# Patient Record
Sex: MALE | Race: WHITE | NOT HISPANIC OR LATINO | Employment: OTHER | URBAN - NONMETROPOLITAN AREA
[De-identification: names, ages, dates, MRNs, and addresses within clinical notes are randomized per-mention and may not be internally consistent; named-entity substitution may affect disease eponyms.]

---

## 2023-03-22 LAB
ALANINE AMINOTRANSFERASE (SGPT) (U/L) IN SER/PLAS: NORMAL
ALBUMIN (G/DL) IN SER/PLAS: NORMAL
ALKALINE PHOSPHATASE (U/L) IN SER/PLAS: NORMAL
ANION GAP IN SER/PLAS: NORMAL
ASPARTATE AMINOTRANSFERASE (SGOT) (U/L) IN SER/PLAS: NORMAL
BASOPHILS (10*3/UL) IN BLOOD BY AUTOMATED COUNT: NORMAL
BASOPHILS/100 LEUKOCYTES IN BLOOD BY AUTOMATED COUNT: NORMAL
BILIRUBIN TOTAL (MG/DL) IN SER/PLAS: NORMAL
CALCIUM (MG/DL) IN SER/PLAS: NORMAL
CARBON DIOXIDE, TOTAL (MMOL/L) IN SER/PLAS: NORMAL
CHLORIDE (MMOL/L) IN SER/PLAS: NORMAL
CHOLESTEROL (MG/DL) IN SER/PLAS: NORMAL
CHOLESTEROL IN HDL (MG/DL) IN SER/PLAS: NORMAL
CHOLESTEROL/HDL RATIO: NORMAL
CREATININE (MG/DL) IN SER/PLAS: NORMAL
EOSINOPHILS (10*3/UL) IN BLOOD BY AUTOMATED COUNT: NORMAL
EOSINOPHILS/100 LEUKOCYTES IN BLOOD BY AUTOMATED COUNT: NORMAL
ERYTHROCYTE DISTRIBUTION WIDTH (RATIO) BY AUTOMATED COUNT: NORMAL
ERYTHROCYTE MEAN CORPUSCULAR HEMOGLOBIN CONCENTRATION (G/DL) BY AUTOMATED: NORMAL
ERYTHROCYTE MEAN CORPUSCULAR VOLUME (FL) BY AUTOMATED COUNT: NORMAL
ERYTHROCYTES (10*6/UL) IN BLOOD BY AUTOMATED COUNT: NORMAL
GFR FEMALE: NORMAL
GFR MALE: NORMAL
GLUCOSE (MG/DL) IN SER/PLAS: NORMAL
HEMATOCRIT (%) IN BLOOD BY AUTOMATED COUNT: NORMAL
HEMOGLOBIN (G/DL) IN BLOOD: NORMAL
IMMATURE GRANULOCYTES/100 LEUKOCYTES IN BLOOD BY AUTOMATED COUNT: NORMAL
LDL: NORMAL
LEUKOCYTES (10*3/UL) IN BLOOD BY AUTOMATED COUNT: NORMAL
LYMPHOCYTES (10*3/UL) IN BLOOD BY AUTOMATED COUNT: NORMAL
LYMPHOCYTES/100 LEUKOCYTES IN BLOOD BY AUTOMATED COUNT: NORMAL
MANUAL DIFFERENTIAL Y/N: NORMAL
MONOCYTES (10*3/UL) IN BLOOD BY AUTOMATED COUNT: NORMAL
MONOCYTES/100 LEUKOCYTES IN BLOOD BY AUTOMATED COUNT: NORMAL
NEUTROPHILS (10*3/UL) IN BLOOD BY AUTOMATED COUNT: NORMAL
NEUTROPHILS/100 LEUKOCYTES IN BLOOD BY AUTOMATED COUNT: NORMAL
NON HDL CHOLESTEROL: NORMAL
NRBC (PER 100 WBCS) BY AUTOMATED COUNT: NORMAL
PLATELETS (10*3/UL) IN BLOOD AUTOMATED COUNT: NORMAL
POTASSIUM (MMOL/L) IN SER/PLAS: NORMAL
PROTEIN TOTAL: NORMAL
SODIUM (MMOL/L) IN SER/PLAS: NORMAL
TRIGLYCERIDE (MG/DL) IN SER/PLAS: NORMAL
UREA NITROGEN (MG/DL) IN SER/PLAS: NORMAL
VLDL: NORMAL

## 2023-03-23 LAB — PROSTATE SPECIFIC AG (NG/ML) IN SER/PLAS: <0.1 NG/ML (ref 0–4)

## 2023-06-27 PROBLEM — F17.210 CIGARETTE SMOKER: Status: ACTIVE | Noted: 2023-06-27

## 2023-06-27 PROBLEM — T14.8XXA BRUISING: Status: RESOLVED | Noted: 2023-06-27 | Resolved: 2023-06-27

## 2023-06-27 PROBLEM — R32 URINARY INCONTINENCE: Status: ACTIVE | Noted: 2023-06-27

## 2023-06-27 PROBLEM — J06.9 URI (UPPER RESPIRATORY INFECTION): Status: RESOLVED | Noted: 2023-06-27 | Resolved: 2023-06-27

## 2023-06-27 PROBLEM — L20.9 ATOPIC DERMATITIS: Status: ACTIVE | Noted: 2023-06-27

## 2023-06-27 PROBLEM — N39.0 URINARY TRACT INFECTION: Status: ACTIVE | Noted: 2023-06-27

## 2023-06-27 PROBLEM — C61 PROSTATE CANCER (MULTI): Status: ACTIVE | Noted: 2023-06-27

## 2023-06-27 RX ORDER — TADALAFIL 5 MG/1
1 TABLET ORAL DAILY
COMMUNITY
Start: 2022-01-31

## 2023-06-27 RX ORDER — BUPROPION HYDROCHLORIDE 300 MG/1
1 TABLET ORAL DAILY
COMMUNITY
Start: 2022-01-31 | End: 2023-12-14 | Stop reason: ALTCHOICE

## 2023-06-27 RX ORDER — OMEPRAZOLE 20 MG/1
TABLET, ORALLY DISINTEGRATING, DELAYED RELEASE ORAL
COMMUNITY
Start: 2022-01-31 | End: 2023-12-14 | Stop reason: SDUPTHER

## 2023-06-27 RX ORDER — BUPROPION HYDROCHLORIDE 150 MG/1
1 TABLET ORAL DAILY
COMMUNITY
Start: 2022-11-27 | End: 2023-06-30 | Stop reason: SDUPTHER

## 2023-06-27 NOTE — PROGRESS NOTES
Subjective   Reason for Visit: Alfa Cast is an 63 y.o. male here for a Medicare Wellness visit.     HPI  Prostate cancer: He is going to [facility] for pelvic floor PT. s/p robotic prostatectomy with Dr. Nel Hawthorne in March 2021. He has a followup with the surgeon's team at [facility] next month. Recently started on the cialis. He went to pelvic floor PT x2 for 4 or 5 months apiece and he only had slight temporary improvement. He states that his issue is that there is no sensation beforehand to warn him that he needs to urinate.     Getting more active. He was on wellbutrin and it was doubled up. He gave up smoking for lent!!!      GERD: Controlled on omeprazole.      Tinnitus: He has a history of tinnitus, not using hearing aids.      Dermatitis: He has been having skin dryness and splitting. He uses a working hands otx ointment. He is also having an unusual problem with bruising since he got his flut shot this year. He is experiencing some dehydration because of his bladder symptoms.     Cigarette smoker: Quit for Lent!!      He has a special needs son, age 22, who came out here from [place] to get access to a school in the area.     All other systems have been reviewed and are negative for complaint     Patient Care Team:  Veda Vital DO as PCP - General  [facility]     Review of Systems   Constitutional:  Negative for chills and fever.   HENT:  Negative for congestion, ear pain and rhinorrhea.    Eyes:  Negative for discharge and redness.   Respiratory:  Negative for cough, shortness of breath and wheezing.    Cardiovascular:  Negative for chest pain and leg swelling.   Gastrointestinal:  Negative for abdominal pain, constipation, diarrhea, nausea and vomiting.   Genitourinary:  Negative for difficulty urinating, frequency and urgency.   Musculoskeletal:  Negative for gait problem.   Skin:  Negative for rash and wound.   Neurological:  Negative for dizziness,  "weakness and headaches.   Psychiatric/Behavioral:  Negative for confusion. The patient is not nervous/anxious.        Objective   Vitals:  /76 (BP Location: Right arm, Patient Position: Sitting, BP Cuff Size: Large adult)   Pulse 60   Ht 1.753 m (5' 9\")   Wt 97 kg (213 lb 12.8 oz)   SpO2 97%   BMI 31.57 kg/m²       Physical Exam  Vitals reviewed.   Constitutional:       Appearance: Normal appearance.   HENT:      Head: Normocephalic.      Right Ear: Tympanic membrane, ear canal and external ear normal.      Left Ear: Tympanic membrane, ear canal and external ear normal.      Nose: No rhinorrhea.      Mouth/Throat:      Mouth: Mucous membranes are moist.      Pharynx: Oropharynx is clear.   Eyes:      Pupils: Pupils are equal, round, and reactive to light.   Cardiovascular:      Rate and Rhythm: Normal rate and regular rhythm.      Pulses: Normal pulses.   Pulmonary:      Effort: Pulmonary effort is normal.      Breath sounds: Normal breath sounds.   Abdominal:      General: Abdomen is flat. Bowel sounds are normal.      Palpations: Abdomen is soft.   Musculoskeletal:         General: No tenderness. Normal range of motion.      Right lower leg: No edema.      Left lower leg: No edema.   Lymphadenopathy:      Cervical: No cervical adenopathy.   Skin:     General: Skin is warm and dry.      Findings: No rash.   Neurological:      Mental Status: He is alert and oriented to person, place, and time.   Psychiatric:         Mood and Affect: Mood normal.         Behavior: Behavior normal.       Assessment/Plan   Problem List Items Addressed This Visit    None       #Prostate cancer  #Urinary incontinence  #ED  s/p radical prostatectomy  he has been to pelvic floor PT without much improvement  Monitoring PSA with [facility]      #Tinnitus  discussed supportive care     #Easy bruising  check labs  He wants to hold off on derm eval for now     #Cigarette smoker  wellbutrin is helpful  Cigar once per day   Quit for a long " time  Ppd >20 years   Would like CT lung cancer screening      #GERD  Controlled on otc omeprazole     HCM:  UTD for COVID, PPSV23, and flu shots  last C-scope was 2021, next due 2026

## 2023-06-30 ENCOUNTER — OFFICE VISIT (OUTPATIENT)
Dept: PRIMARY CARE | Facility: CLINIC | Age: 63
End: 2023-06-30
Payer: MEDICARE

## 2023-06-30 VITALS
OXYGEN SATURATION: 97 % | SYSTOLIC BLOOD PRESSURE: 144 MMHG | HEART RATE: 60 BPM | DIASTOLIC BLOOD PRESSURE: 76 MMHG | BODY MASS INDEX: 31.67 KG/M2 | HEIGHT: 69 IN | WEIGHT: 213.8 LBS

## 2023-06-30 DIAGNOSIS — C61 PROSTATE CANCER (MULTI): ICD-10-CM

## 2023-06-30 DIAGNOSIS — Z00.00 ROUTINE GENERAL MEDICAL EXAMINATION AT HEALTH CARE FACILITY: Primary | ICD-10-CM

## 2023-06-30 DIAGNOSIS — N39.3 STRESS INCONTINENCE OF URINE: ICD-10-CM

## 2023-06-30 DIAGNOSIS — Z13.6 SCREENING FOR AAA (ABDOMINAL AORTIC ANEURYSM): ICD-10-CM

## 2023-06-30 DIAGNOSIS — Z13.1 DIABETES MELLITUS SCREENING: ICD-10-CM

## 2023-06-30 DIAGNOSIS — Z13.6 SCREENING FOR CARDIOVASCULAR CONDITION: ICD-10-CM

## 2023-06-30 DIAGNOSIS — Z87.891 PERSONAL HISTORY OF TOBACCO USE, PRESENTING HAZARDS TO HEALTH: ICD-10-CM

## 2023-06-30 PROCEDURE — G0402 INITIAL PREVENTIVE EXAM: HCPCS | Performed by: REGISTERED NURSE

## 2023-06-30 PROCEDURE — 1159F MED LIST DOCD IN RCRD: CPT | Performed by: REGISTERED NURSE

## 2023-06-30 PROCEDURE — 1036F TOBACCO NON-USER: CPT | Performed by: REGISTERED NURSE

## 2023-06-30 PROCEDURE — 1160F RVW MEDS BY RX/DR IN RCRD: CPT | Performed by: REGISTERED NURSE

## 2023-06-30 PROCEDURE — 99214 OFFICE O/P EST MOD 30 MIN: CPT | Performed by: REGISTERED NURSE

## 2023-06-30 PROCEDURE — 1170F FXNL STATUS ASSESSED: CPT | Performed by: REGISTERED NURSE

## 2023-06-30 ASSESSMENT — ENCOUNTER SYMPTOMS
SHORTNESS OF BREATH: 0
EYE REDNESS: 0
NERVOUS/ANXIOUS: 0
CONSTIPATION: 0
HEADACHES: 0
WHEEZING: 0
CHILLS: 0
DIARRHEA: 0
COUGH: 0
RHINORRHEA: 0
LOSS OF SENSATION IN FEET: 0
VOMITING: 0
EYE DISCHARGE: 0
DIFFICULTY URINATING: 0
DEPRESSION: 0
FREQUENCY: 0
ABDOMINAL PAIN: 0
OCCASIONAL FEELINGS OF UNSTEADINESS: 0
CONFUSION: 0
WEAKNESS: 0
WOUND: 0
DIZZINESS: 0
NAUSEA: 0
FEVER: 0

## 2023-06-30 ASSESSMENT — ACTIVITIES OF DAILY LIVING (ADL)
MANAGING_FINANCES: INDEPENDENT
DOING_HOUSEWORK: INDEPENDENT
GROCERY_SHOPPING: INDEPENDENT
TAKING_MEDICATION: INDEPENDENT
BATHING: INDEPENDENT
DRESSING: INDEPENDENT

## 2023-06-30 ASSESSMENT — PATIENT HEALTH QUESTIONNAIRE - PHQ9
SUM OF ALL RESPONSES TO PHQ9 QUESTIONS 1 AND 2: 1
10. IF YOU CHECKED OFF ANY PROBLEMS, HOW DIFFICULT HAVE THESE PROBLEMS MADE IT FOR YOU TO DO YOUR WORK, TAKE CARE OF THINGS AT HOME, OR GET ALONG WITH OTHER PEOPLE: NOT DIFFICULT AT ALL
2. FEELING DOWN, DEPRESSED OR HOPELESS: NOT AT ALL
1. LITTLE INTEREST OR PLEASURE IN DOING THINGS: SEVERAL DAYS

## 2023-08-01 ENCOUNTER — LAB (OUTPATIENT)
Dept: LAB | Facility: LAB | Age: 63
End: 2023-08-01
Payer: MEDICARE

## 2023-08-01 DIAGNOSIS — Z13.1 DIABETES MELLITUS SCREENING: ICD-10-CM

## 2023-08-01 DIAGNOSIS — Z13.6 SCREENING FOR CARDIOVASCULAR CONDITION: ICD-10-CM

## 2023-08-01 LAB
ANION GAP IN SER/PLAS: 9 MMOL/L (ref 10–20)
CALCIUM (MG/DL) IN SER/PLAS: 9 MG/DL (ref 8.6–10.3)
CARBON DIOXIDE, TOTAL (MMOL/L) IN SER/PLAS: 27 MMOL/L (ref 21–32)
CHLORIDE (MMOL/L) IN SER/PLAS: 104 MMOL/L (ref 98–107)
CHOLESTEROL (MG/DL) IN SER/PLAS: 215 MG/DL (ref 0–199)
CHOLESTEROL IN HDL (MG/DL) IN SER/PLAS: 61.6 MG/DL
CHOLESTEROL/HDL RATIO: 3.5
CREATININE (MG/DL) IN SER/PLAS: 0.68 MG/DL (ref 0.5–1.3)
GFR MALE: >90 ML/MIN/1.73M2
GLUCOSE (MG/DL) IN SER/PLAS: 100 MG/DL (ref 74–99)
LDL: 130 MG/DL (ref 0–99)
POTASSIUM (MMOL/L) IN SER/PLAS: 4.2 MMOL/L (ref 3.5–5.3)
SODIUM (MMOL/L) IN SER/PLAS: 136 MMOL/L (ref 136–145)
TRIGLYCERIDE (MG/DL) IN SER/PLAS: 118 MG/DL (ref 0–149)
UREA NITROGEN (MG/DL) IN SER/PLAS: 17 MG/DL (ref 6–23)
VLDL: 24 MG/DL (ref 0–40)

## 2023-08-01 PROCEDURE — 80061 LIPID PANEL: CPT

## 2023-08-01 PROCEDURE — 36415 COLL VENOUS BLD VENIPUNCTURE: CPT

## 2023-08-01 PROCEDURE — 80048 BASIC METABOLIC PNL TOTAL CA: CPT

## 2023-09-23 LAB — PROSTATE SPECIFIC AG (NG/ML) IN SER/PLAS: <0.1 NG/ML (ref 0–4)

## 2023-11-24 ENCOUNTER — APPOINTMENT (OUTPATIENT)
Dept: RADIOLOGY | Facility: HOSPITAL | Age: 63
End: 2023-11-24
Payer: MEDICARE

## 2023-11-24 ENCOUNTER — HOSPITAL ENCOUNTER (EMERGENCY)
Facility: HOSPITAL | Age: 63
Discharge: HOME | End: 2023-11-24
Attending: EMERGENCY MEDICINE
Payer: MEDICARE

## 2023-11-24 VITALS
TEMPERATURE: 98.6 F | DIASTOLIC BLOOD PRESSURE: 108 MMHG | OXYGEN SATURATION: 97 % | BODY MASS INDEX: 30.06 KG/M2 | WEIGHT: 210 LBS | SYSTOLIC BLOOD PRESSURE: 154 MMHG | HEART RATE: 78 BPM | RESPIRATION RATE: 20 BRPM | HEIGHT: 70 IN

## 2023-11-24 DIAGNOSIS — S32.030A CLOSED COMPRESSION FRACTURE OF L3 LUMBAR VERTEBRA, INITIAL ENCOUNTER (MULTI): Primary | ICD-10-CM

## 2023-11-24 DIAGNOSIS — M16.12 PRIMARY OSTEOARTHRITIS OF LEFT HIP: ICD-10-CM

## 2023-11-24 LAB
ANION GAP SERPL CALC-SCNC: 10 MMOL/L (ref 10–20)
APPEARANCE UR: CLEAR
BASOPHILS # BLD AUTO: 0.12 X10*3/UL (ref 0–0.1)
BASOPHILS NFR BLD AUTO: 2.7 %
BILIRUB UR STRIP.AUTO-MCNC: NEGATIVE MG/DL
BUN SERPL-MCNC: 14 MG/DL (ref 6–23)
CALCIUM SERPL-MCNC: 8.7 MG/DL (ref 8.6–10.3)
CHLORIDE SERPL-SCNC: 108 MMOL/L (ref 98–107)
CO2 SERPL-SCNC: 28 MMOL/L (ref 21–32)
COLOR UR: YELLOW
CREAT SERPL-MCNC: 0.63 MG/DL (ref 0.5–1.3)
EOSINOPHIL # BLD AUTO: 0.29 X10*3/UL (ref 0–0.7)
EOSINOPHIL NFR BLD AUTO: 6.6 %
ERYTHROCYTE [DISTWIDTH] IN BLOOD BY AUTOMATED COUNT: 13.7 % (ref 11.5–14.5)
GFR SERPL CREATININE-BSD FRML MDRD: >90 ML/MIN/1.73M*2
GLUCOSE SERPL-MCNC: 88 MG/DL (ref 74–99)
GLUCOSE UR STRIP.AUTO-MCNC: NEGATIVE MG/DL
HCT VFR BLD AUTO: 39.4 % (ref 41–52)
HGB BLD-MCNC: 13.3 G/DL (ref 13.5–17.5)
IMM GRANULOCYTES # BLD AUTO: 0.01 X10*3/UL (ref 0–0.7)
IMM GRANULOCYTES NFR BLD AUTO: 0.2 % (ref 0–0.9)
KETONES UR STRIP.AUTO-MCNC: NEGATIVE MG/DL
LEUKOCYTE ESTERASE UR QL STRIP.AUTO: NEGATIVE
LYMPHOCYTES # BLD AUTO: 1.19 X10*3/UL (ref 1.2–4.8)
LYMPHOCYTES NFR BLD AUTO: 27 %
MCH RBC QN AUTO: 30.9 PG (ref 26–34)
MCHC RBC AUTO-ENTMCNC: 33.8 G/DL (ref 32–36)
MCV RBC AUTO: 91 FL (ref 80–100)
MONOCYTES # BLD AUTO: 0.67 X10*3/UL (ref 0.1–1)
MONOCYTES NFR BLD AUTO: 15.2 %
NEUTROPHILS # BLD AUTO: 2.12 X10*3/UL (ref 1.2–7.7)
NEUTROPHILS NFR BLD AUTO: 48.3 %
NITRITE UR QL STRIP.AUTO: NEGATIVE
NRBC BLD-RTO: 0 /100 WBCS (ref 0–0)
PH UR STRIP.AUTO: 7 [PH]
PLATELET # BLD AUTO: 263 X10*3/UL (ref 150–450)
POTASSIUM SERPL-SCNC: 4.2 MMOL/L (ref 3.5–5.3)
PROT UR STRIP.AUTO-MCNC: NEGATIVE MG/DL
RBC # BLD AUTO: 4.31 X10*6/UL (ref 4.5–5.9)
RBC # UR STRIP.AUTO: NEGATIVE /UL
SODIUM SERPL-SCNC: 142 MMOL/L (ref 136–145)
SP GR UR STRIP.AUTO: 1.02
UROBILINOGEN UR STRIP.AUTO-MCNC: <2 MG/DL
WBC # BLD AUTO: 4.4 X10*3/UL (ref 4.4–11.3)

## 2023-11-24 PROCEDURE — 81003 URINALYSIS AUTO W/O SCOPE: CPT | Performed by: EMERGENCY MEDICINE

## 2023-11-24 PROCEDURE — 96372 THER/PROPH/DIAG INJ SC/IM: CPT

## 2023-11-24 PROCEDURE — 99284 EMERGENCY DEPT VISIT MOD MDM: CPT | Mod: 25

## 2023-11-24 PROCEDURE — 99285 EMERGENCY DEPT VISIT HI MDM: CPT | Mod: 25 | Performed by: EMERGENCY MEDICINE

## 2023-11-24 PROCEDURE — 94760 N-INVAS EAR/PLS OXIMETRY 1: CPT

## 2023-11-24 PROCEDURE — 85025 COMPLETE CBC W/AUTO DIFF WBC: CPT | Performed by: EMERGENCY MEDICINE

## 2023-11-24 PROCEDURE — 72131 CT LUMBAR SPINE W/O DYE: CPT

## 2023-11-24 PROCEDURE — 36415 COLL VENOUS BLD VENIPUNCTURE: CPT | Performed by: EMERGENCY MEDICINE

## 2023-11-24 PROCEDURE — 2500000004 HC RX 250 GENERAL PHARMACY W/ HCPCS (ALT 636 FOR OP/ED): Performed by: EMERGENCY MEDICINE

## 2023-11-24 PROCEDURE — 73502 X-RAY EXAM HIP UNI 2-3 VIEWS: CPT | Mod: RT,FY

## 2023-11-24 PROCEDURE — 72131 CT LUMBAR SPINE W/O DYE: CPT | Performed by: RADIOLOGY

## 2023-11-24 PROCEDURE — 2500000001 HC RX 250 WO HCPCS SELF ADMINISTERED DRUGS (ALT 637 FOR MEDICARE OP): Performed by: EMERGENCY MEDICINE

## 2023-11-24 PROCEDURE — 80048 BASIC METABOLIC PNL TOTAL CA: CPT | Performed by: EMERGENCY MEDICINE

## 2023-11-24 PROCEDURE — 73502 X-RAY EXAM HIP UNI 2-3 VIEWS: CPT | Mod: RIGHT SIDE | Performed by: RADIOLOGY

## 2023-11-24 RX ORDER — ORPHENADRINE CITRATE 30 MG/ML
60 INJECTION INTRAMUSCULAR; INTRAVENOUS ONCE
Status: COMPLETED | OUTPATIENT
Start: 2023-11-24 | End: 2023-11-24

## 2023-11-24 RX ORDER — HYDROCODONE BITARTRATE AND ACETAMINOPHEN 5; 325 MG/1; MG/1
1 TABLET ORAL EVERY 6 HOURS PRN
Qty: 12 TABLET | Refills: 0 | Status: SHIPPED | OUTPATIENT
Start: 2023-11-24 | End: 2023-11-27

## 2023-11-24 RX ORDER — IBUPROFEN 600 MG/1
600 TABLET ORAL EVERY 8 HOURS PRN
Qty: 30 TABLET | Refills: 0 | Status: SHIPPED | OUTPATIENT
Start: 2023-11-24 | End: 2024-02-28 | Stop reason: WASHOUT

## 2023-11-24 RX ORDER — HYDROCODONE BITARTRATE AND ACETAMINOPHEN 5; 325 MG/1; MG/1
1 TABLET ORAL ONCE
Status: COMPLETED | OUTPATIENT
Start: 2023-11-24 | End: 2023-11-24

## 2023-11-24 RX ORDER — KETOROLAC TROMETHAMINE 15 MG/ML
15 INJECTION, SOLUTION INTRAMUSCULAR; INTRAVENOUS ONCE
Status: COMPLETED | OUTPATIENT
Start: 2023-11-24 | End: 2023-11-24

## 2023-11-24 RX ADMIN — HYDROCODONE BITARTRATE AND ACETAMINOPHEN 1 TABLET: 5; 325 TABLET ORAL at 16:13

## 2023-11-24 RX ADMIN — ORPHENADRINE CITRATE 60 MG: 60 INJECTION INTRAMUSCULAR; INTRAVENOUS at 15:08

## 2023-11-24 RX ADMIN — KETOROLAC TROMETHAMINE 15 MG: 15 INJECTION INTRAMUSCULAR; INTRAVENOUS at 14:50

## 2023-11-24 ASSESSMENT — PAIN DESCRIPTION - ORIENTATION: ORIENTATION: LOWER;LEFT

## 2023-11-24 ASSESSMENT — PAIN SCALES - GENERAL: PAINLEVEL_OUTOF10: 8

## 2023-11-24 ASSESSMENT — PAIN DESCRIPTION - LOCATION: LOCATION: BACK

## 2023-11-24 ASSESSMENT — COLUMBIA-SUICIDE SEVERITY RATING SCALE - C-SSRS
2. HAVE YOU ACTUALLY HAD ANY THOUGHTS OF KILLING YOURSELF?: NO
1. IN THE PAST MONTH, HAVE YOU WISHED YOU WERE DEAD OR WISHED YOU COULD GO TO SLEEP AND NOT WAKE UP?: NO
6. HAVE YOU EVER DONE ANYTHING, STARTED TO DO ANYTHING, OR PREPARED TO DO ANYTHING TO END YOUR LIFE?: NO

## 2023-11-24 ASSESSMENT — PAIN - FUNCTIONAL ASSESSMENT: PAIN_FUNCTIONAL_ASSESSMENT: 0-10

## 2023-11-24 ASSESSMENT — PAIN DESCRIPTION - PAIN TYPE: TYPE: ACUTE PAIN

## 2023-11-24 NOTE — DISCHARGE INSTRUCTIONS
Hydrocodone and ibuprofen for pain.    Follow-up with your primary care doctor in 1 week for recheck.    Return for worsening symptoms or concerns.

## 2023-11-24 NOTE — ED PROVIDER NOTES
[facility]  ED  Provider Note  11/24/2023  1:19 PM  AC04/AC04        History of Present Illness:   Alfa Cast is a 63 y.o. male presenting to the ED for low back pain and left hip pain, beginning a couple days ago.  The complaint has been persistent, severe in severity, and worsened by movement.  Patient is a 63-year-old male who had increasing back pain for the past 2 days.  He denies any trauma or overuse.  He has no urinary symptoms.  He has no history of kidney stones.  He denies any loss of bowel or bladder control.  He denies any loss of strength sensation to his legs.  He has no frontal abdominal pain.  The pain rates in his low back down to his left hip.  He has had known hip arthritis in the past.  He denies rash or history of shingles in the past.      Review of Systems:   Pertinent positives and review of systems as noted above.  Remaining 10 review of systems is negative or noncontributory to today's episode of care.  Review of Systems       --------------------------------------------- PAST HISTORY ---------------------------------------------  Past Medical History:  has a past medical history of URI (upper respiratory infection) (06/27/2023).    Past Surgical History:  has no past surgical history on file.    Social History:  reports that he has quit smoking. His smoking use included cigarettes. He has been exposed to tobacco smoke. He has never used smokeless tobacco. He reports that he does not currently use alcohol. He reports that he does not use drugs.    Family History: family history is not on file. Unless otherwise noted, family history is non contributory    Patient's Medications   New Prescriptions    No medications on file   Previous Medications    BUPROPION XL (WELLBUTRIN XL) 300 MG 24 HR TABLET    Take 1 tablet (300 mg) by mouth once daily.    OMEPRAZOLE 20 MG TABLET,DISINTEGRAT, DELAY REL    Take by mouth.    TADALAFIL (CIALIS) 5 MG TABLET    Take 1 tablet (5 mg) by  mouth once daily.   Modified Medications    No medications on file   Discontinued Medications    No medications on file      The patient’s home medications have been reviewed.    Allergies: Penicillins    -------------------------------------------------- RESULTS -------------------------------------------------  All laboratory and radiology results have been personally reviewed by myself   LABS:  Labs Reviewed   CBC WITH AUTO DIFFERENTIAL - Abnormal       Result Value    WBC 4.4      nRBC 0.0      RBC 4.31 (*)     Hemoglobin 13.3 (*)     Hematocrit 39.4 (*)     MCV 91      MCH 30.9      MCHC 33.8      RDW 13.7      Platelets 263      Neutrophils % 48.3      Immature Granulocytes %, Automated 0.2      Lymphocytes % 27.0      Monocytes % 15.2      Eosinophils % 6.6      Basophils % 2.7      Neutrophils Absolute 2.12      Immature Granulocytes Absolute, Automated 0.01      Lymphocytes Absolute 1.19 (*)     Monocytes Absolute 0.67      Eosinophils Absolute 0.29      Basophils Absolute 0.12 (*)    BASIC METABOLIC PANEL - Abnormal    Glucose 88      Sodium 142      Potassium 4.2      Chloride 108 (*)     Bicarbonate 28      Anion Gap 10      Urea Nitrogen 14      Creatinine 0.63      eGFR >90      Calcium 8.7     URINALYSIS WITH REFLEX MICROSCOPIC - Normal    Color, Urine Yellow      Appearance, Urine Clear      Specific Gravity, Urine 1.018      pH, Urine 7.0      Protein, Urine NEGATIVE      Glucose, Urine NEGATIVE      Blood, Urine NEGATIVE      Ketones, Urine NEGATIVE      Bilirubin, Urine NEGATIVE      Urobilinogen, Urine <2.0      Nitrite, Urine NEGATIVE      Leukocyte Esterase, Urine NEGATIVE           RADIOLOGY:  Interpreted by Radiologist.  XR hip right 2 or 3 views   Final Result   1.  No fracture or dislocation.   2. Degenerative changes, as described above.        MACRO:   None.        Signed by: Gustabo Roque 11/24/2023 3:45 PM   Dictation workstation:   [id number]      CT lumbar spine wo IV contrast  "  Final Result   1. L3 compression fracture, of uncertain acuity.   2. Degenerative changes, as described above.        MACRO:   None.        Signed by: Gustabo Roque 11/24/2023 2:37 PM   Dictation workstation:   [id number]          No results found for this or any previous visit (from the past 4464 hour(s)).  ------------------------- NURSING NOTES AND VITALS REVIEWED ---------------------------   The nursing notes within the ED encounter and vital signs as below have been reviewed.   BP (!) 154/108   Pulse 78   Temp 37 °C (98.6 °F) (Skin)   Resp 20   Ht 1.778 m (5' 10\")   Wt 95.3 kg (210 lb)   SpO2 97%   BMI 30.13 kg/m²   Oxygen Saturation Interpretation: Normal      ---------------------------------------------------PHYSICAL EXAM--------------------------------------  Physical Exam   Constitutional/General: Alert and oriented x3, well appearing, non toxic in NAD  Head: Normocephalic and atraumatic  Eyes: PERRL, EOMI, conjunctiva normal, sclera non icteric  Mouth: Oropharynx clear, handling secretions, no trismus, no asymmetry of the posterior oropharynx or uvular edema  Neck: Supple, full ROM, non tender to palpation in the midline, no stridor, no crepitus, no meningeal signs  Respiratory: Lungs clear to auscultation bilaterally, no wheezes, rales, or rhonchi. Not in respiratory distress  Cardiovascular:  Regular rate. Regular rhythm. No murmurs, gallops, or rubs. 2+ distal pulses  Chest: No chest wall tenderness  GI:  Abdomen Soft, Non tender, Non distended.  +BS. No organomegaly, no palpable masses,  No rebound, guarding, or rigidity.   Musculoskeletal: Moves all extremities x 4. Warm and well perfused, no clubbing, cyanosis, or edema. Capillary refill <3 seconds.  There is lower lumbar tense palpation centrally.  Is no static notch tenderness.  There is  pain with movement of the left hip joint.  There is no rash of the skin.  Integument: skin warm and dry. No rashes.   Lymphatic: no lymphadenopathy " noted  Neurologic: GCS 15, no focal deficits, symmetric strength 5/5 in the upper and lower extremities bilaterally  Psychiatric: Normal Affect    Procedures    ------------------------------ ED COURSE/MEDICAL DECISION MAKING----------------------    Medical Decision Making:   Patient has low lumbar back pain and left hip pain for the past several days.  Diagnoses as of 11/24/23 1602   Closed compression fracture of L3 lumbar vertebra, initial encounter (CMS/[facility])   Primary osteoarthritis of left hip    There has been no fever chills or signs of systemic infection.  He denies any trauma or overuse.  He does have a history of hip problems in the past running marathons as a younger man.  He has no history of back injury recently.  His CAT scan shows an L3 compression fracture of uncertain age and degenerative changes of the left hip are noted on x-ray.  He was discharged home with ibuprofen and hydrocodone for pain.  He is to follow-up with his primary care doctor on Monday for additional care and possible referral to orthopedics for pain management.  Counseling:   The emergency provider has spoken with the patient and discussed today’s results, in addition to providing specific details for the plan of care and counseling regarding the diagnosis and prognosis.  Questions are answered at this time and they are agreeable with the plan.      --------------------------------- IMPRESSION AND DISPOSITION ---------------------------------        IMPRESSION  1. Closed compression fracture of L3 lumbar vertebra, initial encounter (CMS/[facility])    2. Primary osteoarthritis of left hip        DISPOSITION  Disposition: Discharge to home  Patient condition is good      Billing Provider Critical Care Time: 0 minutes     Eulogio Moore MD  11/24/23 0811

## 2023-11-27 ENCOUNTER — TELEPHONE (OUTPATIENT)
Dept: PRIMARY CARE | Facility: CLINIC | Age: 63
End: 2023-11-27
Payer: MEDICARE

## 2023-11-27 DIAGNOSIS — S32.030A COMPRESSION FRACTURE OF L3 VERTEBRA, INITIAL ENCOUNTER (MULTI): ICD-10-CM

## 2023-11-27 NOTE — TELEPHONE ENCOUNTER
I spoke with Alfa, he would like to see Dr. Marshall. Referral placed and information given. Alfa was given 12 [place] from ER. He has 3 left and they barely touch the pain. He is wondering if you would send him something else for pain?

## 2023-11-27 NOTE — TELEPHONE ENCOUNTER
Alfa called to follow up from an ER visit on Friday 11/24. Discharge dx was Closed compression fracture of L3 lumbar vertebra. The ER did not set him up with an appointment with an ortho. Who should he see?

## 2023-11-28 ENCOUNTER — TELEMEDICINE (OUTPATIENT)
Dept: PRIMARY CARE | Facility: CLINIC | Age: 63
End: 2023-11-28
Payer: MEDICARE

## 2023-11-28 DIAGNOSIS — S32.030A CLOSED COMPRESSION FRACTURE OF L3 LUMBAR VERTEBRA, INITIAL ENCOUNTER (MULTI): Primary | ICD-10-CM

## 2023-11-28 PROCEDURE — 99442 PR PHYS/QHP TELEPHONE EVALUATION 11-20 MIN: CPT | Performed by: FAMILY MEDICINE

## 2023-11-28 RX ORDER — CYCLOBENZAPRINE HCL 10 MG
10 TABLET ORAL 3 TIMES DAILY PRN
Qty: 21 TABLET | Refills: 0 | Status: SHIPPED | OUTPATIENT
Start: 2023-11-28 | End: 2024-02-28 | Stop reason: WASHOUT

## 2023-11-28 RX ORDER — OXYCODONE HYDROCHLORIDE 5 MG/1
5 TABLET ORAL EVERY 6 HOURS PRN
Qty: 21 TABLET | Refills: 0 | Status: SHIPPED | OUTPATIENT
Start: 2023-11-28 | End: 2023-12-05 | Stop reason: SDUPTHER

## 2023-11-28 ASSESSMENT — ENCOUNTER SYMPTOMS
LEG PAIN: 1
BOWEL INCONTINENCE: 0
TINGLING: 0
HEADACHES: 0
PERIANAL NUMBNESS: 0
WEIGHT LOSS: 0
PARESIS: 0
NUMBNESS: 1
PARESTHESIAS: 0
FEVER: 0
DYSURIA: 0
ABDOMINAL PAIN: 0
BACK PAIN: 1
WEAKNESS: 1

## 2023-11-28 NOTE — PROGRESS NOTES
Afla Cast is a 63 y.o. male who presents for back pain:    Back pain: Answers submitted by the patient for this visit:  Back Pain Questionnaire (Submitted on 11/28/2023)  Chief Complaint: Back pain  Chronicity: new  Onset: in the past 7 days  Frequency: constantly  Progression since onset: gradually worsening  Pain location: gluteal, sacro-iliac  Pain quality: aching, stabbing  Radiates to: left thigh  Pain - numeric: 8/10  Pain is: the same all the time  Aggravated by: lying down, standing, twisting  abdominal pain: No  bowel incontinence: No  chest pain: No  dysuria: No  fever: No  headaches: No  leg pain: Yes  numbness: Yes  paresis: No  paresthesias: No  pelvic pain: Yes  perianal numbness: No  tingling: No  weakness: Yes  weight loss: No  Risk factors: history of cancer, lack of exercise, recent trauma    A couple of weeks ago he was doing yard work and had a couple days of pain that resolved. He woke up one day before thanksgiving and the pain was significantly worse and he couldn't even stand due to pain. Couldn't sleep or stand. Excruciating pain through Thanksgiving. Went to the ER and had CT lumbar spine that showed lumbar compression fracture. He was given rx for ibuprofen and hydrocodone-acetaminophen. Tried the pain meds with almost no relief. He took 2 at once and slept a bit. Took all his meds over the weekend. He does have an appt with ortho tomorrow with Honey Gonzalez tomorrow in [place].      All other systems have been reviewed and are negative for complaint     Objective   There were no vitals taken for this visit.      Assessment/Plan     #Lumbar spine compression fracture  Has appt with ortho spine  Rx sent cyclobenzaprine  Rx sent oxycodone

## 2023-11-29 ENCOUNTER — OFFICE VISIT (OUTPATIENT)
Dept: ORTHOPEDIC SURGERY | Facility: CLINIC | Age: 63
End: 2023-11-29
Payer: MEDICARE

## 2023-11-29 ENCOUNTER — ANCILLARY PROCEDURE (OUTPATIENT)
Dept: RADIOLOGY | Facility: CLINIC | Age: 63
End: 2023-11-29
Payer: MEDICARE

## 2023-11-29 DIAGNOSIS — M54.50 LUMBAR PAIN: ICD-10-CM

## 2023-11-29 DIAGNOSIS — S32.030A COMPRESSION FRACTURE OF L3 VERTEBRA, INITIAL ENCOUNTER (MULTI): ICD-10-CM

## 2023-11-29 DIAGNOSIS — M54.16 LUMBAR RADICULOPATHY, ACUTE: Primary | ICD-10-CM

## 2023-11-29 PROCEDURE — 1159F MED LIST DOCD IN RCRD: CPT | Performed by: ORTHOPAEDIC SURGERY

## 2023-11-29 PROCEDURE — 1036F TOBACCO NON-USER: CPT | Performed by: ORTHOPAEDIC SURGERY

## 2023-11-29 PROCEDURE — 72110 X-RAY EXAM L-2 SPINE 4/>VWS: CPT | Mod: FY

## 2023-11-29 PROCEDURE — 1160F RVW MEDS BY RX/DR IN RCRD: CPT | Performed by: ORTHOPAEDIC SURGERY

## 2023-11-29 PROCEDURE — 1125F AMNT PAIN NOTED PAIN PRSNT: CPT | Performed by: ORTHOPAEDIC SURGERY

## 2023-11-29 PROCEDURE — 99214 OFFICE O/P EST MOD 30 MIN: CPT | Performed by: ORTHOPAEDIC SURGERY

## 2023-11-29 RX ORDER — METHYLPREDNISOLONE 4 MG/1
TABLET ORAL
Qty: 21 TABLET | Refills: 0 | Status: SHIPPED | OUTPATIENT
Start: 2023-11-29 | End: 2023-12-06

## 2023-11-29 RX ORDER — METHOCARBAMOL 500 MG/1
500 TABLET, FILM COATED ORAL 3 TIMES DAILY PRN
Qty: 90 TABLET | Refills: 0 | Status: SHIPPED | OUTPATIENT
Start: 2023-11-29 | End: 2024-01-03

## 2023-11-29 ASSESSMENT — PAIN - FUNCTIONAL ASSESSMENT: PAIN_FUNCTIONAL_ASSESSMENT: 0-10

## 2023-11-29 ASSESSMENT — PAIN SCALES - GENERAL: PAINLEVEL_OUTOF10: 9

## 2023-11-29 NOTE — PROGRESS NOTES
Chief Complaint: Low back pain and left leg pain    HPI: Alfa Cast is a 63 y.o. year old male patient with past medical history significant for prostate cancer with no known metastasis that he was aware of who underwent a prostatectomy 2-1/2 years ago at [facility] with residual urinary incontinence.  He is seen today with acute low back pain.  States that a week before Thanksgiving he was having a lot of stiffness in his lower back and then in the evening before Thanksgiving on 11/24/2023 he developed severe left flank pain which then gradually radiated into his left hip around the buttock region down to his shin and ankle all on the left side.  He also complains of dullness and diminished sensation involving his left leg.  Otherwise asymptomatic on the right side.  Denies any bowel incontinence.  He has baseline urinary incontinence since his prostate surgery.  Denies any saddle anesthesia.  Denies history of lumbar spine surgery.    He was seen in the emergency room on November 24, 2023 where a CT scan was done which was suggestive of possible L3 compression fracture of unknown chronicity.  He was originally given Norco which did not help.  His primary care physician recently prescribed him oxycodone which she has not picked up from the pharmacy yet.    No anticoagulations  Quit tobacco use about a year ago    Review of Systems    All other systems have been reviewed and are negative for complaint. All pertinent positive and negative as listed in history of present illness.    Past Medical History:   Diagnosis Date    URI (upper respiratory infection) 06/27/2023        No past surgical history on file.     Allergies   Allergen Reactions    Penicillins Unknown        Current Outpatient Medications on File Prior to Visit   Medication Sig Dispense Refill    buPROPion XL (Wellbutrin XL) 300 mg 24 hr tablet Take 1 tablet (300 mg) by mouth once daily.      cyclobenzaprine (Flexeril) 10 mg tablet Take 1 tablet  (10 mg) by mouth 3 times a day as needed for muscle spasms for up to 7 days. 21 tablet 0    ibuprofen 600 mg tablet Take 1 tablet (600 mg) by mouth every 8 hours if needed for mild pain (1 - 3) or fever (temp greater than 38.0 C). 30 tablet 0    omeprazole 20 mg tablet,disintegrat, delay rel Take by mouth.      oxyCODONE (Roxicodone) 5 mg immediate release tablet Take 1 tablet (5 mg) by mouth every 6 hours if needed for moderate pain (4 - 6) for up to 7 days. 21 tablet 0    tadalafil (Cialis) 5 mg tablet Take 1 tablet (5 mg) by mouth once daily.      [] HYDROcodone-acetaminophen (Norco) 5-325 mg tablet Take 1 tablet by mouth every 6 hours if needed for severe pain (7 - 10) for up to 3 days. 12 tablet 0     No current facility-administered medications on file prior to visit.          PE:   General: Patient appears well-nourished and well-developed in no acute distress, Alert and Oriented x3  Psych: Pleasant mood and affect  HEENT: Extraocular muscles intact, pupils equal and round. Sclerae anicteric   Cardio: extremities warm and well perfused  Resp: unlabored symmetric breathing  Skin: no open wounds or rash  Musculoskeletal/Neuro Exam: Walks leaning forward with a cane.  Mild tenderness to palpation along the left flank.  Positive straight leg raise on the left negative on the right tight hamstrings bilaterally.     Lower extremity    Motor: Right leg with 5 out of 5 motor strength with hip flexion, knee extension, ankle dorsiflexion plantarflexion and EHL against resistance.  Left leg with 5 4 out of 5 motor strength with hip flexion, knee extension limited by pain, 5 out of 5 ankle dorsiflexion plantarflexion EHL against resistance  Sensation to light touch intact along L2 to S1 distribution bilaterally but diminished on the left compared to right in all distribution          Imaging:    I reviewed x-rays obtained in clinic today AP lateral flexion-extension views.  He has mild curvature at the  thoracolumbar junction.  On lateral view he has degenerative changes with endplate sclerosis between L1-L2 L2-L3 with some compression involving the superior endplate of L3 no dynamic instability with flexion extension.    I reviewed the CT scan of the lumbar spine from November 24, 2023 which shows mild compression of the L3 superior endplate with no retropulsed bone of unknown chronicity but degenerative changes in the lumbar spine similar to the x-ray.          A/P: Alfa Cast is a 63 y.o. year old male patient with history of prostate cancer status post prostatectomy with acute lower back pain for the past week that was atraumatic possible L3 superior endplate fracture from CT scan in the emergency room.  He also is having left lumbar radiculopathy with some weakness in his hip flexion and knee extension as well as diminished sensation compared to his contralateral side.  Given his fracture on CT scan as well as his radicular leg pain and weakness and his history of prostate cancer I would like for him to get an MRI of his lumbar spine to evaluate the fracture and its chronicity as well as to evaluate potential nerve impingement and possible metastatic lesion.  In the meantime he should take the oxycodone that was prescribed to him by his PCP.  I also gave him prescription for Robaxin as well as Medrol Dosepak to help with the back pain and radicular leg pain.  I did advise him that sometimes Robaxin can cause drowsiness.  He should not take it together with oxycodone at the same time and perhaps should space the out.  Both of these medications are as needed.  He was advised to schedule appointment to see me back after the MRI is complete. After our discussion, the patient articulated understanding of the plan and felt that all questions had been answered satisfactorily. The patient was pleased with the visit and very appreciative for the care rendered.    **Please excuse any errors in grammar or translation  related to this dictation. Voice recognition software was utilized to prepare this document. **              Honey Gonzalez MD    Department of Orthopaedic Surgery  [facility]  Hosea@\[facility] [place] [facility]\"".[facility]

## 2023-12-05 ENCOUNTER — TELEPHONE (OUTPATIENT)
Dept: PRIMARY CARE | Facility: CLINIC | Age: 63
End: 2023-12-05
Payer: MEDICARE

## 2023-12-05 DIAGNOSIS — S32.030A CLOSED COMPRESSION FRACTURE OF L3 LUMBAR VERTEBRA, INITIAL ENCOUNTER (MULTI): ICD-10-CM

## 2023-12-05 RX ORDER — OXYCODONE HYDROCHLORIDE 5 MG/1
5 TABLET ORAL EVERY 6 HOURS PRN
Qty: 21 TABLET | Refills: 0 | Status: SHIPPED | OUTPATIENT
Start: 2023-12-05 | End: 2023-12-20 | Stop reason: SDUPTHER

## 2023-12-14 ENCOUNTER — PRE-ADMISSION TESTING (OUTPATIENT)
Dept: PREADMISSION TESTING | Facility: HOSPITAL | Age: 63
End: 2023-12-14
Payer: MEDICARE

## 2023-12-14 VITALS
RESPIRATION RATE: 20 BRPM | BODY MASS INDEX: 29.71 KG/M2 | HEIGHT: 69 IN | WEIGHT: 200.62 LBS | TEMPERATURE: 98.2 F | SYSTOLIC BLOOD PRESSURE: 126 MMHG | OXYGEN SATURATION: 97 % | DIASTOLIC BLOOD PRESSURE: 85 MMHG | HEART RATE: 81 BPM

## 2023-12-14 PROCEDURE — 99204 OFFICE O/P NEW MOD 45 MIN: CPT | Performed by: PHYSICIAN ASSISTANT

## 2023-12-14 RX ORDER — OMEPRAZOLE 20 MG/1
20 CAPSULE, DELAYED RELEASE ORAL
COMMUNITY

## 2023-12-14 ASSESSMENT — ENCOUNTER SYMPTOMS
PSYCHIATRIC NEGATIVE: 1
BACK PAIN: 1
EYES NEGATIVE: 1
NEUROLOGICAL NEGATIVE: 1
CARDIOVASCULAR NEGATIVE: 1
RESPIRATORY NEGATIVE: 1
CONSTITUTIONAL NEGATIVE: 1
ARTHRALGIAS: 1

## 2023-12-14 ASSESSMENT — DUKE ACTIVITY SCORE INDEX (DASI)
CAN YOU PARTICIPATE IN MODERATE RECREATIONAL ACTIVITIES LIKE GOLF, BOWLING, DANCING, DOUBLES TENNIS OR THROWING A BASEBALL OR FOOTBALL: NO
CAN YOU DO HEAVY WORK AROUND THE HOUSE LIKE SCRUBBING FLOORS OR LIFTING AND MOVING HEAVY FURNITURE: NO
CAN YOU CLIMB A FLIGHT OF STAIRS OR WALK UP A HILL: YES
CAN YOU TAKE CARE OF YOURSELF (EAT, DRESS, BATHE, OR USE TOILET): YES
CAN YOU PARTICIPATE IN STRENOUS SPORTS LIKE SWIMMING, SINGLES TENNIS, FOOTBALL, BASKETBALL, OR SKIING: NO
DASI METS SCORE: 4.3
CAN YOU HAVE SEXUAL RELATIONS: NO
CAN YOU RUN A SHORT DISTANCE: NO
CAN YOU DO LIGHT WORK AROUND THE HOUSE LIKE DUSTING OR WASHING DISHES: YES
CAN YOU WALK A BLOCK OR TWO ON LEVEL GROUND: NO
CAN YOU DO YARD WORK LIKE RAKING LEAVES, WEEDING OR PUSHING A MOWER: NO
CAN YOU DO MODERATE WORK AROUND THE HOUSE LIKE VACUUMING, SWEEPING FLOORS OR CARRYING GROCERIES: NO
TOTAL_SCORE: 12.7
CAN YOU WALK INDOORS, SUCH AS AROUND YOUR HOUSE: YES

## 2023-12-14 ASSESSMENT — CHADS2 SCORE
PRIOR STROKE OR TIA OR THROMBOEMBOLISM: NO
AGE GREATER THAN OR EQUAL TO 75: NO
DIABETES: NO
AGE GREATER THAN OR EQUAL TO 75: NO
CHF: NO
DIABETES: NO
CHADS2 SCORE: 0
PRIOR STROKE OR TIA OR THROMBOEMBOLISM: NO
CHF: NO
HYPERTENSION: NO

## 2023-12-14 ASSESSMENT — PAIN - FUNCTIONAL ASSESSMENT: PAIN_FUNCTIONAL_ASSESSMENT: 0-10

## 2023-12-14 ASSESSMENT — PAIN SCALES - GENERAL: PAINLEVEL_OUTOF10: 4

## 2023-12-14 ASSESSMENT — PAIN DESCRIPTION - DESCRIPTORS: DESCRIPTORS: DULL

## 2023-12-14 NOTE — PREPROCEDURE INSTRUCTIONS
Medication List            Accurate as of December 14, 2023 12:33 PM. Always use your most recent med list.                cyclobenzaprine 10 mg tablet  Commonly known as: Flexeril  Take 1 tablet (10 mg) by mouth 3 times a day as needed for muscle spasms for up to 7 days.  Notes to patient: Take as needed.     ibuprofen 600 mg tablet  Take 1 tablet (600 mg) by mouth every 8 hours if needed for mild pain (1 - 3) or fever (temp greater than 38.0 C).  Notes to patient: STOP TODAY     methocarbamol 500 mg tablet  Commonly known as: Robaxin  Take 1 tablet (500 mg) by mouth 3 times a day as needed for muscle spasms.  Notes to patient: Take as needed.     omeprazole 20 mg DR capsule  Commonly known as: PriLOSEC  Medication Adjustments for Surgery: Take morning of surgery with sip of water, no other fluids     tadalafil 5 mg tablet  Commonly known as: Cialis  Notes to patient: NOT TAKING                          PAT DISCHARGE INSTRUCTIONS    Please call the Same Day Surgery (SDS) Department of the hospital where your procedure will be performed after 2:00 PM the day before your surgery. If you are scheduled on a Monday, or a Tuesday following a Monday holiday, you will need to call on the last business day prior to your surgery.    [facility]  [id number] [place] [address], [address] [phone]    [place] [id number] [place] [address] [phone]    [address]  [place] [id number] [phone]    Please let your surgeon know if:      You develop any open sores, shingles, burning or painful urination as these may increase your risk of an infection.   You no longer wish to have the surgery.   Any other personal circumstances change that may lead to the need to cancel or defer this surgery-such as being sick or getting admitted to any hospital within one week of your planned  procedure.    Your contact details change, such as a change of address or phone number.    Starting now:     Please DO NOT drink alcohol or smoke for 24 hours before surgery. It is well known that quitting smoking can make a huge difference to your health and recovery from surgery. The longer you abstain from smoking, the better your chances of a healthy recovery. If you need help with quitting, call 9-353-QUIT-NOW to be connected to a trained counselor who will discuss the best methods to help you quit.     Before your surgery:    Please stop all supplements 7 days prior to surgery. Or as directed by your surgeon.   Please stop taking NSAID pain medicine such as Advil and Motrin 7 days before surgery.    If you develop any fever, cough, cold, rashes, cuts, scratches, scrapes, urinary symptoms or infection anywhere on your body (including teeth and gums) prior to surgery, please call your surgeon’s office as soon as possible. This may require treatment to reduce the chance of cancellation on the day of surgery.    The day before your surgery:   DIET- Do not eat any food after MIDNIGHT. May have 10 ounces of CLEAR LIQUIDS until TWO HOURS before your arrival time. This includes water, black tea or coffee (no milk ir cream), apple juice and electrolyte drinks (Gatorade). May chew gum until TWO hours before your surgery time.   Get a good night’s rest.  Use the special soap for bathing if you have been instructed to use one.    Scheduled surgery times may change and you will be notified if this occurs - please check your personal voicemail for any updates.     On the morning of surgery:   Wear comfortable, loose fitting clothes which open in the front. Please do not wear moisturizers, creams, lotions, makeup or perfume.    Please bring with you to surgery:   Photo ID and insurance card   Current list of medicines and allergies   Pacemaker/ Defibrillator/Heart stent cards   CPAP machine and mask    Slings/ splints/  crutches   A copy of your complete advanced directive/DHPOA.    Please do NOT bring with you to surgery:   All jewelry and valuables should be left at home.   Prosthetic devices such as contact lenses, hearing aids, dentures, eyelash extensions, hairpins and body piercings must be removed prior to going in to the surgical suite.    After outpatient surgery:   A responsible adult MUST accompany you at the time of discharge and stay with you for 24 hours after your surgery. You may NOT drive yourself home after surgery.    Do not drive, operate machinery, make critical decisions or do activities that require co-ordination or balance until after a night’s sleep.   Do not drink alcoholic beverages for 24 hours.   Instructions for resuming your medications will be provided by your surgeon.    CALL YOUR DOCTOR AFTER SURGERY IF YOU HAVE:     Chills and/or a fever of 101° F or higher.    Redness, swelling, pus or drainage from your surgical wound or a bad smell from the wound.    Lightheadedness, fainting or confusion.    Persistent vomiting (throwing up) and are not able to eat or drink for 12 hours.    Three or more loose, watery bowel movements in 24 hours (diarrhea).   Difficulty or pain while urinating( after non-urological surgery)    Pain and swelling in your legs, especially if it is only on one side.    Difficulty breathing or are breathing faster than normal.    Any new concerning symptoms.

## 2023-12-14 NOTE — CPM/PAT H&P
"CPM/PAT Evaluation       Name: Alfa Cast (Alfa Cast)  /Age: 1960/63 y.o.     In-Person       Chief Complaint: \"back pain\"    HPI  The patient is a 63 year old male.  On 2023 he woke with lumbar spine pain.  He cannot recall a specific injury.  Since that the the pain has been severe and constant in nature and radiates down the lateral aspect of the left hip and into the anterior tibia.  The pain is most severe with weight bearing activities and he has associated leg weakness, instability and falls.  His last fall was yesterday and he is currently ambulating with a cane.  A lumbar spine MRI is recommended at this time.    Past Medical History:   Diagnosis Date    Arthritis     GERD (gastroesophageal reflux disease)     Prostate cancer (CMS/[facility])     URI (upper respiratory infection) 2023       Past Surgical History:   Procedure Laterality Date    PROSTATE SURGERY      Prostatectomy    SHOULDER SURGERY Right     Shoulder reconstruction     Social History     Tobacco Use    Smoking status: Former     Years: 45     Types: Cigarettes, Cigars     Quit date:      Years since quittin.9     Passive exposure: Past    Smokeless tobacco: Never   Substance Use Topics    Alcohol use: Not Currently     Alcohol/week: 6.0 standard drinks of alcohol     Types: 6 Standard drinks or equivalent per week     Social History     Substance and Sexual Activity   Drug Use Never       Allergies   Allergen Reactions    Penicillins Unknown     Current Outpatient Medications   Medication Sig Dispense Refill    ibuprofen 600 mg tablet Take 1 tablet (600 mg) by mouth every 8 hours if needed for mild pain (1 - 3) or fever (temp greater than 38.0 C). 30 tablet 0    tadalafil (Cialis) 5 mg tablet Take 1 tablet (5 mg) by mouth once daily.      cyclobenzaprine (Flexeril) 10 mg tablet Take 1 tablet (10 mg) by mouth 3 times a day as needed for muscle spasms for up to 7 days. 21 tablet 0    methocarbamol (Robaxin) " "500 mg tablet Take 1 tablet (500 mg) by mouth 3 times a day as needed for muscle spasms. 90 tablet 0    omeprazole (PriLOSEC) 20 mg DR capsule Take 1 capsule (20 mg) by mouth. Do not crush or chew.       No current facility-administered medications for this visit.     Review of Systems   Constitutional: Negative.    HENT: Negative.     Eyes: Negative.    Respiratory: Negative.     Cardiovascular: Negative.    Genitourinary: Negative.    Musculoskeletal:  Positive for arthralgias and back pain.   Neurological: Negative.    Psychiatric/Behavioral: Negative.       /85   Pulse 81   Temp 36.8 °C (98.2 °F) (Tympanic)   Resp 20   Ht 1.753 m (5' 9\")   Wt 91 kg (200 lb 9.9 oz)   SpO2 97%   BMI 29.63 kg/m²      Physical Exam  Vitals reviewed.   Constitutional:       Comments: Moderate distress due to back pain.     HENT:      Head: Normocephalic and atraumatic.      Mouth/Throat:      Mouth: Mucous membranes are moist.      Pharynx: Oropharynx is clear.   Eyes:      Extraocular Movements: Extraocular movements intact.      Pupils: Pupils are equal, round, and reactive to light.   Cardiovascular:      Rate and Rhythm: Normal rate and regular rhythm.      Heart sounds: Normal heart sounds.   Pulmonary:      Breath sounds: Normal breath sounds.   Abdominal:      General: Bowel sounds are normal.      Palpations: Abdomen is soft.   Skin:     General: Skin is warm and dry.   Neurological:      General: No focal deficit present.      Mental Status: He is alert and oriented to person, place, and time.   Psychiatric:         Mood and Affect: Mood normal.         Behavior: Behavior normal.        PAT AIRWAY:   Airway:     Mallampati::  II    TM distance::  >3 FB    Neck ROM::  Limited   Few missing teeth noted, remaining teeth in poor condition.    ASA:   II  DASI RISK SCORE:  12.7  METS SCORE:  4.3  CHADS2 Score: 0  REVISED CARDIAC RISK INDEX:  0.4%  STOP BANG SCORE:  2    Assessment and Plan:     Lumbar radiculopathy, " closed compression fracture lumbar 3, acute lower back pain:  Lumbar spine MRI  Smoker    Tamra Mazariegos PA-C

## 2023-12-19 ENCOUNTER — ANESTHESIA EVENT (OUTPATIENT)
Dept: RADIOLOGY | Facility: HOSPITAL | Age: 63
End: 2023-12-19
Payer: MEDICARE

## 2023-12-19 ENCOUNTER — ANESTHESIA (OUTPATIENT)
Dept: RADIOLOGY | Facility: HOSPITAL | Age: 63
End: 2023-12-19
Payer: MEDICARE

## 2023-12-19 ENCOUNTER — HOSPITAL ENCOUNTER (OUTPATIENT)
Dept: RADIOLOGY | Facility: HOSPITAL | Age: 63
Discharge: HOME | End: 2023-12-19
Payer: MEDICARE

## 2023-12-19 VITALS
TEMPERATURE: 97.2 F | OXYGEN SATURATION: 97 % | SYSTOLIC BLOOD PRESSURE: 149 MMHG | RESPIRATION RATE: 14 BRPM | HEART RATE: 66 BPM | DIASTOLIC BLOOD PRESSURE: 88 MMHG

## 2023-12-19 DIAGNOSIS — S32.030A COMPRESSION FRACTURE OF L3 VERTEBRA, INITIAL ENCOUNTER (MULTI): ICD-10-CM

## 2023-12-19 DIAGNOSIS — M54.16 LUMBAR RADICULOPATHY, ACUTE: ICD-10-CM

## 2023-12-19 PROCEDURE — 72148 MRI LUMBAR SPINE W/O DYE: CPT

## 2023-12-19 PROCEDURE — 7100000010 HC PHASE TWO TIME - EACH INCREMENTAL 1 MINUTE

## 2023-12-19 PROCEDURE — 2500000005 HC RX 250 GENERAL PHARMACY W/O HCPCS: Performed by: NURSE ANESTHETIST, CERTIFIED REGISTERED

## 2023-12-19 PROCEDURE — 3700000002 HC GENERAL ANESTHESIA TIME - EACH INCREMENTAL 1 MINUTE

## 2023-12-19 PROCEDURE — 7100000009 HC PHASE TWO TIME - INITIAL BASE CHARGE

## 2023-12-19 PROCEDURE — 2500000004 HC RX 250 GENERAL PHARMACY W/ HCPCS (ALT 636 FOR OP/ED): Performed by: ANESTHESIOLOGY

## 2023-12-19 PROCEDURE — 2500000004 HC RX 250 GENERAL PHARMACY W/ HCPCS (ALT 636 FOR OP/ED): Performed by: NURSE ANESTHETIST, CERTIFIED REGISTERED

## 2023-12-19 PROCEDURE — 3700000001 HC GENERAL ANESTHESIA TIME - INITIAL BASE CHARGE

## 2023-12-19 PROCEDURE — 2500000004 HC RX 250 GENERAL PHARMACY W/ HCPCS (ALT 636 FOR OP/ED)

## 2023-12-19 RX ORDER — LIDOCAINE HYDROCHLORIDE 10 MG/ML
INJECTION INFILTRATION; PERINEURAL AS NEEDED
Status: DISCONTINUED | OUTPATIENT
Start: 2023-12-19 | End: 2023-12-19

## 2023-12-19 RX ORDER — ALBUTEROL SULFATE 0.83 MG/ML
2.5 SOLUTION RESPIRATORY (INHALATION) ONCE
Status: DISCONTINUED | OUTPATIENT
Start: 2023-12-19 | End: 2023-12-19 | Stop reason: HOSPADM

## 2023-12-19 RX ORDER — MIDAZOLAM HYDROCHLORIDE 1 MG/ML
1 INJECTION, SOLUTION INTRAMUSCULAR; INTRAVENOUS ONCE AS NEEDED
Status: DISCONTINUED | OUTPATIENT
Start: 2023-12-19 | End: 2023-12-19 | Stop reason: HOSPADM

## 2023-12-19 RX ORDER — LIDOCAINE HYDROCHLORIDE 10 MG/ML
0.1 INJECTION INFILTRATION; PERINEURAL ONCE
Status: DISCONTINUED | OUTPATIENT
Start: 2023-12-19 | End: 2023-12-19 | Stop reason: HOSPADM

## 2023-12-19 RX ORDER — SODIUM CHLORIDE, SODIUM LACTATE, POTASSIUM CHLORIDE, CALCIUM CHLORIDE 600; 310; 30; 20 MG/100ML; MG/100ML; MG/100ML; MG/100ML
100 INJECTION, SOLUTION INTRAVENOUS CONTINUOUS
Status: DISCONTINUED | OUTPATIENT
Start: 2023-12-19 | End: 2023-12-19 | Stop reason: HOSPADM

## 2023-12-19 RX ORDER — FENTANYL CITRATE 50 UG/ML
INJECTION, SOLUTION INTRAMUSCULAR; INTRAVENOUS AS NEEDED
Status: DISCONTINUED | OUTPATIENT
Start: 2023-12-19 | End: 2023-12-19

## 2023-12-19 RX ORDER — MEPERIDINE HYDROCHLORIDE 25 MG/ML
12.5 INJECTION INTRAMUSCULAR; INTRAVENOUS; SUBCUTANEOUS EVERY 10 MIN PRN
Status: DISCONTINUED | OUTPATIENT
Start: 2023-12-19 | End: 2023-12-19 | Stop reason: HOSPADM

## 2023-12-19 RX ORDER — FENTANYL CITRATE 50 UG/ML
50 INJECTION, SOLUTION INTRAMUSCULAR; INTRAVENOUS EVERY 5 MIN PRN
Status: DISCONTINUED | OUTPATIENT
Start: 2023-12-19 | End: 2023-12-19 | Stop reason: HOSPADM

## 2023-12-19 RX ORDER — PROPOFOL 10 MG/ML
INJECTION, EMULSION INTRAVENOUS CONTINUOUS PRN
Status: DISCONTINUED | OUTPATIENT
Start: 2023-12-19 | End: 2023-12-19

## 2023-12-19 RX ORDER — PROPOFOL 10 MG/ML
INJECTION, EMULSION INTRAVENOUS AS NEEDED
Status: DISCONTINUED | OUTPATIENT
Start: 2023-12-19 | End: 2023-12-19

## 2023-12-19 RX ORDER — KETAMINE HYDROCHLORIDE 10 MG/ML
INJECTION, SOLUTION INTRAMUSCULAR; INTRAVENOUS AS NEEDED
Status: DISCONTINUED | OUTPATIENT
Start: 2023-12-19 | End: 2023-12-19

## 2023-12-19 RX ORDER — ONDANSETRON HYDROCHLORIDE 2 MG/ML
4 INJECTION, SOLUTION INTRAVENOUS ONCE AS NEEDED
Status: DISCONTINUED | OUTPATIENT
Start: 2023-12-19 | End: 2023-12-19 | Stop reason: HOSPADM

## 2023-12-19 RX ORDER — MIDAZOLAM HYDROCHLORIDE 1 MG/ML
INJECTION, SOLUTION INTRAMUSCULAR; INTRAVENOUS AS NEEDED
Status: DISCONTINUED | OUTPATIENT
Start: 2023-12-19 | End: 2023-12-19

## 2023-12-19 RX ADMIN — FENTANYL CITRATE 25 MCG: 0.05 INJECTION, SOLUTION INTRAMUSCULAR; INTRAVENOUS at 13:38

## 2023-12-19 RX ADMIN — LIDOCAINE HYDROCHLORIDE 5 ML: 10 INJECTION, SOLUTION INFILTRATION; PERINEURAL at 13:38

## 2023-12-19 RX ADMIN — FENTANYL CITRATE 25 MCG: 0.05 INJECTION, SOLUTION INTRAMUSCULAR; INTRAVENOUS at 13:41

## 2023-12-19 RX ADMIN — KETAMINE HYDROCHLORIDE 5 MG: 10 INJECTION, SOLUTION INTRAMUSCULAR; INTRAVENOUS at 13:48

## 2023-12-19 RX ADMIN — SODIUM CHLORIDE, SODIUM LACTATE, POTASSIUM CHLORIDE, AND CALCIUM CHLORIDE: 600; 310; 30; 20 INJECTION, SOLUTION INTRAVENOUS at 13:28

## 2023-12-19 RX ADMIN — HYDROMORPHONE HYDROCHLORIDE 0.2 MG: 0.2 INJECTION, SOLUTION INTRAMUSCULAR; INTRAVENOUS; SUBCUTANEOUS at 14:31

## 2023-12-19 RX ADMIN — PROPOFOL 100 MCG/KG/MIN: 10 INJECTION, EMULSION INTRAVENOUS at 13:38

## 2023-12-19 RX ADMIN — KETAMINE HYDROCHLORIDE 15 MG: 10 INJECTION, SOLUTION INTRAMUSCULAR; INTRAVENOUS at 13:38

## 2023-12-19 RX ADMIN — MIDAZOLAM 2 MG: 1 INJECTION INTRAMUSCULAR; INTRAVENOUS at 13:38

## 2023-12-19 RX ADMIN — FENTANYL CITRATE 50 MCG: 0.05 INJECTION, SOLUTION INTRAMUSCULAR; INTRAVENOUS at 13:44

## 2023-12-19 RX ADMIN — PROPOFOL 50 MG: 10 INJECTION, EMULSION INTRAVENOUS at 13:38

## 2023-12-19 RX ADMIN — KETAMINE HYDROCHLORIDE 5 MG: 10 INJECTION, SOLUTION INTRAMUSCULAR; INTRAVENOUS at 13:45

## 2023-12-19 ASSESSMENT — PAIN - FUNCTIONAL ASSESSMENT
PAIN_FUNCTIONAL_ASSESSMENT: 0-10

## 2023-12-19 ASSESSMENT — PAIN DESCRIPTION - DESCRIPTORS
DESCRIPTORS: ACHING

## 2023-12-19 ASSESSMENT — PAIN DESCRIPTION - ORIENTATION: ORIENTATION: LEFT

## 2023-12-19 ASSESSMENT — PAIN SCALES - GENERAL
PAINLEVEL_OUTOF10: 2
PAINLEVEL_OUTOF10: 4
PAINLEVEL_OUTOF10: 3
PAINLEVEL_OUTOF10: 6
PAINLEVEL_OUTOF10: 3
PAINLEVEL_OUTOF10: 3

## 2023-12-19 ASSESSMENT — PAIN DESCRIPTION - LOCATION: LOCATION: BACK

## 2023-12-19 NOTE — ANESTHESIA POSTPROCEDURE EVALUATION
Patient: Alfa Cast    Procedure Summary       Date: 12/19/23 Room / Location: [facility]    Anesthesia Start: 1328 Anesthesia Stop: 1418    Procedure: MR LUMBAR SPINE WO CONTRAST Diagnosis:       Compression fracture of L3 vertebra, initial encounter (CMS/[facility])      Lumbar radiculopathy, acute      (lumbar radiculopathy and left leg weakness. MRI to also evaluate chronicity of L3 fracture from CT Scan. Patient with history of prostate cancer)    Scheduled Providers:  Responsible Provider: Erik Cervantes DO    Anesthesia Type: MAC ASA Status: 3            Anesthesia Type: MAC    Vitals Value Taken Time   /71 12/19/23 1456   Temp 36.2 °C (97.2 °F) 12/19/23 1420   Pulse 58 12/19/23 1456   Resp 17 12/19/23 1456   SpO2 95 % 12/19/23 1456   Vitals shown include unvalidated device data.    Anesthesia Post Evaluation    Patient location during evaluation: bedside  Patient participation: complete - patient participated  Level of consciousness: awake  Pain management: adequate  Airway patency: patent  Cardiovascular status: acceptable  Respiratory status: acceptable  Hydration status: acceptable  Postoperative Nausea and Vomiting: none        There were no known notable events for this encounter.

## 2023-12-19 NOTE — ANESTHESIA PREPROCEDURE EVALUATION
Patient: Alfa Cast    Procedure Information       Date/Time: 12/19/23 1300    Procedure: MR LUMBAR SPINE WO CONTRAST    Location: [facility]            Relevant Problems   Anesthesia (within normal limits)      /Renal   (+) Urinary tract infection      Musculoskeletal   (+) Primary osteoarthritis of left hip      Infectious Disease   (+) Urinary tract infection       Clinical information reviewed:   Tobacco  Allergies  Meds  Problems  Med Hx  Surg Hx   Fam Hx  Soc   Hx        NPO Detail:  NPO/Void Status  Carbonhydrate Drink Given Prior to Surgery? : N  Date of Last Liquid: 12/18/23  Time of Last Liquid: 0800  Date of Last Solid: 12/18/23  Time of Last Solid: 2200  Last Intake Type: Clear fluids; Solid meal  Time of Last Void: 1000         Physical Exam    Airway  Mallampati: II  TM distance: >3 FB     Cardiovascular    Dental - normal exam     Pulmonary    Abdominal            Anesthesia Plan    ASA 3     MAC     intravenous induction   Anesthetic plan and risks discussed with patient.

## 2023-12-19 NOTE — POST-PROCEDURE NOTE
1504- pt brought back from pacu,verbal report received. Pt is alert and awake. Ride called for pickup.     1525- pt getting dressed at this time with steady gait.     1535-  vss. Discharge instructions (written form) given to pt. Pt verbally understood.     1550- pt brought down to [facility] via wheelchair by this nurse where he was picked up by his friend (diana).   
Patient/Caregiver provided printed discharge information.

## 2023-12-20 DIAGNOSIS — S32.030A CLOSED COMPRESSION FRACTURE OF L3 LUMBAR VERTEBRA, INITIAL ENCOUNTER (MULTI): ICD-10-CM

## 2023-12-20 RX ORDER — OXYCODONE HYDROCHLORIDE 5 MG/1
5 TABLET ORAL EVERY 6 HOURS PRN
Qty: 21 TABLET | Refills: 0 | Status: SHIPPED | OUTPATIENT
Start: 2023-12-20 | End: 2023-12-27

## 2023-12-27 ENCOUNTER — OFFICE VISIT (OUTPATIENT)
Dept: ORTHOPEDIC SURGERY | Facility: CLINIC | Age: 63
End: 2023-12-27
Payer: MEDICARE

## 2023-12-27 DIAGNOSIS — M99.76: ICD-10-CM

## 2023-12-27 DIAGNOSIS — M54.16 LUMBAR RADICULOPATHY, ACUTE: Primary | ICD-10-CM

## 2023-12-27 PROCEDURE — 1125F AMNT PAIN NOTED PAIN PRSNT: CPT | Performed by: ORTHOPAEDIC SURGERY

## 2023-12-27 PROCEDURE — 1036F TOBACCO NON-USER: CPT | Performed by: ORTHOPAEDIC SURGERY

## 2023-12-27 PROCEDURE — 99214 OFFICE O/P EST MOD 30 MIN: CPT | Performed by: ORTHOPAEDIC SURGERY

## 2023-12-27 PROCEDURE — 1159F MED LIST DOCD IN RCRD: CPT | Performed by: ORTHOPAEDIC SURGERY

## 2023-12-27 PROCEDURE — 1160F RVW MEDS BY RX/DR IN RCRD: CPT | Performed by: ORTHOPAEDIC SURGERY

## 2023-12-27 ASSESSMENT — PAIN SCALES - GENERAL: PAINLEVEL_OUTOF10: 3

## 2023-12-27 ASSESSMENT — PAIN DESCRIPTION - DESCRIPTORS: DESCRIPTORS: ACHING

## 2023-12-27 ASSESSMENT — PAIN - FUNCTIONAL ASSESSMENT: PAIN_FUNCTIONAL_ASSESSMENT: 0-10

## 2023-12-27 NOTE — PROGRESS NOTES
S: Alfa Cast is a 63 y.o. year old male patient who is here to follow up on his MRI of his lumbar spine.  He was last seen in my clinic on November 29, 2023.  He has a history of prostate cancer status post prostatectomy 2-1/2 years ago at [facility] with history of residual urinary incontinence.  When he last saw me he was complaining of severe left flank pain with pain radiating down the left leg into his left buttock thigh shin to his ankle only on the left side nothing on the right.  He is otherwise neurologically intact.  He had a CT scan in November 24, 2023 in the emergency room which suggested possible L3 compression fracture with unknown chronicity.  He has been on oxycodone.  Since I last saw him he states that his pain is slightly improved he is able to move a little bit better but he still has the radiating pain down his left leg.  No new weakness.  At the last visit I had sent him for an MRI of the lumbar spine given his history of prostate cancer and fracture seen on CT scan.  He is here to follow-up on the MRI.  Currently he is on oxycodone only at night.  He is also taking the Robaxin which I gave him at the last visit which provides some relief.      O:     General: Patient appears well-nourished and well-developed in no acute distress, Alert and Oriented x3  Psych: Pleasant mood and affect  HEENT: Extraocular muscles intact, pupils equal and round. Sclerae anicteric   Cardio: extremities warm and well perfused  Resp: unlabored symmetric breathing  Skin: no open wounds or rash  Musculoskeletal/Neuro Exam: Walks leaning forward with a cane.  Mild tenderness to palpation along the left flank.  Positive straight leg raise on the left negative on the right tight hamstrings bilaterally.      Lower extremity     Motor: Right leg with 5 out of 5 motor strength with hip flexion, knee extension, ankle dorsiflexion plantarflexion and EHL against resistance.  Left leg with 5 4 out of 5 motor strength  with hip flexion, knee extension limited by pain, 5 out of 5 ankle dorsiflexion plantarflexion EHL against resistance  Sensation to light touch intact along L2 to S1 distribution bilaterally but diminished on the left compared to right in all distribution          Imaging:    I reviewed x-rays obtained in clinic on November 29, 2023.  AP lateral flexion-extension views.  He has mild curvature at the thoracolumbar junction.  On lateral view he has degenerative changes with endplate sclerosis between L1-L2 L2-L3 with some compression involving the superior endplate of L3 no dynamic instability with flexion extension.     I reviewed the CT scan of the lumbar spine from November 24, 2023 which shows mild compression of the L3 superior endplate with no retropulsed bone of unknown chronicity but degenerative changes in the lumbar spine similar to the x-ray.      I reviewed the MRI of the lumbar spine from December 19, 2023 which shows multilevel degenerative changes but severe bilateral foraminal stenosis at L4-L5 left worse than right with mild to moderate central canal stenosis.  Otherwise the fracture at L3 appears to be chronic.  It does not light up on STIR.  No evidence of metastatic lesions.  There is incidental finding of left kidney cyst which patient is aware of.      A/P: Afla Cast is a 63 y.o. year old male patient with ongoing left back pain with left radicular leg pain for about a month since Thanksgiving of 2023.  MRI shows foraminal stenosis at L4-L5.  Left worse than right.  I reviewed the images with him in clinic today.  I discussed conservative versus operative management.  Conservative options include physical therapy, transforaminal epidural steroid injection at L4-L5, medication management.  Surgery would likely involve a decompression and instrumented fusion to do a complete facetectomy to decompress the foramen.  At this point since he has not had any conservative treatments I would recommend  conservative treatments first with physical therapy and injections.  He was amendable to this.  He was given prescription for physical therapy and pain management for injections.  I also started him on medication for gabapentin to help with his pain.  I advised him not to take this with his narcotic.  I advised him to take his narcotic sparingly.  I also advised him that sometimes gabapentin does cause drowsiness and to avoid taking it if he is driving, making any important decisions, or operating any heavy machinery's.  If he does not see any improvement with therapy and injections then I would like to see him back in clinic. After our discussion, the patient articulated understanding of the plan and felt that all questions had been answered satisfactorily. The patient was pleased with the visit and very appreciative for the care rendered.    **Please excuse any errors in grammar or translation related to this dictation. Voice recognition software was utilized to prepare this document. **                  Honey Gonzalez MD    Department of Orthopaedic Surgery  [facility]  [email or web address]

## 2023-12-28 RX ORDER — GABAPENTIN 300 MG/1
300 CAPSULE ORAL 3 TIMES DAILY
Qty: 90 CAPSULE | Refills: 0 | Status: SHIPPED | OUTPATIENT
Start: 2023-12-28 | End: 2024-01-25 | Stop reason: SINTOL

## 2024-01-02 ENCOUNTER — APPOINTMENT (OUTPATIENT)
Dept: PRIMARY CARE | Facility: CLINIC | Age: 64
End: 2024-01-02
Payer: MEDICARE

## 2024-01-03 ENCOUNTER — OFFICE VISIT (OUTPATIENT)
Dept: PAIN MEDICINE | Facility: CLINIC | Age: 64
End: 2024-01-03
Payer: MEDICARE

## 2024-01-03 VITALS
HEART RATE: 70 BPM | SYSTOLIC BLOOD PRESSURE: 132 MMHG | WEIGHT: 205 LBS | BODY MASS INDEX: 29.35 KG/M2 | DIASTOLIC BLOOD PRESSURE: 76 MMHG | HEIGHT: 70 IN

## 2024-01-03 DIAGNOSIS — M54.16 LUMBAR RADICULOPATHY: Primary | ICD-10-CM

## 2024-01-03 DIAGNOSIS — M51.36 DDD (DEGENERATIVE DISC DISEASE), LUMBAR: ICD-10-CM

## 2024-01-03 PROCEDURE — 1160F RVW MEDS BY RX/DR IN RCRD: CPT | Performed by: STUDENT IN AN ORGANIZED HEALTH CARE EDUCATION/TRAINING PROGRAM

## 2024-01-03 PROCEDURE — 1125F AMNT PAIN NOTED PAIN PRSNT: CPT | Performed by: STUDENT IN AN ORGANIZED HEALTH CARE EDUCATION/TRAINING PROGRAM

## 2024-01-03 PROCEDURE — 99214 OFFICE O/P EST MOD 30 MIN: CPT | Performed by: STUDENT IN AN ORGANIZED HEALTH CARE EDUCATION/TRAINING PROGRAM

## 2024-01-03 PROCEDURE — 1159F MED LIST DOCD IN RCRD: CPT | Performed by: STUDENT IN AN ORGANIZED HEALTH CARE EDUCATION/TRAINING PROGRAM

## 2024-01-03 PROCEDURE — 99204 OFFICE O/P NEW MOD 45 MIN: CPT | Performed by: STUDENT IN AN ORGANIZED HEALTH CARE EDUCATION/TRAINING PROGRAM

## 2024-01-03 PROCEDURE — 1036F TOBACCO NON-USER: CPT | Performed by: STUDENT IN AN ORGANIZED HEALTH CARE EDUCATION/TRAINING PROGRAM

## 2024-01-03 ASSESSMENT — ENCOUNTER SYMPTOMS
DEPRESSION: 0
LOSS OF SENSATION IN FEET: 0
OCCASIONAL FEELINGS OF UNSTEADINESS: 1

## 2024-01-03 ASSESSMENT — PAIN - FUNCTIONAL ASSESSMENT: PAIN_FUNCTIONAL_ASSESSMENT: 0-10

## 2024-01-03 ASSESSMENT — PAIN SCALES - GENERAL
PAINLEVEL_OUTOF10: 4
PAINLEVEL: 4

## 2024-01-03 ASSESSMENT — PATIENT HEALTH QUESTIONNAIRE - PHQ9
SUM OF ALL RESPONSES TO PHQ9 QUESTIONS 1 AND 2: 0
2. FEELING DOWN, DEPRESSED OR HOPELESS: NOT AT ALL
1. LITTLE INTEREST OR PLEASURE IN DOING THINGS: NOT AT ALL

## 2024-01-03 ASSESSMENT — LIFESTYLE VARIABLES: TOTAL SCORE: 0

## 2024-01-03 ASSESSMENT — PAIN DESCRIPTION - DESCRIPTORS: DESCRIPTORS: ACHING;SHARP;SHOOTING;RADIATING

## 2024-01-03 NOTE — PROGRESS NOTES
[facility]  New Patient Office Visit Note 1/3/2024    Patient Information: Alfa Cast, MRN: [id number], : 1960   Primary Care/Referring Physician: Veda Vital DO, [address] [place] / [place] [address]     Chief Complaint: Low  back and left leg pain  History of Pain: Mr. Alfa Cast is a 63 y.o. male with a PMHx of prostate cancer who presents for low back and left leg pain.    He reports onset of pain a few days before 2023. There was no obvious injury prior to this happening. He initially had left sided low back and buttock pain which then progressed to pain radiating further down his posterolateral leg to the front of his left shin. His pain worsens with twisting movements and lying down flat and improves with sitting and lying on his right side. He reports some numbness along his left anterior shin    Was initially evaluated with a CT, with concern for L3 compression fracture. Given his history of prostate cancer a lumbar spine MRI was ordered, which shows likely chronic fracture at L3 and evidence of severe foraminal stenosis at L4/5. He was evaluated by Dr. Gonzalez who discussed possible foraminotomy but the patient would rather try more conservative measures first    Current Pain Medications: Gabapentin 300 mg TID, a few small prescriptions for Oxycodone  Previously Tried Pain Medications: Norco, Cyclobenzaprine, Methocarbamol, PO steroids, OTC Ibuprofen    Relevant Surgeries: Denies  Injections: Denies  Physical/Occupational Therapy: He was referred to PT but has not started this yet    Medications:   Current Outpatient Medications   Medication Instructions    cyclobenzaprine (FLEXERIL) 10 mg, oral, 3 times daily PRN    gabapentin (NEURONTIN) 300 mg, oral, 3 times daily    ibuprofen 600 mg, oral, Every 8 hours PRN    methocarbamol (ROBAXIN) 500 mg, oral, 3 times daily PRN    omeprazole (PRILOSEC) 20 mg, oral, Do not crush or chew.    tadalafil (Cialis) 5 mg  tablet 1 tablet, oral, Daily      Allergies:   Allergies   Allergen Reactions    Penicillins Unknown       Past Medical & Surgical History:  Past Medical History:   Diagnosis Date    Arthritis     Chronic pain disorder     GERD (gastroesophageal reflux disease)     Prostate cancer (CMS/[facility])     URI (upper respiratory infection) 2023      Past Surgical History:   Procedure Laterality Date    HERNIA REPAIR      PROSTATE SURGERY      Prostatectomy    SHOULDER SURGERY Right     Shoulder reconstruction       Family History   Problem Relation Name Age of Onset    Brain cancer Mother       Social History     Socioeconomic History    Marital status: Single     Spouse name: Not on file    Number of children: Not on file    Years of education: Not on file    Highest education level: Not on file   Occupational History    Not on file   Tobacco Use    Smoking status: Former     Years: 45     Types: Cigarettes, Cigars     Quit date:      Years since quittin.0     Passive exposure: Past    Smokeless tobacco: Never   Vaping Use    Vaping Use: Never used   Substance and Sexual Activity    Alcohol use: Not Currently     Alcohol/week: 6.0 standard drinks of alcohol     Types: 6 Standard drinks or equivalent per week     Comment: 4 drinks weekly    Drug use: Never    Sexual activity: Not on file   Other Topics Concern    Not on file   Social History Narrative    Not on file     Social Determinants of Health     Financial Resource Strain: Not on file   Food Insecurity: Not on file   Transportation Needs: Not on file   Physical Activity: Not on file   Stress: Not on file   Social Connections: Not on file   Intimate Partner Violence: Not on file   Housing Stability: Not on file       Problems, Past medical history, past surgical history, Medications, allergies, social and family history reviewed and as per the electronic medical record from today's encounter    Review of Systems:  CONST: No fever, chills, fatigue, weight  "changes  EYES: No loss of vision  ENT: No hearing loss, tinnitus  CV: No chest pain, palpitations  RESP: No dyspnea, shortness of breath, cough  GI: No stool incontinence, nausea, vomiting  : Reports urinary incontinence (has this at baseline since prostate cancer)  MSK: No joint swelling  SKIN: No rash, no hives  NEURO: No headache, dizziness  PSYCH: No anxiety, depression or suicidal ideation  HEM/LYMPH: No easy bruising or bleeding  All other systems reviewed are negative     Physical Exam:  Vitals: /76   Pulse 70   Ht 1.778 m (5' 10\")   Wt 93 kg (205 lb)   BMI 29.41 kg/m²   General: No apparent distress. Alert, appropriate, oriented x 3. Mood and affect normal. Speaking in full sentences.  HENT: Normocephalic, atraumatic. Hearing intact.  Eyes: Extraocular movements grossly intact. Pupils equal and round.   Neck: Supple, trachea midline.  Lungs: Symmetric respiratory excursion on visual exam, nonlabored breathing.  Extremities: No clubbing, cyanosis, or edema noted in arms or legs.  Skin: No rashes, lesions, alopecia noted on back or extremities.   Back: No tenderness over the spinous processes. Reports pain to palpation of lower left lumbar paraspinal muscles and gluteal muscles. No tenderness to palpation of GTB bilaterally. Straight leg raise test positive on the left. No spasm noted over musculature. No misalignment or asymmetry noted.  Neuro: Alert and appropriate. Motor strength 5/5 throughout right lower extremity and 4+/5 with left hip flexion, 4+/5 with left knee flexion/extension, and 5/5 with left foot plantarflexion/dorsiflexion. Sensory intact to light touch bilateral lower extremities. Gait antalgic. Bulk and tone within normal limits.    Laboratory Data:  The following laboratory data were reviewed during this visit:   Lab Results   Component Value Date    WBC 4.4 11/24/2023    RBC 4.31 (L) 11/24/2023    HGB 13.3 (L) 11/24/2023    HCT 39.4 (L) 11/24/2023     11/24/2023      No " "results found for: \"INR\"  Lab Results   Component Value Date    CREATININE 0.63 11/24/2023       Imaging:  The following imaging impressions were reviewed by me during this visit:    -12/19/23 lumbar spine MRI shows mild loss of height of the superior endplate of L3 with no associated increased STIR. L2-3 mild-to-moderate right neural foraminal narrowing. L3-4 moderate left foraminal narrowing. L4-5 mild circumferential disc bulge with ligamentum flavum hypertrophy and facet arthropathy results in mild-to-moderate spinal canal narrowing, severe left neural foraminal narrowing with contact upon the exiting left-sided nerve root and moderate-to-severe right neural foraminal narrowing.    I also personally reviewed the images from the above studies myself. These images and my interpretation of them contributed to the management and decision making of the patient's medical plan.    ASSESSMENT:  Mr. Alfa Cast is a 63 y.o. male with low back and left leg pain that is consistent with:    1. Lumbar radiculopathy    2. DDD (degenerative disc disease), lumbar        PLAN:    Diagnostics:   -Reviewed his recent lumbar spine MRI which is most significant for severe foraminal narrowing on the left at L4/5.  No additional diagnostics at this time    Physical Therapy and Rehabilitation:     -He was referred to physical therapy but has not started this yet.  Given the severity of his current pain I do not believe he will be able to meaningfully participate in PT.  I recommend he delay this for now and if his pain improves after intervention he can return to this.    Psychologically:  -No needs at this time    Medications  - Continue Gabapentin 300 mg TID  - Continue OTC Ibuprofen    Duration  - 1.5 months    Interventions:  -I suspect his pain is primarily due to lumbar radiculopathy in the setting of severe foraminal narrowing on the left at L4/5.  His pain remains severe despite activity modification and NSAID's, and is " limiting his daily function as well as sleep.  At this juncture I believe he would benefit from a left transforaminal ELOINA at L4/5 utilizing live fluoroscopy and local anesthesia.  Risk, benefits, alternatives discussed.  He would like to proceed.        Sincerely,  Andrzej Stahl MD  [facility] [place]

## 2024-01-05 ENCOUNTER — ANCILLARY PROCEDURE (OUTPATIENT)
Dept: RADIOLOGY | Facility: CLINIC | Age: 64
End: 2024-01-05
Payer: MEDICARE

## 2024-01-05 ENCOUNTER — OUTSIDE PROCEDURE (OUTPATIENT)
Dept: PAIN MEDICINE | Facility: CLINIC | Age: 64
End: 2024-01-05
Payer: MEDICARE

## 2024-01-05 DIAGNOSIS — M54.16 RADICULOPATHY, LUMBAR REGION: ICD-10-CM

## 2024-01-05 DIAGNOSIS — M54.16 LUMBAR RADICULOPATHY: Primary | ICD-10-CM

## 2024-01-05 PROCEDURE — 77003 FLUOROGUIDE FOR SPINE INJECT: CPT | Mod: RSC

## 2024-01-18 PROBLEM — F41.9 ANXIETY: Status: ACTIVE | Noted: 2021-02-01

## 2024-01-18 PROBLEM — F17.210 CIGARETTE SMOKER: Status: RESOLVED | Noted: 2023-06-27 | Resolved: 2024-01-18

## 2024-01-18 PROBLEM — M51.369 DEGENERATION OF INTERVERTEBRAL DISC OF LUMBAR REGION: Status: ACTIVE | Noted: 2024-01-18

## 2024-01-18 PROBLEM — M51.36 DEGENERATION OF INTERVERTEBRAL DISC OF LUMBAR REGION: Status: ACTIVE | Noted: 2024-01-18

## 2024-01-18 PROBLEM — M54.16 LUMBAR RADICULOPATHY: Status: ACTIVE | Noted: 2024-01-18

## 2024-01-18 PROBLEM — K21.00 GASTROESOPHAGEAL REFLUX DISEASE WITH ESOPHAGITIS: Status: ACTIVE | Noted: 2017-12-13

## 2024-01-18 PROBLEM — H93.19 TINNITUS: Status: ACTIVE | Noted: 2021-02-01

## 2024-01-18 RX ORDER — TRIAMCINOLONE ACETONIDE 1 MG/G
CREAM TOPICAL
COMMUNITY
End: 2024-02-28 | Stop reason: WASHOUT

## 2024-01-18 RX ORDER — HYDROXYZINE PAMOATE 25 MG/1
CAPSULE ORAL
COMMUNITY
End: 2024-02-28 | Stop reason: WASHOUT

## 2024-01-18 RX ORDER — MINERAL OIL
ENEMA (ML) RECTAL
COMMUNITY
Start: 2016-08-29 | End: 2024-02-28 | Stop reason: WASHOUT

## 2024-01-18 RX ORDER — ESOMEPRAZOLE MAGNESIUM 40 MG/1
40 CAPSULE, DELAYED RELEASE ORAL
COMMUNITY
Start: 2017-11-29 | End: 2024-02-28 | Stop reason: WASHOUT

## 2024-01-18 NOTE — PROGRESS NOTES
Subjective   Patient ID: Alfa Cast is a 63 y.o. male who presents for Follow-up (Very bad back pain, seeing pain management, may be having surgery on spine; discuss lung xray results; ).    HPI   Prostate cancer: He is going to [facility] for pelvic floor PT. s/p robotic prostatectomy with Dr. Nel Hawthorne in March 2021. He has a followup with the surgeon's team at [facility] next month. Recently started on the cialis. He went to pelvic floor PT x2 for 4 or 5 months apiece and he only had slight temporary improvement. He states that his issue is that there is no sensation beforehand to warn him that he needs to urinate.      Getting more active. He was on wellbutrin and it was doubled up. He gave up smoking for lent!!!     Back pain: Followed with Dr. Stahl. Doing Pain Management and PT. Getting a nerve block and gabapentin. The oxycodone and ibuprofen helped. Discussed possible back surgery in the future. Woke up the day before thanksgiving and his back was very sore. He saw Honey Gonzalez. She did MRI. He was sedated for the MRI. Might need to have pins in place.      GERD: Controlled on omeprazole.      Tinnitus: He has a history of tinnitus, not using hearing aids. [facility]      Dermatitis: He has been having skin dryness and splitting. He uses a working hands otx ointment. He is also having an unusual problem with bruising since he got his flut shot this year. He is experiencing some dehydration because of his bladder symptoms.     Cigarette smoker: Quit for Lent!!     Had a low dose lung cancer screening CT done July 2023. There are multiple nodules 4mm that are considered to be benign, will check yearly.   It was also noted that he has some calcifications around coronary arteries. No recent stress test on file.     Hyperlipidemia: Robson risk score 15.3% base on lipid panel   BP is elevated today     [facility] during [facility]        He has a special needs son, age 22, who came out here from [place] to get access to a school in the area.     All other systems have been reviewed and are negative for complaint       Review of Systems   Constitutional:  Negative for chills and fever.   HENT:  Negative for congestion, ear pain and rhinorrhea.    Eyes:  Negative for discharge and redness.   Respiratory:  Negative for cough, shortness of breath and wheezing.    Cardiovascular:  Negative for chest pain and leg swelling.   Gastrointestinal:  Negative for abdominal pain, constipation, diarrhea, nausea and vomiting.   Genitourinary:  Negative for difficulty urinating, frequency and urgency.   Musculoskeletal:  Negative for gait problem.   Skin:  Negative for rash and wound.   Neurological:  Negative for dizziness, weakness and headaches.   Psychiatric/Behavioral:  Negative for confusion. The patient is not nervous/anxious.        Objective   /88 (BP Location: Right arm, Patient Position: Sitting, BP Cuff Size: Large adult)   Pulse 59   Wt 93.5 kg (206 lb 3.2 oz)   BMI 29.59 kg/m²     Physical Exam  Constitutional:       Appearance: Normal appearance.   Cardiovascular:      Rate and Rhythm: Normal rate and regular rhythm.   Pulmonary:      Effort: Pulmonary effort is normal.      Breath sounds: Normal breath sounds.   Skin:     General: Skin is warm and dry.   Neurological:      Mental Status: He is alert and oriented to person, place, and time. Mental status is at baseline.   Psychiatric:         Mood and Affect: Mood normal.         Behavior: Behavior normal.       Assessment/Plan         #Prostate cancer  #Urinary incontinence  #ED  s/p radical prostatectomy  he has been to pelvic floor PT without much improvement  Monitoring PSA with [facility]      #Tinnitus  discussed supportive care  He is going to see ENT next month     #Back pain   Following with pain management   Getting injections   Taking gabapentin (This may be worsening  urinary incontinence)   Thinking about going back to Dr. Gonzalez for possible surgery     #Cigarette smoker  wellbutrin is helpful  Cigar once per day   Quit for a long time  Ppd >20 years   CT lung cancer screening   Lung nodules noted likely benign   Would recheck at his follow up in July 2024    #CAD  Calcifications noted on CT scan for lungs   Ordering stress test today   Discussed diet   Monitor lipids     #GERD  Controlled on otc omeprazole     HCM:  UTD for COVID, PPSV23, and flu shots  last C-scope was 2021, next due 2026

## 2024-01-22 ENCOUNTER — OFFICE VISIT (OUTPATIENT)
Dept: PRIMARY CARE | Facility: CLINIC | Age: 64
End: 2024-01-22
Payer: MEDICARE

## 2024-01-22 VITALS
SYSTOLIC BLOOD PRESSURE: 152 MMHG | WEIGHT: 206.2 LBS | DIASTOLIC BLOOD PRESSURE: 88 MMHG | HEART RATE: 59 BPM | BODY MASS INDEX: 29.59 KG/M2

## 2024-01-22 DIAGNOSIS — E78.2 MIXED HYPERLIPIDEMIA: ICD-10-CM

## 2024-01-22 DIAGNOSIS — I25.10 CORONARY ARTERY DISEASE INVOLVING NATIVE CORONARY ARTERY OF NATIVE HEART WITHOUT ANGINA PECTORIS: ICD-10-CM

## 2024-01-22 DIAGNOSIS — C61 PROSTATE CANCER (MULTI): ICD-10-CM

## 2024-01-22 DIAGNOSIS — M51.36 DEGENERATION OF INTERVERTEBRAL DISC OF LUMBAR REGION: ICD-10-CM

## 2024-01-22 DIAGNOSIS — R03.0 ELEVATED BLOOD PRESSURE READING: Primary | ICD-10-CM

## 2024-01-22 DIAGNOSIS — I25.10 CORONARY ARTERY CALCIFICATION SEEN ON CT SCAN: ICD-10-CM

## 2024-01-22 PROCEDURE — 1125F AMNT PAIN NOTED PAIN PRSNT: CPT | Performed by: REGISTERED NURSE

## 2024-01-22 PROCEDURE — 1160F RVW MEDS BY RX/DR IN RCRD: CPT | Performed by: REGISTERED NURSE

## 2024-01-22 PROCEDURE — 1159F MED LIST DOCD IN RCRD: CPT | Performed by: REGISTERED NURSE

## 2024-01-22 PROCEDURE — 1036F TOBACCO NON-USER: CPT | Performed by: REGISTERED NURSE

## 2024-01-22 PROCEDURE — 99214 OFFICE O/P EST MOD 30 MIN: CPT | Performed by: REGISTERED NURSE

## 2024-01-22 ASSESSMENT — ENCOUNTER SYMPTOMS
DIFFICULTY URINATING: 0
NERVOUS/ANXIOUS: 0
CHILLS: 0
NAUSEA: 0
FREQUENCY: 0
DIZZINESS: 0
COUGH: 0
ABDOMINAL PAIN: 0
FEVER: 0
CONFUSION: 0
RHINORRHEA: 0
EYE REDNESS: 0
WOUND: 0
EYE DISCHARGE: 0
DIARRHEA: 0
VOMITING: 0
WEAKNESS: 0
SHORTNESS OF BREATH: 0
HEADACHES: 0
WHEEZING: 0
CONSTIPATION: 0

## 2024-01-22 NOTE — PATIENT INSTRUCTIONS
Radiology:  [place]:  [phone]    [place]:  [phone] opt 2     Central Scheduling:  Radiology: [phone]  [facility] [place]: [phone]    [facility] [phone]

## 2024-01-25 ENCOUNTER — OFFICE VISIT (OUTPATIENT)
Dept: PAIN MEDICINE | Facility: CLINIC | Age: 64
End: 2024-01-25
Payer: MEDICARE

## 2024-01-25 VITALS
DIASTOLIC BLOOD PRESSURE: 84 MMHG | HEART RATE: 70 BPM | SYSTOLIC BLOOD PRESSURE: 144 MMHG | BODY MASS INDEX: 30.51 KG/M2 | RESPIRATION RATE: 18 BRPM | WEIGHT: 206 LBS | HEIGHT: 69 IN

## 2024-01-25 DIAGNOSIS — M51.36 DDD (DEGENERATIVE DISC DISEASE), LUMBAR: ICD-10-CM

## 2024-01-25 DIAGNOSIS — M54.16 LUMBAR RADICULOPATHY, ACUTE: Primary | ICD-10-CM

## 2024-01-25 PROCEDURE — 1159F MED LIST DOCD IN RCRD: CPT | Performed by: STUDENT IN AN ORGANIZED HEALTH CARE EDUCATION/TRAINING PROGRAM

## 2024-01-25 PROCEDURE — 99213 OFFICE O/P EST LOW 20 MIN: CPT | Performed by: STUDENT IN AN ORGANIZED HEALTH CARE EDUCATION/TRAINING PROGRAM

## 2024-01-25 PROCEDURE — 1036F TOBACCO NON-USER: CPT | Performed by: STUDENT IN AN ORGANIZED HEALTH CARE EDUCATION/TRAINING PROGRAM

## 2024-01-25 PROCEDURE — 1160F RVW MEDS BY RX/DR IN RCRD: CPT | Performed by: STUDENT IN AN ORGANIZED HEALTH CARE EDUCATION/TRAINING PROGRAM

## 2024-01-25 PROCEDURE — 1125F AMNT PAIN NOTED PAIN PRSNT: CPT | Performed by: STUDENT IN AN ORGANIZED HEALTH CARE EDUCATION/TRAINING PROGRAM

## 2024-01-25 ASSESSMENT — PAIN DESCRIPTION - DESCRIPTORS: DESCRIPTORS: SHARP

## 2024-01-25 ASSESSMENT — PAIN SCALES - GENERAL
PAINLEVEL: 3
PAINLEVEL_OUTOF10: 3

## 2024-01-25 ASSESSMENT — PAIN - FUNCTIONAL ASSESSMENT: PAIN_FUNCTIONAL_ASSESSMENT: 0-10

## 2024-01-25 NOTE — PROGRESS NOTES
[facility]  Follow Up Office Visit Note 2024    Patient Information: Alfa Cast, MRN: [id number], : 1960   Primary Care/Referring Physician: Veda Vital DO, [address] [place] / [place] [address]     Chief Complaint: Low  back and left leg pain    Interval History: Mr. Cast presents for follow up after left L4 TFESI    Today he reports around 80% improvement in his pain since this injection. He is still getting some pain inw the low back and left leg but it is much less intense after the procedure. Recently he has been having similar but overall mild pain in the right leg as well.     He has been having more vivid dreams and more confusion recently, which he thinks might be due to Gabapentin. He has also been having more incontinence recently (has had issues with this since his prostate surgery)    Brief History of Pain: Mr. Alfa Cast is a 63 y.o. male with a PMHx of prostate cancer who presents for low back and left leg pain.    He reports onset of pain a few days before 2023. There was no obvious injury prior to this happening. He initially had left sided low back and buttock pain which then progressed to pain radiating further down his posterolateral leg to the front of his left shin. His pain worsens with twisting movements and lying down flat and improves with sitting and lying on his right side. He reports some numbness along his left anterior shin    Was initially evaluated with a CT, with concern for L3 compression fracture. Given his history of prostate cancer a lumbar spine MRI was ordered, which shows likely chronic fracture at L3 and evidence of severe foraminal stenosis at L4/5. He was evaluated by Dr. Gonzalez who discussed possible foraminotomy but the patient would rather try more conservative measures first    Current Pain Medications: Gabapentin 300 mg TID, a few small prescriptions for Oxycodone  Previously Tried Pain Medications: Norco,  Cyclobenzaprine, Methocarbamol, PO steroids, OTC Ibuprofen    Relevant Surgeries: Denies  Injections: Left L4 TFESI - 80% pain relief  Physical/Occupational Therapy: He was referred to PT but has not started this yet    Medications:   Current Outpatient Medications   Medication Instructions    cyclobenzaprine (FLEXERIL) 10 mg, oral, 3 times daily PRN    esomeprazole (NEXIUM) 40 mg, oral    fexofenadine (Allegra) 180 mg tablet     hydrOXYzine pamoate (Vistaril) 25 mg capsule     ibuprofen 600 mg, oral, Every 8 hours PRN    methocarbamol (ROBAXIN) 500 mg, oral, 3 times daily PRN    omeprazole (PRILOSEC) 20 mg, oral, Do not crush or chew.    tadalafil (Cialis) 5 mg tablet 1 tablet, oral, Daily    triamcinolone (Kenalog) 0.1 % cream       Allergies:   Allergies   Allergen Reactions    Penicillins Unknown       Past Medical & Surgical History:  Past Medical History:   Diagnosis Date    Allergic contact dermatitis 2016    Arthritis     Chronic pain disorder     GERD (gastroesophageal reflux disease)     Impotence 2015    Prostate cancer (CMS/[facility])     URI (upper respiratory infection) 2023      Past Surgical History:   Procedure Laterality Date    HERNIA REPAIR      PROSTATE SURGERY      Prostatectomy    SHOULDER SURGERY Right     Shoulder reconstruction       Family History   Problem Relation Name Age of Onset    Brain cancer Mother       Social History     Socioeconomic History    Marital status: Single     Spouse name: Not on file    Number of children: Not on file    Years of education: Not on file    Highest education level: Not on file   Occupational History    Not on file   Tobacco Use    Smoking status: Former     Years: 45     Types: Cigarettes, Cigars     Quit date:      Years since quittin.0     Passive exposure: Past    Smokeless tobacco: Never   Vaping Use    Vaping Use: Never used   Substance and Sexual Activity    Alcohol use: Not Currently     Alcohol/week: 6.0 standard drinks  "of alcohol     Types: 6 Standard drinks or equivalent per week     Comment: 4 drinks weekly    Drug use: Never    Sexual activity: Not on file   Other Topics Concern    Not on file   Social History Narrative    Not on file     Social Determinants of Health     Financial Resource Strain: Not on file   Food Insecurity: Not on file   Transportation Needs: Not on file   Physical Activity: Not on file   Stress: Not on file   Social Connections: Not on file   Intimate Partner Violence: Not on file   Housing Stability: Not on file       Problems, Past medical history, past surgical history, Medications, allergies, social and family history reviewed and as per the electronic medical record from today's encounter    Review of Systems:  CONST: No fever, chills, fatigue, weight changes  EYES: No loss of vision  ENT: No hearing loss, tinnitus  CV: No chest pain, palpitations  RESP: No dyspnea, shortness of breath, cough  GI: No stool incontinence, nausea, vomiting  : Reports urinary incontinence (has this at baseline since prostate cancer)  MSK: No joint swelling  SKIN: No rash, no hives  NEURO: No headache, dizziness  PSYCH: No anxiety, depression  HEM/LYMPH: No easy bruising or bleeding  All other systems reviewed are negative     Physical Exam:  Vitals: /84   Pulse 70   Resp 18   Ht 1.753 m (5' 9\")   Wt 93.4 kg (206 lb)   BMI 30.42 kg/m²   General: No apparent distress. Alert, appropriate, oriented x 3. Mood generally positive, affect congruent. Speaking in full sentences.   HENT: Normocephalic, atraumatic. Hearing intact.  Eyes: Pupils equal and round  Neck: Supple, trachea midline  Lungs: Symmetric respiratory excursion on visual exam, nonlabored breathing.   Extremities: No cyanosis or edema noted in extremities.  Skin: No rashes, lesions noted.  Neuro: Alert and appropriate. Gait within normal limits. Bulk and tone within normal limits.    Laboratory Data:  The following laboratory data were reviewed during " "this visit:   Lab Results   Component Value Date    WBC 4.4 11/24/2023    RBC 4.31 (L) 11/24/2023    HGB 13.3 (L) 11/24/2023    HCT 39.4 (L) 11/24/2023     11/24/2023      No results found for: \"INR\"  Lab Results   Component Value Date    CREATININE 0.63 11/24/2023       Imaging:  The following imaging impressions were reviewed by me during this visit:    -12/19/23 lumbar spine MRI shows mild loss of height of the superior endplate of L3 with no associated increased STIR. L2-3 mild-to-moderate right neural foraminal narrowing. L3-4 moderate left foraminal narrowing. L4-5 mild circumferential disc bulge with ligamentum flavum hypertrophy and facet arthropathy results in mild-to-moderate spinal canal narrowing, severe left neural foraminal narrowing with contact upon the exiting left-sided nerve root and moderate-to-severe right neural foraminal narrowing.    I also personally reviewed the images from the above studies myself. These images and my interpretation of them contributed to the management and decision making of the patient's medical plan.    ASSESSMENT:  Mr. Alfa Cast is a 63 y.o. male with low back and left leg pain that is consistent with:    1. Lumbar radiculopathy, acute    2. DDD (degenerative disc disease), lumbar          PLAN:    Diagnostics:   -Reviewed his recent lumbar spine MRI which is most significant for severe foraminal narrowing on the left at L4/5.  No additional diagnostics at this time    Physical Therapy and Rehabilitation:     -He was referred to physical therapy but has not started this yet.  Now that his pain has improved would recommend he start this    Psychologically:  -No needs at this time    Medications  - Discontinue Gabapentin due to side effects of confusion and possible worsening of incontinence  - Continue OTC Ibuprofen    Duration  - 1.5 months    Interventions:  -I suspect his pain is primarily due to lumbar radiculopathy in the setting of severe foraminal " narrowing on the left at L4/5.  He underwent a left transforaminal ELOINA at L4/5 with 80% improvement in his pain. Will monitor for duration of pain relief and consider repeating in the future.  - Continue following with Dr. Gonzalez regarding need for foraminotomy        Sincerely,  Andrzej Stahl MD  [facility] - [place]

## 2024-01-31 ENCOUNTER — EVALUATION (OUTPATIENT)
Dept: PHYSICAL THERAPY | Facility: HOSPITAL | Age: 64
End: 2024-01-31
Payer: MEDICARE

## 2024-01-31 DIAGNOSIS — M54.16 LUMBAR RADICULOPATHY: Primary | ICD-10-CM

## 2024-01-31 PROCEDURE — 97162 PT EVAL MOD COMPLEX 30 MIN: CPT | Mod: GP

## 2024-01-31 ASSESSMENT — PAIN SCALES - GENERAL: PAINLEVEL_OUTOF10: 2

## 2024-01-31 ASSESSMENT — PAIN - FUNCTIONAL ASSESSMENT: PAIN_FUNCTIONAL_ASSESSMENT: 0-10

## 2024-01-31 ASSESSMENT — PATIENT HEALTH QUESTIONNAIRE - PHQ9
10. IF YOU CHECKED OFF ANY PROBLEMS, HOW DIFFICULT HAVE THESE PROBLEMS MADE IT FOR YOU TO DO YOUR WORK, TAKE CARE OF THINGS AT HOME, OR GET ALONG WITH OTHER PEOPLE: SOMEWHAT DIFFICULT
2. FEELING DOWN, DEPRESSED OR HOPELESS: SEVERAL DAYS
1. LITTLE INTEREST OR PLEASURE IN DOING THINGS: SEVERAL DAYS
SUM OF ALL RESPONSES TO PHQ9 QUESTIONS 1 AND 2: 2

## 2024-01-31 ASSESSMENT — COLUMBIA-SUICIDE SEVERITY RATING SCALE - C-SSRS
1. IN THE PAST MONTH, HAVE YOU WISHED YOU WERE DEAD OR WISHED YOU COULD GO TO SLEEP AND NOT WAKE UP?: NO
2. HAVE YOU ACTUALLY HAD ANY THOUGHTS OF KILLING YOURSELF?: NO
6. HAVE YOU EVER DONE ANYTHING, STARTED TO DO ANYTHING, OR PREPARED TO DO ANYTHING TO END YOUR LIFE?: NO

## 2024-01-31 ASSESSMENT — ENCOUNTER SYMPTOMS
OCCASIONAL FEELINGS OF UNSTEADINESS: 0
LOSS OF SENSATION IN FEET: 0
DEPRESSION: 1

## 2024-01-31 NOTE — Clinical Note
January 31, 2024    Anjum Yo, PT  [address]  Rehab Services  [facility] [address]    Patient: Alfa Cast   YOB: 1960   Date of Visit: 1/31/2024       Dear Honey Gonzalez MD  [address]  [place] [address]    The attached plan of care is being sent to you because your patient’s medical reimbursement requires that you certify the plan of care. Your signature is required to allow uninterrupted insurance coverage.      You may indicate your approval by signing below and faxing this form back to us at Dept Fax: [phone].    Please call Dept: [phone] with any questions or concerns.    Thank you for this referral,        Anjum Yo PT  [facility]  [address] [facility] [id number] [phone]    Payer: Payor: MEDICARE / Plan: MEDICARE PART A AND B / Product Type: *No Product type* /                                                                         Date:     Dear Anjum Yo PT,     Re: Mr. Alfa Cast, MRN:[id number]    I certify that I have reviewed the attached plan of care and it is medically necessary for Mr. Alfa Cast (1960) who is under my care.          ______________________________________                    _________________  Provider name and credentials                                           Date and time                                                                                           Plan of Care 1/31/24   Effective from: 1/31/2024  Effective to: 3/1/2024    Plan ID: [id number]            Participants as of Finalize on 1/31/2024    Name Type Comments Contact Info    Honey Gonzalez MD Referring Provider  [phone]    Anjum Yo PT Physical Therapist  [phone]       Last Plan Note     Author: Anjum Yo PT Status: Signed Last edited: 1/31/2024  8:45 AM       Physical Therapy    Physical Therapy Evaluation    Patient Name: Alfa Cast  MRN:  [id number]  Today's Date: 1/31/2024  Time Calculation  Start Time: 0845  Stop Time: 0928  Time Calculation (min): 43 min    Assessment  PT Assessment Results: Decreased strength, Pain  Rehab Prognosis: Good  Evaluation/Treatment Tolerance: Patient tolerated treatment well  Assessment Comment: Patient with chronic LBP will benefit from progressive core strengthening program.    Plan  Treatment/Interventions: Education/ Instruction, Neuromuscular re-education, Therapeutic activities, Therapeutic exercises  PT Plan: Skilled PT  PT Frequency: 2 times per week  Duration: 4 wks  Certification Period Start Date: 01/31/24  Certification Period End Date: 03/01/24  Rehab Potential: Good  Plan of Care Agreement: Patient    Current Problem  1. Lumbar radiculopathy  Referral to Physical Therapy    Follow Up In Physical Therapy          Subjective   General:  General  Reason for Referral: Eval and treat 2/2 chronic LBP.  Referred By: Dr. Gonzalez  Past Medical History Relevant to Rehab: Patient with h/o LBP of insidious onset which worsened in November of 2023. He was seen by ortho and is a potential surgical candidate but has been asked to attempt consevative course with pain management and PT initially.  Precautions:  Precautions  STEADI Fall Risk Score (The score of 4 or more indicates an increased risk of falling): 2     Pain:  Pain Assessment: 0-10  Pain Score: 2  Pain Location: Back  Pain Frequency: Several days a week  Effect of Pain on Daily Activities: Unable to sustain walking/sitting activities.     Prior Function Per Pt/Caregiver Report:  Level of [place]: Independent with ADLs and functional transfers, Independent with homemaking with ambulation  Vocational: Retired    Objective   Posture:  Posture Comment: Flat back posture  Range of Motion:  Range of Motion Comments: mod deficit lumbar extension and SB bilaterally. LE's WNL  Strength:  Strength Comments: Abdominal strength poor  Flexibility:   Bilateral HS  tightness  Palpation:   NTTP  Gait:  Gait Comment: No devices, non antalgic gait.  Balance:   WNL  Stairs:   I  Bed Mobility:   I  Transfers:   I  Lumbar Spine    Functional Rating Scale   Oswestry 42%  Observation   Flattened lumbar lordosis  Lumbar Palpation/Joint Mobility Assessment   NTTP  Hip AROM   WNL  Lumbar Myotomes   No sensory loss  Specific Lower Extremity MMT   No focal weakness  Dermatomes   Intact  OP EDUCATION:  Outpatient Education  Individual(s) Educated: Patient  Education Provided: Anatomy, Body Mechanics, Home Exercise Program (DB isometric ex taught.)  Risk and Benefits Discussed with Patient/Caregiver/Other: yes  Patient/Caregiver Demonstrated Understanding: yes  Plan of Care Discussed and Agreed Upon: yes  Patient Response to Education: Patient/Caregiver Verbalized Understanding of Information    Goals:  Active       PT Problem       The patient will demonstrate full understanding and competent performance of their home exercise program to maintain or potentially further improve their presenting condition.        Start:  01/31/24    Expected End:  02/29/24            The patient will record a decrease in self assessed disability on the Oswestry index to <10% as evidence of meaningful decrease in the impact of pain on their ADL activities.        Start:  01/31/24    Expected End:  03/01/24            The patient will ambulate 10 minutes continuously without pain increasing to greater than 1/10 to allow return to required community ambulation tasks.        Start:  01/31/24    Expected End:  03/01/24            The patient will report a reduction in pain to <1/10 to allow them to return to required/desired ADL activities.        Start:  01/31/24    Expected End:  03/01/24               PT Problem       Patient will tolerate prolonged sitting position >2 hours to allow return to motorcycle trips.       Start:  01/31/24    Expected End:  03/01/24                                Current Participants  as of 1/31/2024    Name Type Comments Contact Info    Honey Gonzalez MD Referring Provider  [phone]    Signature pending    Anjum Yo PT Physical Therapist  [phone]    Signature pending

## 2024-01-31 NOTE — PROGRESS NOTES
Physical Therapy    Physical Therapy Evaluation    Patient Name: Alfa Cast  MRN: [id number]  Today's Date: 1/31/2024  Time Calculation  Start Time: 0845  Stop Time: 0928  Time Calculation (min): 43 min    Assessment  PT Assessment Results: Decreased strength, Pain  Rehab Prognosis: Good  Evaluation/Treatment Tolerance: Patient tolerated treatment well  Assessment Comment: Patient with chronic LBP will benefit from progressive core strengthening program.    Plan  Treatment/Interventions: Education/ Instruction, Neuromuscular re-education, Therapeutic activities, Therapeutic exercises  PT Plan: Skilled PT  PT Frequency: 2 times per week  Duration: 4 wks  Certification Period Start Date: 01/31/24  Certification Period End Date: 03/01/24  Rehab Potential: Good  Plan of Care Agreement: Patient    Current Problem  1. Lumbar radiculopathy  Referral to Physical Therapy    Follow Up In Physical Therapy          Subjective   General:  General  Reason for Referral: Eval and treat 2/2 chronic LBP.  Referred By: Dr. Gonzalez  Past Medical History Relevant to Rehab: Patient with h/o LBP of insidious onset which worsened in November of 2023. He was seen by ortho and is a potential surgical candidate but has been asked to attempt consevative course with pain management and PT initially.  Precautions:  Precautions  STEADI Fall Risk Score (The score of 4 or more indicates an increased risk of falling): 2     Pain:  Pain Assessment: 0-10  Pain Score: 2  Pain Location: Back  Pain Frequency: Several days a week  Effect of Pain on Daily Activities: Unable to sustain walking/sitting activities.     Prior Function Per Pt/Caregiver Report:  Level of [place]: Independent with ADLs and functional transfers, Independent with homemaking with ambulation  Vocational: Retired    Objective   Posture:  Posture Comment: Flat back posture  Range of Motion:  Range of Motion Comments: mod deficit lumbar extension and SB bilaterally. LE's  WNL  Strength:  Strength Comments: Abdominal strength poor  Flexibility:   Bilateral HS tightness  Palpation:   NTTP  Gait:  Gait Comment: No devices, non antalgic gait.  Balance:   WNL  Stairs:   I  Bed Mobility:   I  Transfers:   I  Lumbar Spine    Functional Rating Scale   Oswestry 42%  Observation   Flattened lumbar lordosis  Lumbar Palpation/Joint Mobility Assessment   NTTP  Hip AROM   WNL  Lumbar Myotomes   No sensory loss  Specific Lower Extremity MMT   No focal weakness  Dermatomes   Intact  OP EDUCATION:  Outpatient Education  Individual(s) Educated: Patient  Education Provided: Anatomy, Body Mechanics, Home Exercise Program (DB isometric ex taught.)  Risk and Benefits Discussed with Patient/Caregiver/Other: yes  Patient/Caregiver Demonstrated Understanding: yes  Plan of Care Discussed and Agreed Upon: yes  Patient Response to Education: Patient/Caregiver Verbalized Understanding of Information    Goals:  Active       PT Problem       The patient will demonstrate full understanding and competent performance of their home exercise program to maintain or potentially further improve their presenting condition.        Start:  01/31/24    Expected End:  02/29/24            The patient will record a decrease in self assessed disability on the Oswestry index to <10% as evidence of meaningful decrease in the impact of pain on their ADL activities.        Start:  01/31/24    Expected End:  03/01/24            The patient will ambulate 10 minutes continuously without pain increasing to greater than 1/10 to allow return to required community ambulation tasks.        Start:  01/31/24    Expected End:  03/01/24            The patient will report a reduction in pain to <1/10 to allow them to return to required/desired ADL activities.        Start:  01/31/24    Expected End:  03/01/24               PT Problem       Patient will tolerate prolonged sitting position >2 hours to allow return to motorcycle trips.       Start:   01/31/24    Expected End:  03/01/24

## 2024-02-02 ENCOUNTER — TREATMENT (OUTPATIENT)
Dept: PHYSICAL THERAPY | Facility: HOSPITAL | Age: 64
End: 2024-02-02
Payer: MEDICARE

## 2024-02-02 DIAGNOSIS — M54.16 LUMBAR RADICULOPATHY: ICD-10-CM

## 2024-02-02 PROCEDURE — 97110 THERAPEUTIC EXERCISES: CPT | Mod: GP

## 2024-02-02 ASSESSMENT — PAIN - FUNCTIONAL ASSESSMENT: PAIN_FUNCTIONAL_ASSESSMENT: 0-10

## 2024-02-02 ASSESSMENT — PAIN SCALES - GENERAL: PAINLEVEL_OUTOF10: 1

## 2024-02-02 NOTE — PROGRESS NOTES
Physical Therapy    Physical Therapy Treatment    Patient Name: Alfa Cast  MRN: [id number]  Today's Date: 2/2/2024  Time Calculation  Start Time: 0830  Stop Time: 0915  Time Calculation (min): 45 min      Assessment:  PT Assessment  Assessment Comment: Progressing toward goals  Plan:  OP PT Plan  PT Plan: Skilled PT (2/8)    Current Problem  1. Lumbar radiculopathy  Follow Up In Physical Therapy          General  PT  Visit  PT Received On: 02/02/24  General  General Comment: Patient consistent with HEP and looks forward to expanding routine.    Subjective    Pain  Pain Assessment  Pain Assessment: 0-10  Pain Score: 1  Pain Location: Back    Objective     Activity Tolerance:  Activity Tolerance  Activity Tolerance Comments: Tolerated expanded program without pain increase and with good understanding    Treatments:  Therapeutic Exercise  Therapeutic Exercise Performed: Yes  Therapeutic Exercise Activity 1: TM x 10  Therapeutic Exercise Activity 2: standing quad stretch  Therapeutic Exercise Activity 3: half kneeling anterior hip stretch  Therapeutic Exercise Activity 4: foote hip extension  Therapeutic Exercise Activity 5: foote back extension  Therapeutic Exercise Activity 6: CP flexion  Therapeutic Exercise Activity 7: DEVORA  Therapeutic Exercise Activity 8: cat/camel AROM  Therapeutic Exercise Activity 9: DB isometrics    OP EDUCATION:   Refining HEP technique    Goals:  Active       PT Problem       The patient will demonstrate full understanding and competent performance of their home exercise program to maintain or potentially further improve their presenting condition.        Start:  01/31/24    Expected End:  02/29/24            The patient will record a decrease in self assessed disability on the Oswestry index to <10% as evidence of meaningful decrease in the impact of pain on their ADL activities.        Start:  01/31/24    Expected End:  03/01/24            The patient will ambulate 10 minutes  continuously without pain increasing to greater than 1/10 to allow return to required community ambulation tasks.        Start:  01/31/24    Expected End:  03/01/24            The patient will report a reduction in pain to <1/10 to allow them to return to required/desired ADL activities.        Start:  01/31/24    Expected End:  03/01/24               PT Problem       Patient will tolerate prolonged sitting position >2 hours to allow return to motorcycle trips.       Start:  01/31/24    Expected End:  03/01/24

## 2024-02-05 ENCOUNTER — TREATMENT (OUTPATIENT)
Dept: PHYSICAL THERAPY | Facility: HOSPITAL | Age: 64
End: 2024-02-05
Payer: MEDICARE

## 2024-02-05 DIAGNOSIS — M54.16 LUMBAR RADICULOPATHY: ICD-10-CM

## 2024-02-05 PROCEDURE — 97110 THERAPEUTIC EXERCISES: CPT | Mod: GP,CQ

## 2024-02-05 ASSESSMENT — PAIN SCALES - GENERAL: PAINLEVEL_OUTOF10: 1

## 2024-02-05 ASSESSMENT — PAIN - FUNCTIONAL ASSESSMENT: PAIN_FUNCTIONAL_ASSESSMENT: 0-10

## 2024-02-05 NOTE — PROGRESS NOTES
Physical Therapy    Physical Therapy Treatment    Patient Name: Alfa Cast  MRN: [id number]  Today's Date: 2/5/2024  Time Calculation  Start Time: 0930  Stop Time: 1010  Time Calculation (min): 40 min      Assessment:  PT Assessment  Assessment Comment: Patient was able to complete all interventions well with no complaints other than fatigue  Plan:  OP PT Plan  PT Plan: Skilled PT (3/8 tx)  Continue per POC and as patient tolerated     Current Problem  1. Lumbar radiculopathy  Follow Up In Physical Therapy          General  PT  Visit  PT Received On: 02/05/24  Response to Previous Treatment: Patient reporting fatigue but able to participate., Compliant with home exercise program  General  General Comment: Patient states he is keeping up walking and getting into a routine.    Subjective    Precautions  Precautions  Medical Precautions: No known precautions/limitation    Pain  Pain Assessment  Pain Assessment: 0-10  Pain Score: 1  Pain Type: Chronic pain  Pain Location: Back  Pain Orientation: Lower    Treatments:  Therapeutic Exercise  Therapeutic Exercise Performed: Yes    Treadmill x10 min 1.2 mph, foote back ext 2x10 and back ext 3x10, pipo pose flexion stretch 5x15 sec,  DEVOAR 5x15 sec, cat/cow AROM 2x15, DB isometrics 5 sec 2x10 BUE and BLE elevated, standing quad stretch off chair 4x20 sec    Goals:  Active       PT Problem       The patient will demonstrate full understanding and competent performance of their home exercise program to maintain or potentially further improve their presenting condition.  (Progressing)       Start:  01/31/24    Expected End:  02/29/24            The patient will record a decrease in self assessed disability on the Oswestry index to <10% as evidence of meaningful decrease in the impact of pain on their ADL activities.  (Progressing)       Start:  01/31/24    Expected End:  03/01/24            The patient will ambulate 10 minutes continuously without pain increasing to greater  than 1/10 to allow return to required community ambulation tasks.  (Progressing)       Start:  01/31/24    Expected End:  03/01/24            The patient will report a reduction in pain to <1/10 to allow them to return to required/desired ADL activities.  (Progressing)       Start:  01/31/24    Expected End:  03/01/24               PT Problem       Patient will tolerate prolonged sitting position >2 hours to allow return to motorcycle trips. (Progressing)       Start:  01/31/24    Expected End:  03/01/24

## 2024-02-07 ENCOUNTER — LAB (OUTPATIENT)
Dept: LAB | Facility: LAB | Age: 64
End: 2024-02-07
Payer: MEDICARE

## 2024-02-07 DIAGNOSIS — E78.2 MIXED HYPERLIPIDEMIA: ICD-10-CM

## 2024-02-07 LAB
ALBUMIN SERPL BCP-MCNC: 4.1 G/DL (ref 3.4–5)
ALP SERPL-CCNC: 81 U/L (ref 33–136)
ALT SERPL W P-5'-P-CCNC: 20 U/L (ref 10–52)
ANION GAP SERPL CALC-SCNC: 11 MMOL/L (ref 10–20)
AST SERPL W P-5'-P-CCNC: 20 U/L (ref 9–39)
BASOPHILS # BLD AUTO: 0.14 X10*3/UL (ref 0–0.1)
BASOPHILS NFR BLD AUTO: 2.5 %
BILIRUB SERPL-MCNC: 0.6 MG/DL (ref 0–1.2)
BUN SERPL-MCNC: 16 MG/DL (ref 6–23)
CALCIUM SERPL-MCNC: 9 MG/DL (ref 8.6–10.3)
CHLORIDE SERPL-SCNC: 103 MMOL/L (ref 98–107)
CHOLEST SERPL-MCNC: 185 MG/DL (ref 0–199)
CHOLESTEROL/HDL RATIO: 3
CO2 SERPL-SCNC: 29 MMOL/L (ref 21–32)
CREAT SERPL-MCNC: 0.74 MG/DL (ref 0.5–1.3)
EGFRCR SERPLBLD CKD-EPI 2021: >90 ML/MIN/1.73M*2
EOSINOPHIL # BLD AUTO: 0.33 X10*3/UL (ref 0–0.7)
EOSINOPHIL NFR BLD AUTO: 5.9 %
ERYTHROCYTE [DISTWIDTH] IN BLOOD BY AUTOMATED COUNT: 13.7 % (ref 11.5–14.5)
GLUCOSE SERPL-MCNC: 81 MG/DL (ref 74–99)
HCT VFR BLD AUTO: 40.4 % (ref 41–52)
HDLC SERPL-MCNC: 60.9 MG/DL
HGB BLD-MCNC: 13.3 G/DL (ref 13.5–17.5)
IMM GRANULOCYTES # BLD AUTO: 0.01 X10*3/UL (ref 0–0.7)
IMM GRANULOCYTES NFR BLD AUTO: 0.2 % (ref 0–0.9)
LDLC SERPL CALC-MCNC: 106 MG/DL
LYMPHOCYTES # BLD AUTO: 1.2 X10*3/UL (ref 1.2–4.8)
LYMPHOCYTES NFR BLD AUTO: 21.6 %
MCH RBC QN AUTO: 30.7 PG (ref 26–34)
MCHC RBC AUTO-ENTMCNC: 32.9 G/DL (ref 32–36)
MCV RBC AUTO: 93 FL (ref 80–100)
MONOCYTES # BLD AUTO: 0.66 X10*3/UL (ref 0.1–1)
MONOCYTES NFR BLD AUTO: 11.9 %
NEUTROPHILS # BLD AUTO: 3.22 X10*3/UL (ref 1.2–7.7)
NEUTROPHILS NFR BLD AUTO: 57.9 %
NON HDL CHOLESTEROL: 124 MG/DL (ref 0–149)
NRBC BLD-RTO: 0 /100 WBCS (ref 0–0)
PLATELET # BLD AUTO: 254 X10*3/UL (ref 150–450)
POTASSIUM SERPL-SCNC: 4.3 MMOL/L (ref 3.5–5.3)
PROT SERPL-MCNC: 6.6 G/DL (ref 6.4–8.2)
RBC # BLD AUTO: 4.33 X10*6/UL (ref 4.5–5.9)
SODIUM SERPL-SCNC: 139 MMOL/L (ref 136–145)
TRIGL SERPL-MCNC: 90 MG/DL (ref 0–149)
VLDL: 18 MG/DL (ref 0–40)
WBC # BLD AUTO: 5.6 X10*3/UL (ref 4.4–11.3)

## 2024-02-07 PROCEDURE — 80061 LIPID PANEL: CPT

## 2024-02-07 PROCEDURE — 85025 COMPLETE CBC W/AUTO DIFF WBC: CPT

## 2024-02-07 PROCEDURE — 36415 COLL VENOUS BLD VENIPUNCTURE: CPT

## 2024-02-07 PROCEDURE — 80053 COMPREHEN METABOLIC PANEL: CPT

## 2024-02-08 ENCOUNTER — TREATMENT (OUTPATIENT)
Dept: PHYSICAL THERAPY | Facility: HOSPITAL | Age: 64
End: 2024-02-08
Payer: MEDICARE

## 2024-02-08 DIAGNOSIS — M54.16 LUMBAR RADICULOPATHY: ICD-10-CM

## 2024-02-08 PROCEDURE — 97110 THERAPEUTIC EXERCISES: CPT | Mod: GP,CQ

## 2024-02-08 ASSESSMENT — PAIN - FUNCTIONAL ASSESSMENT: PAIN_FUNCTIONAL_ASSESSMENT: 0-10

## 2024-02-08 ASSESSMENT — PAIN SCALES - GENERAL: PAINLEVEL_OUTOF10: 1

## 2024-02-08 NOTE — PROGRESS NOTES
Physical Therapy    Physical Therapy Treatment    Patient Name: Alfa aCst  MRN: [id number]  Today's Date: 2/8/2024  Time Calculation  Start Time: 1033  Stop Time: 1112  Time Calculation (min): 39 min      Assessment:  PT Assessment  Assessment Comment: Cuing throughout on correct tecnique to ensure eliciting accurate muscle response.Tolerated all exercises fairly well  Plan:  OP PT Plan  PT Plan: Skilled PT (4/8 tx)  Continue per POC and as patient tolerated   Current Problem  1. Lumbar radiculopathy  Follow Up In Physical Therapy          General  PT  Visit  PT Received On: 02/08/24  Response to Previous Treatment: Patient reporting fatigue but able to participate., Compliant with home exercise program  General  General Comment: Patient states he is doing alright today (patient a little late today)    Subjective    Precautions  Precautions  Medical Precautions: No known precautions/limitation    Pain  Pain Assessment  Pain Assessment: 0-10  Pain Score: 1  Pain Type: Chronic pain  Pain Location: Back  Pain Orientation: Lower      Treatments:  Therapeutic Exercise  Therapeutic Exercise Performed: Yes    Treadmill x10 min 1.5 mph, foote back ext 3x10 and hip ext 3x10, pipo pose flexion stretch 5x15 sec, DEVORA 5x15 sec, cat/cow AROM 2x15, DB isometrics 5 sec 2x10 BUE and BLE elevated, standing quad stretch off chair 4x20 sec, hip abd machine 55# 3x10, STS no UE support 2x10     Goals:  Active       PT Problem       The patient will demonstrate full understanding and competent performance of their home exercise program to maintain or potentially further improve their presenting condition.  (Progressing)       Start:  01/31/24    Expected End:  02/29/24            The patient will record a decrease in self assessed disability on the Oswestry index to <10% as evidence of meaningful decrease in the impact of pain on their ADL activities.  (Progressing)       Start:  01/31/24    Expected End:  03/01/24            The  patient will ambulate 10 minutes continuously without pain increasing to greater than 1/10 to allow return to required community ambulation tasks.  (Progressing)       Start:  01/31/24    Expected End:  03/01/24            The patient will report a reduction in pain to <1/10 to allow them to return to required/desired ADL activities.  (Progressing)       Start:  01/31/24    Expected End:  03/01/24               PT Problem       Patient will tolerate prolonged sitting position >2 hours to allow return to motorcycle trips. (Progressing)       Start:  01/31/24    Expected End:  03/01/24

## 2024-02-12 ENCOUNTER — TREATMENT (OUTPATIENT)
Dept: PHYSICAL THERAPY | Facility: HOSPITAL | Age: 64
End: 2024-02-12
Payer: MEDICARE

## 2024-02-12 DIAGNOSIS — M54.16 LUMBAR RADICULOPATHY: ICD-10-CM

## 2024-02-12 PROCEDURE — 97110 THERAPEUTIC EXERCISES: CPT | Mod: GP,CQ

## 2024-02-12 ASSESSMENT — PAIN SCALES - GENERAL: PAINLEVEL_OUTOF10: 1

## 2024-02-12 ASSESSMENT — PAIN - FUNCTIONAL ASSESSMENT: PAIN_FUNCTIONAL_ASSESSMENT: 0-10

## 2024-02-12 NOTE — PROGRESS NOTES
Physical Therapy    Physical Therapy Treatment    Patient Name: Alfa Cast  MRN: [id number]  Today's Date: 2/12/2024  Time Calculation  Start Time: 0843  Stop Time: 0925  Time Calculation (min): 42 min      Assessment:  PT Assessment  Assessment Comment: Continues to tolerate all mobility/strengthening well following cues for techinque  Plan:  OP PT Plan  PT Plan: Skilled PT (5/8 tx)  Continue per POC and as patient tolerated   Current Problem  1. Lumbar radiculopathy  Follow Up In Physical Therapy          General  PT  Visit  PT Received On: 02/12/24  Response to Previous Treatment: Patient reporting fatigue but able to participate.  General  General Comment: Patient states he had some insomnia last night, but other than that doing well today. Noted feeling better after last session and was able to do more yard work    Subjective    Precautions  Precautions  Medical Precautions: No known precautions/limitation    Pain  Pain Assessment  Pain Assessment: 0-10  Pain Score: 1  Pain Type: Chronic pain  Pain Location: Back  Pain Orientation: Lower, Right      Treatments:  Therapeutic Exercise  Therapeutic Exercise Performed: Yes    Treadmill x10 min 1.5 mph, foote back ext 3x10 and hip ext 3x10, pipo pose flexion stretch 5x15 sec, DEVORA 5x15 sec, cat/cow AROM 2x15, DB isometrics 10 sec 2x10 BUE and BLE elevated, standing quad stretch off chair 4x20 sec, hip abd machine 55# 3x10    Goals:  Active       PT Problem       The patient will demonstrate full understanding and competent performance of their home exercise program to maintain or potentially further improve their presenting condition.  (Progressing)       Start:  01/31/24    Expected End:  02/29/24            The patient will record a decrease in self assessed disability on the Oswestry index to <10% as evidence of meaningful decrease in the impact of pain on their ADL activities.  (Progressing)       Start:  01/31/24    Expected End:  03/01/24            The  patient will ambulate 10 minutes continuously without pain increasing to greater than 1/10 to allow return to required community ambulation tasks.  (Progressing)       Start:  01/31/24    Expected End:  03/01/24            The patient will report a reduction in pain to <1/10 to allow them to return to required/desired ADL activities.  (Progressing)       Start:  01/31/24    Expected End:  03/01/24               PT Problem       Patient will tolerate prolonged sitting position >2 hours to allow return to motorcycle trips. (Progressing)       Start:  01/31/24    Expected End:  03/01/24

## 2024-02-15 ENCOUNTER — TREATMENT (OUTPATIENT)
Dept: PHYSICAL THERAPY | Facility: HOSPITAL | Age: 64
End: 2024-02-15
Payer: MEDICARE

## 2024-02-15 DIAGNOSIS — M54.16 LUMBAR RADICULOPATHY: ICD-10-CM

## 2024-02-15 PROCEDURE — 97110 THERAPEUTIC EXERCISES: CPT | Mod: GP

## 2024-02-15 ASSESSMENT — PAIN - FUNCTIONAL ASSESSMENT: PAIN_FUNCTIONAL_ASSESSMENT: 0-10

## 2024-02-15 ASSESSMENT — PAIN SCALES - GENERAL: PAINLEVEL_OUTOF10: 2

## 2024-02-15 NOTE — PROGRESS NOTES
Physical Therapy    Physical Therapy Treatment    Patient Name: Alfa Cast  MRN: [id number]  Today's Date: 2/15/2024  Time Calculation  Start Time: 1016  Stop Time: 1105  Time Calculation (min): 49 min      Assessment:  PT Assessment  Assessment Comment: Building program slowly without pain increases.    Plan:  OP PT Plan  PT Plan: Skilled PT (6/8)    Current Problem  1. Lumbar radiculopathy  Follow Up In Physical Therapy          General  PT  Visit  PT Received On: 02/15/24  Response to Previous Treatment: Patient with no complaints from previous session.  General  General Comment: Patient had quite a bit of stiffness after driving in car 30 minutes yesterday.    Subjective    Pain  Pain Assessment  Pain Assessment: 0-10  Pain Score: 2  Pain Type: Chronic pain  Pain Location: Back  Pain Orientation: Lower, Left    Objective   Posture   Tends to ambulate in mild bilateral hip flexion if not cued.    Activity Tolerance:   Improving steadily.    Treatments:  Therapeutic Exercise  Therapeutic Exercise Performed: YesTreadmill x10 min 1.5 mph, foote back ext 3x10 and hip ext 3x10, pipo pose flexion stretch 5x15 sec, DEVORA 5x15 sec, cat/cow AROM 2x15, DB isometrics 10 sec 2x10 BUE and BLE elevated, standing quad stretch off chair 4x20 sec, hip abd machine 55# 3x10     Goals:  Active       PT Problem       The patient will demonstrate full understanding and competent performance of their home exercise program to maintain or potentially further improve their presenting condition.  (Progressing)       Start:  01/31/24    Expected End:  02/29/24            The patient will record a decrease in self assessed disability on the Oswestry index to <10% as evidence of meaningful decrease in the impact of pain on their ADL activities.  (Progressing)       Start:  01/31/24    Expected End:  03/01/24            The patient will ambulate 10 minutes continuously without pain increasing to greater than 1/10 to allow return to required  community ambulation tasks.  (Progressing)       Start:  01/31/24    Expected End:  03/01/24            The patient will report a reduction in pain to <1/10 to allow them to return to required/desired ADL activities.  (Progressing)       Start:  01/31/24    Expected End:  03/01/24               PT Problem       Patient will tolerate prolonged sitting position >2 hours to allow return to motorcycle trips. (Progressing)       Start:  01/31/24    Expected End:  03/01/24

## 2024-02-19 ENCOUNTER — TREATMENT (OUTPATIENT)
Dept: PHYSICAL THERAPY | Facility: HOSPITAL | Age: 64
End: 2024-02-19
Payer: MEDICARE

## 2024-02-19 DIAGNOSIS — M54.16 LUMBAR RADICULOPATHY: ICD-10-CM

## 2024-02-19 PROCEDURE — 97110 THERAPEUTIC EXERCISES: CPT | Mod: GP

## 2024-02-19 ASSESSMENT — PAIN - FUNCTIONAL ASSESSMENT: PAIN_FUNCTIONAL_ASSESSMENT: 0-10

## 2024-02-19 ASSESSMENT — PAIN SCALES - GENERAL: PAINLEVEL_OUTOF10: 3

## 2024-02-19 NOTE — PROGRESS NOTES
Physical Therapy    Physical Therapy Treatment    Patient Name: Alfa Cast  MRN: [id number]  Today's Date: 2/19/2024  Time Calculation  Start Time: 0930  Stop Time: 1010  Time Calculation (min): 40 min      Assessment:  PT Assessment  Assessment Comment: Patient getting through core/lumbar program with increasing ease.    Plan:  OP PT Plan  PT Plan: Skilled PT (7/8)    Current Problem  1. Lumbar radiculopathy  Follow Up In Physical Therapy        General  PT  Visit  PT Received On: 02/19/24  General  General Comment: Stiffness remains primary complaint especially when trying to acheive erect standing posture.    Subjective    Pain  Pain Assessment  Pain Assessment: 0-10  Pain Score: 3  Pain Location: Back  Pain Orientation: Lower, Left    Objective   Posture   Tendency toward hip flexion in stance 2/2 tightness of anterior hip, thigh musculature.    Activity Tolerance:  Activity Tolerance  Activity Tolerance Comments: Patient learning techniques to manage his LBP which do not elevate pain level.    Treatments:  Therapeutic Exercise  Therapeutic Exercise Performed: Yes  Treadmill x10 min 1.5 mph, foote back ext 3x10 and hip ext 3x10, pipo pose flexion stretch 5x15 sec, DEVORA 5x15 sec, cat/cow AROM 2x15, DB isometrics 10 sec 2x10 BUE and BLE elevated, standing quad stretch off chair 4x20 sec, hip abd machine 55# 2x20/10     OP EDUCATION:   Offering alternatives for posterior chain activation at home.    Goals:  Active       PT Problem       The patient will demonstrate full understanding and competent performance of their home exercise program to maintain or potentially further improve their presenting condition.  (Progressing)       Start:  01/31/24    Expected End:  02/29/24            The patient will record a decrease in self assessed disability on the Oswestry index to <10% as evidence of meaningful decrease in the impact of pain on their ADL activities.  (Progressing)       Start:  01/31/24    Expected End:   03/01/24            The patient will ambulate 10 minutes continuously without pain increasing to greater than 1/10 to allow return to required community ambulation tasks.  (Progressing)       Start:  01/31/24    Expected End:  03/01/24            The patient will report a reduction in pain to <1/10 to allow them to return to required/desired ADL activities.  (Progressing)       Start:  01/31/24    Expected End:  03/01/24               PT Problem       Patient will tolerate prolonged sitting position >2 hours to allow return to motorcycle trips. (Progressing)       Start:  01/31/24    Expected End:  03/01/24

## 2024-02-22 ENCOUNTER — TREATMENT (OUTPATIENT)
Dept: PHYSICAL THERAPY | Facility: HOSPITAL | Age: 64
End: 2024-02-22
Payer: MEDICARE

## 2024-02-22 ENCOUNTER — DOCUMENTATION (OUTPATIENT)
Dept: PHYSICAL THERAPY | Facility: HOSPITAL | Age: 64
End: 2024-02-22

## 2024-02-22 DIAGNOSIS — M54.16 LUMBAR RADICULOPATHY: ICD-10-CM

## 2024-02-22 PROCEDURE — 97110 THERAPEUTIC EXERCISES: CPT | Mod: GP

## 2024-02-22 ASSESSMENT — PAIN - FUNCTIONAL ASSESSMENT: PAIN_FUNCTIONAL_ASSESSMENT: 0-10

## 2024-02-22 ASSESSMENT — PAIN SCALES - GENERAL: PAINLEVEL_OUTOF10: 0 - NO PAIN

## 2024-02-22 NOTE — PROGRESS NOTES
Physical Therapy    Physical Therapy Treatment    Patient Name: Alfa Cast  MRN: [id number]  Today's Date: 2/22/2024  Time Calculation  Start Time: 1015  Stop Time: 1055  Time Calculation (min): 40 min      Assessment:  PT Assessment  Assessment Comment: The patient has improved significantly and feels able to manage his condition independently.  Plan:  OP PT Plan  PT Plan: No Additional PT interventions required at this time  Plan of Care Agreement: Patient    Current Problem  1. Lumbar radiculopathy  Follow Up In Physical Therapy          General  PT  Visit  PT Received On: 02/22/24  General  General Comment: Doing well. Plans to join gym and begin bike riding in spring.    Subjective    Pain  Pain Assessment  Pain Assessment: 0-10  Pain Score: 0 - No pain    Objective   Posture   Demonstrates heightened awareness of lumbar position.    Activity Tolerance:  Activity Tolerance  Activity Tolerance Comments: Excellent improvement from time of evaluation.    Outcome Measures:   Oswestry 22%    Treatments:  Therapeutic Exercise  Therapeutic Exercise Performed: Yes  Treadmill x10 min 2mph, foote back ext 3x10 and hip ext 3x10, pipo pose flexion stretch 5x15 sec, DEVORA 5x15 sec, cat/cow AROM 2x15, DB isometrics 10 sec 2x10 BUE and BLE elevated, reassessed all goals set on IE.    OP EDUCATION:       Goals:  Resolved       PT Problem       The patient will demonstrate full understanding and competent performance of their home exercise program to maintain or potentially further improve their presenting condition.  (Met)       Start:  01/31/24    Expected End:  02/29/24    Resolved:  02/22/24         The patient will record a decrease in self assessed disability on the Oswestry index to <10% as evidence of meaningful decrease in the impact of pain on their ADL activities.  (Adequate for Discharge)       Start:  01/31/24    Expected End:  03/01/24    Resolved:  02/22/24         The patient will ambulate 10 minutes  continuously without pain increasing to greater than 1/10 to allow return to required community ambulation tasks.  (Met)       Start:  01/31/24    Expected End:  03/01/24    Resolved:  02/22/24         The patient will report a reduction in pain to <1/10 to allow them to return to required/desired ADL activities.  (Met)       Start:  01/31/24    Expected End:  03/01/24    Resolved:  02/22/24            PT Problem       Patient will tolerate prolonged sitting position >2 hours to allow return to motorcycle trips. (Met)       Start:  01/31/24    Expected End:  03/01/24    Resolved:  02/22/24

## 2024-02-22 NOTE — PROGRESS NOTES
Anesthesia Evaluation     . Pt has had prior anesthetic.     No history of anesthetic complications          ROS/MED HX    ENT/Pulmonary:     (+)sleep apnea, tobacco use, Past use uses CPAP , . .    Neurologic:      (-) seizures and CVA   Cardiovascular:     (+) Dyslipidemia, hypertension----. Taking blood thinners : . . . :. dysrhythmias a-fib, .       METS/Exercise Tolerance:     Hematologic:         Musculoskeletal:         GI/Hepatic:        (-) GERD and liver disease   Renal/Genitourinary:      (-) renal disease   Endo:     (+) Obesity, .   (-) Type II DM and thyroid disease   Psychiatric:         Infectious Disease:         Malignancy:         Other:                     Physical Exam  Normal systems: cardiovascular, pulmonary and dental    Airway   Mallampati: III  TM distance: >3 FB  Neck ROM: full    Dental     Cardiovascular       Pulmonary                     Anesthesia Plan      History & Physical Review  History and physical reviewed and following examination; no interval change.    ASA Status:  3 .    NPO Status:  > 8 hours    Plan for MAC Reason for MAC:  Deep or markedly invasive procedure (G8)  PONV prophylaxis:  Ondansetron (or other 5HT-3)  IV metoprolol in pre op      Postoperative Care  Postoperative pain management:  IV analgesics.      Consents  Anesthetic plan, risks, benefits and alternatives discussed with:  Patient..                          .   Physical Therapy    Discharge Summary    Name: Alfa Cast  MRN: [id number]  : 1960  Date: 24    Discharge Summary: PT    Discharge Information: Date of discharge , Date of last visit , Referred by Dr. Vital, and Referred for LBP      Rehab Discharge Reason: Achieved all and/or the most significant goals(s)

## 2024-02-28 ENCOUNTER — HOSPITAL ENCOUNTER (OUTPATIENT)
Dept: CARDIOLOGY | Facility: HOSPITAL | Age: 64
Discharge: HOME | End: 2024-02-28
Payer: MEDICARE

## 2024-02-28 DIAGNOSIS — I25.10 CORONARY ARTERY CALCIFICATION SEEN ON CT SCAN: ICD-10-CM

## 2024-02-28 PROCEDURE — 93016 CV STRESS TEST SUPVJ ONLY: CPT | Performed by: STUDENT IN AN ORGANIZED HEALTH CARE EDUCATION/TRAINING PROGRAM

## 2024-02-28 PROCEDURE — 93017 CV STRESS TEST TRACING ONLY: CPT

## 2024-02-28 PROCEDURE — 93018 CV STRESS TEST I&R ONLY: CPT | Performed by: STUDENT IN AN ORGANIZED HEALTH CARE EDUCATION/TRAINING PROGRAM

## 2024-03-05 DIAGNOSIS — R94.39 ABNORMAL STRESS TEST: Primary | ICD-10-CM

## 2024-03-06 ENCOUNTER — OFFICE VISIT (OUTPATIENT)
Dept: CARDIOLOGY | Facility: CLINIC | Age: 64
End: 2024-03-06
Payer: MEDICARE

## 2024-03-06 VITALS
OXYGEN SATURATION: 94 % | DIASTOLIC BLOOD PRESSURE: 63 MMHG | BODY MASS INDEX: 29.06 KG/M2 | SYSTOLIC BLOOD PRESSURE: 110 MMHG | HEART RATE: 55 BPM | WEIGHT: 203 LBS | HEIGHT: 70 IN

## 2024-03-06 DIAGNOSIS — I25.10 CORONARY ARTERIOSCLEROSIS: ICD-10-CM

## 2024-03-06 DIAGNOSIS — I25.10 CORONARY ARTERIOSCLEROSIS: Primary | ICD-10-CM

## 2024-03-06 DIAGNOSIS — R94.39 ABNORMAL STRESS TEST: Primary | ICD-10-CM

## 2024-03-06 PROCEDURE — 1125F AMNT PAIN NOTED PAIN PRSNT: CPT | Performed by: STUDENT IN AN ORGANIZED HEALTH CARE EDUCATION/TRAINING PROGRAM

## 2024-03-06 PROCEDURE — 1159F MED LIST DOCD IN RCRD: CPT | Performed by: STUDENT IN AN ORGANIZED HEALTH CARE EDUCATION/TRAINING PROGRAM

## 2024-03-06 PROCEDURE — 99204 OFFICE O/P NEW MOD 45 MIN: CPT | Performed by: STUDENT IN AN ORGANIZED HEALTH CARE EDUCATION/TRAINING PROGRAM

## 2024-03-06 PROCEDURE — 1036F TOBACCO NON-USER: CPT | Performed by: STUDENT IN AN ORGANIZED HEALTH CARE EDUCATION/TRAINING PROGRAM

## 2024-03-06 PROCEDURE — 1160F RVW MEDS BY RX/DR IN RCRD: CPT | Performed by: STUDENT IN AN ORGANIZED HEALTH CARE EDUCATION/TRAINING PROGRAM

## 2024-03-06 RX ORDER — ATORVASTATIN CALCIUM 80 MG/1
80 TABLET, FILM COATED ORAL DAILY
Qty: 30 TABLET | Refills: 11 | Status: SHIPPED | OUTPATIENT
Start: 2024-03-06 | End: 2025-03-06

## 2024-03-06 NOTE — PROGRESS NOTES
Primary Care Physician: Veda Vital DO   Date of Visit: 2024  2:40 PM EST  Type of Visit: new      Chief Complaint:  Chief Complaint   Patient presents with    New Patient Visit     New patient for a abnormal stress test        HPI  Alfa Cast 63 y.o. male with hx of HLD, CAC, smoker referred for abnormal treadmill    Recently he has wheezing at night shortly after lying down on the bed, he tries to ignore it and goes away    Sometimes he gets intermittent sob with exertion  He used to do marathons long time ago     He gets twinges in his chest, randomly, not often and not bothersome     On the treadmill he did 10.1 METS but did not have chest pain or sob    He quit smoking ~ 1 year ago   No bleeding issues, but he bruises easily     No fhx of cardiac issues      Review of Systems   Review of Systems   12 points review of systems are negative expect for the above    Social History:  Social History     Socioeconomic History    Marital status: Single     Spouse name: Not on file    Number of children: Not on file    Years of education: Not on file    Highest education level: Not on file   Occupational History    Not on file   Tobacco Use    Smoking status: Former     Years: 45     Types: Cigarettes, Cigars     Quit date:      Years since quittin.1     Passive exposure: Past    Smokeless tobacco: Never   Vaping Use    Vaping Use: Never used   Substance and Sexual Activity    Alcohol use: Not Currently     Alcohol/week: 6.0 standard drinks of alcohol     Types: 6 Standard drinks or equivalent per week     Comment: 4 drinks weekly    Drug use: Never    Sexual activity: Not on file   Other Topics Concern    Not on file   Social History Narrative    Not on file     Social Determinants of Health     Financial Resource Strain: Not on file   Food Insecurity: Not on file   Transportation Needs: Not on file   Physical Activity: Not on file   Stress: Not on file   Social Connections: Not on file   Intimate  "Partner Violence: Not on file   Housing Stability: Not on file        Past Medical History:  Past Medical History:   Diagnosis Date    Allergic contact dermatitis 07/29/2016    Arthritis     Chronic pain disorder     GERD (gastroesophageal reflux disease)     Impotence 06/02/2015    Prostate cancer (CMS/[facility])     URI (upper respiratory infection) 06/27/2023       Past Surgical History:  Past Surgical History:   Procedure Laterality Date    HERNIA REPAIR      PROSTATE SURGERY  2021    Prostatectomy    SHOULDER SURGERY Right     Shoulder reconstruction       Family History:  Family History   Problem Relation Name Age of Onset    Brain cancer Mother          Objective:       12/19/2023     2:50 PM 12/19/2023     3:04 PM 12/19/2023     3:35 PM 1/3/2024     1:50 PM 1/22/2024     2:08 PM 1/25/2024     1:38 PM 3/6/2024     8:45 AM   Vitals   Systolic 148 145 149 132 152 144 110   Diastolic 92 81 88 76 88 84 63   Heart Rate 66 63 66 70 59 70 55   Resp 13 16 14   18    Height (in)    1.778 m (5' 10\")  1.753 m (5' 9\") 1.778 m (5' 10\")   Weight (lb)    205 206.2 206 203   BMI    29.41 kg/m2 29.59 kg/m2 30.42 kg/m2 29.13 kg/m2   BSA (m2)    2.14 m2 2.15 m2 2.13 m2 2.13 m2   Visit Report    Report Report Report Report      Constitutional:       Appearance: Healthy appearance. Not in distress.   Neck:      Vascular: No JVR. JVD normal.   Pulmonary:      Effort: Pulmonary effort is normal.      Breath sounds: Normal breath sounds. +ve wheezing. No rhonchi. No rales.   Chest:      Chest wall: Not tender to palpatation.   Cardiovascular:      PMI at left midclavicular line. Normal rate. Regular rhythm. Normal S1. Normal S2.       Murmurs: There is no murmur.      No gallop.  No click. No rub.   Pulses:     Intact distal pulses.   Edema:     Peripheral edema absent.   Abdominal:      General: Bowel sounds are normal.      Palpations: Abdomen is soft.      Tenderness: There is no abdominal tenderness.   Musculoskeletal: Normal range of " motion.         General: No tenderness.   Skin:     General: Skin is warm and dry.   Neurological:      General: No focal deficit present.      Mental Status: Alert and oriented to person, place and time.     Allergies:  Allergies   Allergen Reactions    Penicillins Unknown       Medications:  Current Outpatient Medications   Medication Instructions    methocarbamol (ROBAXIN) 500 mg, oral, 3 times daily PRN    omeprazole (PRILOSEC) 20 mg, oral, Do not crush or chew.    tadalafil (Cialis) 5 mg tablet 1 tablet, oral, Daily        Labs and Imaging:     Lab Results   Component Value Date    WBC 5.6 02/07/2024    HGB 13.3 (L) 02/07/2024    HCT 40.4 (L) 02/07/2024     02/07/2024    CHOL 185 02/07/2024    TRIG 90 02/07/2024    HDL 60.9 02/07/2024    ALT 20 02/07/2024    AST 20 02/07/2024     02/07/2024    K 4.3 02/07/2024     02/07/2024    CREATININE 0.74 02/07/2024    BUN 16 02/07/2024    CO2 29 02/07/2024    TSH 3.37 01/31/2022    PSA <0.10 09/22/2023         Echocardiogram: No results found for this or any previous visit from the past 1825 days.    Stress Testing: No results found for this or any previous visit from the past 1825 days.    Cardiac Catheterization: No results found for this or any previous visit from the past 1825 days.    Cardiac Scoring: No results found for this or any previous visit from the past 1825 days.    AAA :   AAA Screen     [place] [id number]  Tel [phone] and Fax [phone]      Vascular Lab Report  Abdominal Aorta Iliac Ultrasound/IVC Ultrasound      Patient Name:     ROSENDA Carias Physician:   [id number] Vickie Keller MD, RPVI  Study Date:       7/6/2023    Referring Physician: Dimple Yanes CNP  MRN/PID:          [id number]    PCP:  Accession/Order#: [id number]   CC Report to:  YOB: 1960   Technologist:        Micaela BAGLEY RVT  Gender:           M           Technologist  2:  Admission Status: Outpatient  Location Performed:  [facility]      Diagnosis/ICD: Z13.6-Encounter for screening for cardiovascular disorders (AAA)  Procedure/CPT: 13841 Ultrasound, abdominal aorta, real time with image  documentation, screening study for (AAA)-12892      Smoker: Former.      CONCLUSIONS:  Aorta/Common Iliac Arteries/IVC: Abdominal aorta is seen in segments due to bowel gas. The visualized segments of the abdominal aorta and bilateral common iliac arteries demonstrate no evidence of aneurysm on this screening examination.    Imaging & Doppler Findings:    AORTA     AP    Lateral  Proximal 2.40 cm 2.30 cm      [id number] Vickie Keller MD, RPVI  Electronically signed by [id number] Vickie Keller MD, RPVI on 7/6/2023 at 9:58:17 AM        Final     OTHER: No results found for this or any previous visit from the past 1825 days.      The 10-year ASCVD risk score (Darcy LAURA, et al., 2019) is: 8.4%    Values used to calculate the score:      Age: 63 years      Sex: Male      Is Non- : No      Diabetic: No      Tobacco smoker: No      Systolic Blood Pressure: 110 mmHg      Is BP treated: No      HDL Cholesterol: 60.9 mg/dL      Total Cholesterol: 185 mg/dL     Assessment/Plan:   1. Coronary arteriosclerosis        2. Abnormal stress test  Referral to Cardiology         Alfa Cast 63 y.o. male with hx of HLD, severe CAC, ex-smoker referred for abnormal treadmill  Stress test was intermediate risk, no cardiac symptoms     Plan  Will get a treadmill nuc  Will get an echo  Recommend HI statin   Recommend baby aspirin, he will buy OTC  He is actively wheezing, recommend PCP follow up for COPD? management     Time Spent: I spent  minutes reviewing medical testing, obtaining medical history and counselling and educating on diagnosis and documenting clinical encounter.         ____________________________________________________________  Wilbur Cyr MD   of  Medicine  Division of Cardiovascular Medicine   [facility] [place]  [facility]

## 2024-03-07 ENCOUNTER — TELEPHONE (OUTPATIENT)
Dept: PRIMARY CARE | Facility: CLINIC | Age: 64
End: 2024-03-07
Payer: MEDICARE

## 2024-03-07 NOTE — TELEPHONE ENCOUNTER
Alfa saw the cardiology yesterday to est care after you recommend and referred him to follow with cardio. He mentioned that the doctor did not do the testing that was recommenced to be done due to possible COPD and he was told that he needs to follow up with PCP as soon as possible to get this Dx and to continue with appropriate  action needed.   Please advise.

## 2024-03-11 DIAGNOSIS — R06.2 WHEEZING: ICD-10-CM

## 2024-03-11 DIAGNOSIS — R06.02 SOB (SHORTNESS OF BREATH): Primary | ICD-10-CM

## 2024-03-11 RX ORDER — ALBUTEROL SULFATE 90 UG/1
2 AEROSOL, METERED RESPIRATORY (INHALATION) EVERY 4 HOURS PRN
Qty: 8 G | Refills: 11 | Status: SHIPPED | OUTPATIENT
Start: 2024-03-11 | End: 2025-03-11

## 2024-03-11 NOTE — TELEPHONE ENCOUNTER
Okay, I would recommend a PFT to be done, I will order this if you can give him radiology scheduling number please. I read Cardio's note and he was having some SOB/Wheezing. Does he want to try an albuterol inhaler as needed while we wait for testing? This may help with his breathing issues.

## 2024-03-20 ENCOUNTER — HOSPITAL ENCOUNTER (OUTPATIENT)
Dept: RESPIRATORY THERAPY | Facility: HOSPITAL | Age: 64
Discharge: HOME | End: 2024-03-20
Payer: MEDICARE

## 2024-03-20 DIAGNOSIS — R06.2 WHEEZING: ICD-10-CM

## 2024-03-20 DIAGNOSIS — R06.02 SOB (SHORTNESS OF BREATH): ICD-10-CM

## 2024-03-20 PROCEDURE — 94729 DIFFUSING CAPACITY: CPT

## 2024-03-20 PROCEDURE — 94726 PLETHYSMOGRAPHY LUNG VOLUMES: CPT

## 2024-03-20 PROCEDURE — 94729 DIFFUSING CAPACITY: CPT | Performed by: PEDIATRICS

## 2024-03-20 PROCEDURE — 94760 N-INVAS EAR/PLS OXIMETRY 1: CPT

## 2024-03-20 PROCEDURE — 94060 EVALUATION OF WHEEZING: CPT | Performed by: PEDIATRICS

## 2024-03-20 PROCEDURE — 94664 DEMO&/EVAL PT USE INHALER: CPT

## 2024-03-20 PROCEDURE — 94060 EVALUATION OF WHEEZING: CPT

## 2024-03-20 PROCEDURE — 94726 PLETHYSMOGRAPHY LUNG VOLUMES: CPT | Performed by: PEDIATRICS

## 2024-03-26 ENCOUNTER — APPOINTMENT (OUTPATIENT)
Dept: PAIN MEDICINE | Facility: CLINIC | Age: 64
End: 2024-03-26
Payer: MEDICARE

## 2024-04-01 ENCOUNTER — HOSPITAL ENCOUNTER (OUTPATIENT)
Dept: RADIOLOGY | Facility: HOSPITAL | Age: 64
Discharge: HOME | End: 2024-04-01
Payer: MEDICARE

## 2024-04-01 ENCOUNTER — HOSPITAL ENCOUNTER (OUTPATIENT)
Dept: CARDIOLOGY | Facility: HOSPITAL | Age: 64
Discharge: HOME | End: 2024-04-01
Payer: MEDICARE

## 2024-04-01 DIAGNOSIS — I25.10 CORONARY ARTERIOSCLEROSIS: ICD-10-CM

## 2024-04-01 DIAGNOSIS — R94.39 ABNORMAL STRESS TEST: ICD-10-CM

## 2024-04-01 PROCEDURE — 78452 HT MUSCLE IMAGE SPECT MULT: CPT

## 2024-04-01 PROCEDURE — 93017 CV STRESS TEST TRACING ONLY: CPT

## 2024-04-01 PROCEDURE — 3430000001 HC RX 343 DIAGNOSTIC RADIOPHARMACEUTICALS: Performed by: STUDENT IN AN ORGANIZED HEALTH CARE EDUCATION/TRAINING PROGRAM

## 2024-04-01 PROCEDURE — A9502 TC99M TETROFOSMIN: HCPCS | Performed by: STUDENT IN AN ORGANIZED HEALTH CARE EDUCATION/TRAINING PROGRAM

## 2024-04-01 PROCEDURE — 78452 HT MUSCLE IMAGE SPECT MULT: CPT | Performed by: RADIOLOGY

## 2024-04-01 RX ADMIN — TETROFOSMIN 34 MILLICURIE: 0.23 INJECTION, POWDER, LYOPHILIZED, FOR SOLUTION INTRAVENOUS at 11:18

## 2024-04-01 RX ADMIN — TETROFOSMIN 10.4 MILLICURIE: 0.23 INJECTION, POWDER, LYOPHILIZED, FOR SOLUTION INTRAVENOUS at 10:07

## 2024-04-02 ENCOUNTER — HOSPITAL ENCOUNTER (OUTPATIENT)
Dept: CARDIOLOGY | Facility: HOSPITAL | Age: 64
Discharge: HOME | End: 2024-04-02
Payer: MEDICARE

## 2024-04-02 DIAGNOSIS — I25.10 CORONARY ARTERIOSCLEROSIS: ICD-10-CM

## 2024-04-02 DIAGNOSIS — R94.39 ABNORMAL STRESS TEST: ICD-10-CM

## 2024-04-02 LAB
AORTIC VALVE PEAK VELOCITY: 1.57 M/S
AV PEAK GRADIENT: 9.9 MMHG
AVA (PEAK VEL): 2.7 CM2
EJECTION FRACTION APICAL 4 CHAMBER: 55
LEFT ATRIUM VOLUME AREA LENGTH INDEX BSA: 33.2 ML/M2
LEFT VENTRICLE INTERNAL DIMENSION DIASTOLE: 5.11 CM (ref 3.5–6)
LEFT VENTRICULAR OUTFLOW TRACT DIAMETER: 2 CM
LV EJECTION FRACTION BIPLANE: 53 %
MITRAL VALVE E/A RATIO: 1.15
MITRAL VALVE E/E' RATIO: 8.99
RIGHT VENTRICLE FREE WALL PEAK S': 14 CM/S
RIGHT VENTRICLE PEAK SYSTOLIC PRESSURE: 10.3 MMHG
TRICUSPID ANNULAR PLANE SYSTOLIC EXCURSION: 1.9 CM

## 2024-04-02 PROCEDURE — 93306 TTE W/DOPPLER COMPLETE: CPT | Performed by: STUDENT IN AN ORGANIZED HEALTH CARE EDUCATION/TRAINING PROGRAM

## 2024-04-02 PROCEDURE — 2500000004 HC RX 250 GENERAL PHARMACY W/ HCPCS (ALT 636 FOR OP/ED): Performed by: STUDENT IN AN ORGANIZED HEALTH CARE EDUCATION/TRAINING PROGRAM

## 2024-04-02 PROCEDURE — 93306 TTE W/DOPPLER COMPLETE: CPT

## 2024-04-02 RX ADMIN — PERFLUTREN 1.5 ML OF DILUTION: 6.52 INJECTION, SUSPENSION INTRAVENOUS at 13:50

## 2024-04-03 ENCOUNTER — APPOINTMENT (OUTPATIENT)
Dept: CARDIOLOGY | Facility: CLINIC | Age: 64
End: 2024-04-03
Payer: MEDICARE

## 2024-04-03 ENCOUNTER — LAB (OUTPATIENT)
Dept: LAB | Facility: LAB | Age: 64
End: 2024-04-03
Payer: MEDICARE

## 2024-04-03 DIAGNOSIS — Z90.79 ACQUIRED ABSENCE OF OTHER GENITAL ORGAN(S): Primary | ICD-10-CM

## 2024-04-03 DIAGNOSIS — R97.20 PSA ELEVATION: ICD-10-CM

## 2024-04-03 PROCEDURE — 36415 COLL VENOUS BLD VENIPUNCTURE: CPT

## 2024-04-03 PROCEDURE — 84153 ASSAY OF PSA TOTAL: CPT

## 2024-04-04 LAB
MGC ASCENT PFT - FEV1 - POST: 3.07
MGC ASCENT PFT - FEV1 - PRE: 3.15
MGC ASCENT PFT - FEV1 - PREDICTED: 2.87
MGC ASCENT PFT - FVC - POST: 4.39
MGC ASCENT PFT - FVC - PRE: 4.43
MGC ASCENT PFT - FVC - PREDICTED: 3.71
PSA SERPL-MCNC: <0.1 NG/ML

## 2024-04-05 ENCOUNTER — OFFICE VISIT (OUTPATIENT)
Dept: CARDIOLOGY | Facility: CLINIC | Age: 64
End: 2024-04-05
Payer: MEDICARE

## 2024-04-05 VITALS
HEART RATE: 60 BPM | HEIGHT: 70 IN | BODY MASS INDEX: 29.75 KG/M2 | RESPIRATION RATE: 18 BRPM | DIASTOLIC BLOOD PRESSURE: 86 MMHG | OXYGEN SATURATION: 96 % | SYSTOLIC BLOOD PRESSURE: 120 MMHG | WEIGHT: 207.8 LBS

## 2024-04-05 DIAGNOSIS — I25.10 CORONARY ARTERIOSCLEROSIS: Primary | ICD-10-CM

## 2024-04-05 DIAGNOSIS — R94.39 ABNORMAL STRESS TEST: ICD-10-CM

## 2024-04-05 PROCEDURE — 1126F AMNT PAIN NOTED NONE PRSNT: CPT | Performed by: STUDENT IN AN ORGANIZED HEALTH CARE EDUCATION/TRAINING PROGRAM

## 2024-04-05 PROCEDURE — 1160F RVW MEDS BY RX/DR IN RCRD: CPT | Performed by: STUDENT IN AN ORGANIZED HEALTH CARE EDUCATION/TRAINING PROGRAM

## 2024-04-05 PROCEDURE — 99214 OFFICE O/P EST MOD 30 MIN: CPT | Performed by: STUDENT IN AN ORGANIZED HEALTH CARE EDUCATION/TRAINING PROGRAM

## 2024-04-05 PROCEDURE — 1159F MED LIST DOCD IN RCRD: CPT | Performed by: STUDENT IN AN ORGANIZED HEALTH CARE EDUCATION/TRAINING PROGRAM

## 2024-04-05 PROCEDURE — 1036F TOBACCO NON-USER: CPT | Performed by: STUDENT IN AN ORGANIZED HEALTH CARE EDUCATION/TRAINING PROGRAM

## 2024-04-05 ASSESSMENT — PAIN SCALES - GENERAL: PAINLEVEL: 0-NO PAIN

## 2024-04-05 NOTE — PROGRESS NOTES
Primary Care Physician: Veda Vital DO   Date of Visit: 2024  9:40 AM EDT  Type of Visit: follow up      Chief Complaint:  No chief complaint on file.       HPI  Alfa Cast 63 y.o. male with hx of HLD, severe CAC, ex-smoker  Here today for follow up     No chest pain  No sob   No camacho  Remains active  No le edema    Doing well on aspirin and statin         Review of Systems   Review of Systems   12 points review of systems are negative expect for the above    Social History:  Social History     Socioeconomic History    Marital status: Single     Spouse name: Not on file    Number of children: Not on file    Years of education: Not on file    Highest education level: Not on file   Occupational History    Not on file   Tobacco Use    Smoking status: Former     Years: 45     Types: Cigarettes, Cigars     Quit date:      Years since quittin.2     Passive exposure: Past    Smokeless tobacco: Never   Vaping Use    Vaping Use: Never used   Substance and Sexual Activity    Alcohol use: Not Currently     Alcohol/week: 6.0 standard drinks of alcohol     Types: 6 Standard drinks or equivalent per week     Comment: 4 drinks weekly    Drug use: Never    Sexual activity: Not on file   Other Topics Concern    Not on file   Social History Narrative    Not on file     Social Determinants of Health     Financial Resource Strain: Not on file   Food Insecurity: Not on file   Transportation Needs: Not on file   Physical Activity: Not on file   Stress: Not on file   Social Connections: Not on file   Intimate Partner Violence: Not on file   Housing Stability: Not on file        Past Medical History:  Past Medical History:   Diagnosis Date    Allergic contact dermatitis 2016    Arthritis     Chronic pain disorder     GERD (gastroesophageal reflux disease)     Impotence 2015    Prostate cancer (CMS/[facility])     URI (upper respiratory infection) 2023       Past Surgical History:  Past Surgical History:  "  Procedure Laterality Date    HERNIA REPAIR      PROSTATE SURGERY  2021    Prostatectomy    SHOULDER SURGERY Right     Shoulder reconstruction       Family History:  Family History   Problem Relation Name Age of Onset    Brain cancer Mother          Objective:       12/19/2023     2:50 PM 12/19/2023     3:04 PM 12/19/2023     3:35 PM 1/3/2024     1:50 PM 1/22/2024     2:08 PM 1/25/2024     1:38 PM 3/6/2024     8:45 AM   Vitals   Systolic 148 145 149 132 152 144 110   Diastolic 92 81 88 76 88 84 63   Heart Rate 66 63 66 70 59 70 55   Resp 13 16 14   18    Height (in)    1.778 m (5' 10\")  1.753 m (5' 9\") 1.778 m (5' 10\")   Weight (lb)    205 206.2 206 203   BMI    29.41 kg/m2 29.59 kg/m2 30.42 kg/m2 29.13 kg/m2   BSA (m2)    2.14 m2 2.15 m2 2.13 m2 2.13 m2   Visit Report    Report Report Report Report      Constitutional:       Appearance: Healthy appearance. Not in distress.   Neck:      Vascular: No JVR. JVD normal.   Pulmonary:      Effort: Pulmonary effort is normal.      Breath sounds: Normal breath sounds. No wheezing. No rhonchi. No rales.   Chest:      Chest wall: Not tender to palpatation.   Cardiovascular:      PMI at left midclavicular line. Normal rate. Regular rhythm. Normal S1. Normal S2.       Murmurs: There is no murmur.      No gallop.  No click. No rub.   Pulses:     Intact distal pulses.   Edema:     Peripheral edema absent.   Abdominal:      General: Bowel sounds are normal.      Palpations: Abdomen is soft.      Tenderness: There is no abdominal tenderness.   Musculoskeletal: Normal range of motion.         General: No tenderness.   Skin:     General: Skin is warm and dry.   Neurological:      General: No focal deficit present.      Mental Status: Alert and oriented to person, place and time.     Allergies:  Allergies   Allergen Reactions    Penicillins Unknown       Medications:  Current Outpatient Medications   Medication Instructions    albuterol (Ventolin HFA) 90 mcg/actuation inhaler 2 " puffs, inhalation, Every 4 hours PRN    atorvastatin (LIPITOR) 80 mg, oral, Daily    methocarbamol (ROBAXIN) 500 mg, oral, 3 times daily PRN    omeprazole (PRILOSEC) 20 mg, oral, Do not crush or chew.    tadalafil (Cialis) 5 mg tablet 1 tablet, oral, Daily        Labs and Imaging:     Lab Results   Component Value Date    WBC 5.6 02/07/2024    HGB 13.3 (L) 02/07/2024    HCT 40.4 (L) 02/07/2024     02/07/2024    CHOL 185 02/07/2024    TRIG 90 02/07/2024    HDL 60.9 02/07/2024    ALT 20 02/07/2024    AST 20 02/07/2024     02/07/2024    K 4.3 02/07/2024     02/07/2024    CREATININE 0.74 02/07/2024    BUN 16 02/07/2024    CO2 29 02/07/2024    TSH 3.37 01/31/2022    PSA <0.10 04/03/2024         Echocardiogram: No results found for this or any previous visit from the past 1825 days.    Stress Testing: No results found for this or any previous visit from the past 1825 days.    Cardiac Catheterization: No results found for this or any previous visit from the past 1825 days.    Cardiac Scoring: No results found for this or any previous visit from the past 1825 days.    AAA :   AAA Screen     [place] [address]  Tel [phone] and Fax [phone]      Vascular Lab Report  Abdominal Aorta Iliac Ultrasound/IVC Ultrasound      Patient Name:     ROSENDA Carias Physician:   [id number] Vickie Keller MD, RPVI  Study Date:       7/6/2023    Referring Physician: Dimple Yanes CNP  MRN/PID:          [id number]    PCP:  Accession/Order#: [id number]   CC Report to:  YOB: 1960   Technologist:        Micaela BAGLEY RVT  Gender:           M           Technologist 2:  Admission Status: Outpatient  Location Performed:  [facility]      Diagnosis/ICD: Z13.6-Encounter for screening for cardiovascular disorders (AAA)  Procedure/CPT: 58606 Ultrasound, abdominal aorta, real time with image  documentation, screening study for  (AAA)-08211      Smoker: Former.      CONCLUSIONS:  Aorta/Common Iliac Arteries/IVC: Abdominal aorta is seen in segments due to bowel gas. The visualized segments of the abdominal aorta and bilateral common iliac arteries demonstrate no evidence of aneurysm on this screening examination.    Imaging & Doppler Findings:    AORTA     AP    Lateral  Proximal 2.40 cm 2.30 cm      [id number] Vickie Keller MD, RPVI  Electronically signed by [id number] Vickie Keller MD, RPVI on 7/6/2023 at 9:58:17 AM         Final     OTHER: No results found for this or any previous visit from the past 1825 days.      The 10-year ASCVD risk score (Darcy LAURA, et al., 2019) is: 8.4%    Values used to calculate the score:      Age: 63 years      Sex: Male      Is Non- : No      Diabetic: No      Tobacco smoker: No      Systolic Blood Pressure: 110 mmHg      Is BP treated: No      HDL Cholesterol: 60.9 mg/dL      Total Cholesterol: 185 mg/dL     Assessment/Plan:   No diagnosis found.   Alfa Cast 63 y.o. male with hx of HLD, severe CAC, ex-smoker referred for abnormal treadmill  Stress test was intermediate risk, no cardiac symptoms.   Nuc treadmill normal  Echo unremarkable, mild MR        Plan  Continue Lipitor 80 mg every day, target ldl < 70, consider adding zetia    Continue baby aspirin    He will continue to follow up with his PCP. Thank you for the referral     Time Spent: I spent 30 minutes reviewing medical testing, obtaining medical history and counselling and educating on diagnosis and documenting clinical encounter.         ____________________________________________________________  Wlibur Cyr MD   of Medicine  Division of Cardiovascular Medicine   [facility] [place]  [facility]

## 2024-04-08 ENCOUNTER — APPOINTMENT (OUTPATIENT)
Dept: CARDIOLOGY | Facility: HOSPITAL | Age: 64
End: 2024-04-08
Payer: MEDICARE

## 2024-04-10 ENCOUNTER — OFFICE VISIT (OUTPATIENT)
Dept: PAIN MEDICINE | Facility: CLINIC | Age: 64
End: 2024-04-10
Payer: MEDICARE

## 2024-04-10 VITALS
BODY MASS INDEX: 29.63 KG/M2 | SYSTOLIC BLOOD PRESSURE: 120 MMHG | RESPIRATION RATE: 18 BRPM | DIASTOLIC BLOOD PRESSURE: 67 MMHG | WEIGHT: 207 LBS | OXYGEN SATURATION: 97 % | HEART RATE: 68 BPM | HEIGHT: 70 IN

## 2024-04-10 DIAGNOSIS — M51.36 DDD (DEGENERATIVE DISC DISEASE), LUMBAR: ICD-10-CM

## 2024-04-10 DIAGNOSIS — M54.16 LUMBAR RADICULOPATHY: Primary | ICD-10-CM

## 2024-04-10 PROCEDURE — 1125F AMNT PAIN NOTED PAIN PRSNT: CPT | Performed by: STUDENT IN AN ORGANIZED HEALTH CARE EDUCATION/TRAINING PROGRAM

## 2024-04-10 PROCEDURE — 99213 OFFICE O/P EST LOW 20 MIN: CPT | Performed by: STUDENT IN AN ORGANIZED HEALTH CARE EDUCATION/TRAINING PROGRAM

## 2024-04-10 PROCEDURE — 1159F MED LIST DOCD IN RCRD: CPT | Performed by: STUDENT IN AN ORGANIZED HEALTH CARE EDUCATION/TRAINING PROGRAM

## 2024-04-10 PROCEDURE — 1160F RVW MEDS BY RX/DR IN RCRD: CPT | Performed by: STUDENT IN AN ORGANIZED HEALTH CARE EDUCATION/TRAINING PROGRAM

## 2024-04-10 PROCEDURE — 1036F TOBACCO NON-USER: CPT | Performed by: STUDENT IN AN ORGANIZED HEALTH CARE EDUCATION/TRAINING PROGRAM

## 2024-04-10 RX ORDER — BISMUTH SUBSALICYLATE 262 MG
1 TABLET,CHEWABLE ORAL DAILY
COMMUNITY

## 2024-04-10 ASSESSMENT — PATIENT HEALTH QUESTIONNAIRE - PHQ9
SUM OF ALL RESPONSES TO PHQ9 QUESTIONS 1 AND 2: 0
1. LITTLE INTEREST OR PLEASURE IN DOING THINGS: NOT AT ALL
2. FEELING DOWN, DEPRESSED OR HOPELESS: NOT AT ALL

## 2024-04-10 ASSESSMENT — PAIN - FUNCTIONAL ASSESSMENT
PAIN_FUNCTIONAL_ASSESSMENT: 0-10
PAIN_FUNCTIONAL_ASSESSMENT: 0-10

## 2024-04-10 ASSESSMENT — PAIN SCALES - GENERAL
PAINLEVEL_OUTOF10: 2
PAINLEVEL: 2

## 2024-04-10 ASSESSMENT — PAIN DESCRIPTION - DESCRIPTORS: DESCRIPTORS: SHARP;DULL

## 2024-04-10 NOTE — PROGRESS NOTES
Pt given [facility] Scheduling number to schedule MRI and will call after for TELEV appointment.

## 2024-04-10 NOTE — PROGRESS NOTES
[facility]  Follow Up Office Visit Note 4/10/2024    Patient Information: Alfa Cast, MRN: [id number], : 1960   Primary Care/Referring Physician: Veda Vital DO, [address] [place] / [place] [address]     Chief Complaint: Low  back and left leg pain    Interval History: Mr. Cast presents for follow up. At his last visit I recommended he start PT    Today he reports no major changes since last seen. He feels that the last injection is still providing some relief but his pain has been gradually worsening. He did finish PT with mild improvement in his symptoms.  He has been doing more yard work recently and still notices some limitation in his activity level.    Brief History of Pain: Mr. Alfa Cast is a 63 y.o. male with a PMHx of prostate cancer who presents for low back and left leg pain.    He reports onset of pain a few days before  of . There was no obvious injury prior to this happening. He initially had left sided low back and buttock pain which then progressed to pain radiating further down his posterolateral leg to the front of his left shin. His pain worsens with twisting movements and lying down flat and improves with sitting and lying on his right side. He reports some numbness along his left anterior shin    Was initially evaluated with a CT, with concern for L3 compression fracture. Given his history of prostate cancer a lumbar spine MRI was ordered, which shows likely chronic fracture at L3 and evidence of severe foraminal stenosis at L4/5. He was evaluated by Dr. Gonzalez who discussed possible foraminotomy but the patient would rather try more conservative measures first    Current Pain Medications: None  Previously Tried Pain Medications: Norco, Cyclobenzaprine, Methocarbamol, PO steroids, OTC Ibuprofen, Gabapentin - caused confusion    Relevant Surgeries: Denies  Injections: Left L4 TFESI - 80% pain relief   Physical/Occupational Therapy: He has  completed PT with mild improvement    Medications:   Current Outpatient Medications   Medication Instructions    albuterol (Ventolin HFA) 90 mcg/actuation inhaler 2 puffs, inhalation, Every 4 hours PRN    atorvastatin (LIPITOR) 80 mg, oral, Daily    methocarbamol (ROBAXIN) 500 mg, oral, 3 times daily PRN    multivitamin tablet 1 tablet, oral, Daily    omeprazole (PRILOSEC) 20 mg, oral, Do not crush or chew.    tadalafil (Cialis) 5 mg tablet 1 tablet, oral, Daily      Allergies:   Allergies   Allergen Reactions    Penicillins Unknown       Past Medical & Surgical History:  Past Medical History:   Diagnosis Date    Allergic contact dermatitis 2016    Arthritis     Chronic pain disorder     GERD (gastroesophageal reflux disease)     Impotence 2015    Prostate cancer ([facility])     URI (upper respiratory infection) 2023      Past Surgical History:   Procedure Laterality Date    HERNIA REPAIR      PROSTATE SURGERY      Prostatectomy    SHOULDER SURGERY Right     Shoulder reconstruction       Family History   Problem Relation Name Age of Onset    Brain cancer Mother       Social History     Socioeconomic History    Marital status: Single     Spouse name: Not on file    Number of children: Not on file    Years of education: Not on file    Highest education level: Not on file   Occupational History    Not on file   Tobacco Use    Smoking status: Former     Current packs/day: 0.00     Types: Cigarettes, Cigars     Start date:      Quit date:      Years since quittin.2     Passive exposure: Past    Smokeless tobacco: Never   Vaping Use    Vaping status: Never Used   Substance and Sexual Activity    Alcohol use: Yes     Alcohol/week: 6.0 standard drinks of alcohol     Types: 6 Standard drinks or equivalent per week     Comment: 4 drinks weekly    Drug use: Never    Sexual activity: Not on file   Other Topics Concern    Not on file   Social History Narrative    Not on file     Social  "Determinants of Health     Financial Resource Strain: Not on file   Food Insecurity: Not on file   Transportation Needs: Not on file   Physical Activity: Not on file   Stress: Not on file   Social Connections: Not on file   Intimate Partner Violence: Not on file   Housing Stability: Not on file       Problems, Past medical history, past surgical history, Medications, allergies, social and family history reviewed and as per the electronic medical record from today's encounter    Review of Systems:  CONST: No fever, chills, fatigue, weight changes  EYES: No loss of vision  ENT: No hearing loss, tinnitus  CV: No chest pain, palpitations  RESP: No dyspnea, shortness of breath, cough  GI: No stool incontinence, nausea, vomiting  : Reports urinary incontinence (has this at baseline since prostate cancer)  MSK: No joint swelling  SKIN: No rash, no hives  NEURO: No headache, dizziness  PSYCH: No anxiety, depression  HEM/LYMPH: No easy bruising or bleeding  All other systems reviewed are negative     Physical Exam:  Vitals: /67   Pulse 68   Resp 18   Ht 1.778 m (5' 10\")   Wt 93.9 kg (207 lb)   SpO2 97%   BMI 29.70 kg/m²   General: No apparent distress. Alert, appropriate, oriented x 3. Mood generally positive, affect congruent. Speaking in full sentences.   HENT: Normocephalic, atraumatic. Hearing intact.  Eyes: Pupils equal and round  Neck: Supple, trachea midline  Lungs: Symmetric respiratory excursion on visual exam, nonlabored breathing.   Extremities: No cyanosis or edema noted in extremities.  Skin: No rashes, lesions noted.  Neuro: Alert and appropriate. Gait within normal limits. Bulk and tone within normal limits.    Laboratory Data:  The following laboratory data were reviewed during this visit:   Lab Results   Component Value Date    WBC 5.6 02/07/2024    RBC 4.33 (L) 02/07/2024    HGB 13.3 (L) 02/07/2024    HCT 40.4 (L) 02/07/2024     02/07/2024      No results found for: \"INR\"  Lab Results "   Component Value Date    CREATININE 0.74 02/07/2024       Imaging:  The following imaging impressions were reviewed by me during this visit:    -12/19/23 lumbar spine MRI shows mild loss of height of the superior endplate of L3 with no associated increased STIR. L2-3 mild-to-moderate right neural foraminal narrowing. L3-4 moderate left foraminal narrowing. L4-5 mild circumferential disc bulge with ligamentum flavum hypertrophy and facet arthropathy results in mild-to-moderate spinal canal narrowing, severe left neural foraminal narrowing with contact upon the exiting left-sided nerve root and moderate-to-severe right neural foraminal narrowing.    I also personally reviewed the images from the above studies myself. These images and my interpretation of them contributed to the management and decision making of the patient's medical plan.    ASSESSMENT:  Mr. Alfa Cast is a 63 y.o. male with low back and left leg pain that is consistent with:    1. Lumbar radiculopathy    2. DDD (degenerative disc disease), lumbar        PLAN:    Diagnostics:   -Reviewed his recent lumbar spine MRI which is most significant for severe foraminal narrowing on the left at L4/5.  No additional diagnostics at this time    Physical Therapy and Rehabilitation:     -He completed physical therapy recently with some improvement.  He should maintain his regular home exercise instruction program    Psychologically:  -No needs at this time    Medications  - Continue OTC Ibuprofen.  Consider trial of alternative NSAID in the future    Duration  - 1.5 months    Interventions:  -I suspect his pain is primarily due to lumbar radiculopathy in the setting of severe foraminal narrowing on the left at L4/5.  He underwent a left transforaminal ELOINA at L4/5 with 80% improvement in his pain for at least 3 months, but feels his pain has been gradually worsening recently. I would consider repeating in the future with particulate steroid to see if this  provides more durable pain relief. He will call if he wishes to proceed with this.   - Continue following with Dr. Gonzalez regarding need for foraminotomy        Sincerely,  Andrzej Stahl MD  [facility] - [place]

## 2024-04-19 ENCOUNTER — OFFICE VISIT (OUTPATIENT)
Dept: PAIN MEDICINE | Facility: CLINIC | Age: 64
End: 2024-04-19
Payer: MEDICARE

## 2024-04-19 VITALS
WEIGHT: 207 LBS | HEART RATE: 68 BPM | DIASTOLIC BLOOD PRESSURE: 80 MMHG | RESPIRATION RATE: 18 BRPM | BODY MASS INDEX: 29.63 KG/M2 | SYSTOLIC BLOOD PRESSURE: 134 MMHG | HEIGHT: 70 IN

## 2024-04-19 DIAGNOSIS — M51.36 DDD (DEGENERATIVE DISC DISEASE), LUMBAR: ICD-10-CM

## 2024-04-19 DIAGNOSIS — M54.16 LUMBAR RADICULOPATHY: Primary | ICD-10-CM

## 2024-04-19 PROCEDURE — 1125F AMNT PAIN NOTED PAIN PRSNT: CPT | Performed by: STUDENT IN AN ORGANIZED HEALTH CARE EDUCATION/TRAINING PROGRAM

## 2024-04-19 PROCEDURE — 1036F TOBACCO NON-USER: CPT | Performed by: STUDENT IN AN ORGANIZED HEALTH CARE EDUCATION/TRAINING PROGRAM

## 2024-04-19 PROCEDURE — 1159F MED LIST DOCD IN RCRD: CPT | Performed by: STUDENT IN AN ORGANIZED HEALTH CARE EDUCATION/TRAINING PROGRAM

## 2024-04-19 PROCEDURE — 1160F RVW MEDS BY RX/DR IN RCRD: CPT | Performed by: STUDENT IN AN ORGANIZED HEALTH CARE EDUCATION/TRAINING PROGRAM

## 2024-04-19 PROCEDURE — 99214 OFFICE O/P EST MOD 30 MIN: CPT | Performed by: STUDENT IN AN ORGANIZED HEALTH CARE EDUCATION/TRAINING PROGRAM

## 2024-04-19 RX ORDER — OXYCODONE AND ACETAMINOPHEN 5; 325 MG/1; MG/1
1 TABLET ORAL EVERY 8 HOURS PRN
Qty: 21 TABLET | Refills: 0 | Status: SHIPPED | OUTPATIENT
Start: 2024-04-19 | End: 2024-04-26

## 2024-04-19 ASSESSMENT — PAIN - FUNCTIONAL ASSESSMENT: PAIN_FUNCTIONAL_ASSESSMENT: 0-10

## 2024-04-19 ASSESSMENT — PAIN SCALES - GENERAL
PAINLEVEL_OUTOF10: 4
PAINLEVEL: 4

## 2024-04-19 ASSESSMENT — PAIN DESCRIPTION - DESCRIPTORS: DESCRIPTORS: ACHING;SHARP

## 2024-04-19 NOTE — PROGRESS NOTES
[facility]  Follow Up Office Visit Note 2024    Patient Information: Alfa Cast, MRN: [id number], : 1960   Primary Care/Referring Physician: Veda Vital DO, [address] [place] / [place] [address]     Chief Complaint: Low  back and left leg pain    Interval History: Mr. Cast presents for follow up. At his last visit I discussed repeating a left L4/5 TFESI w/ particulate steroid     His pain has been worsening since last seen and he would like to proceed with injection    Brief History of Pain: Mr. Alfa Cast is a 63 y.o. male with a PMHx of prostate cancer who presents for low back and left leg pain.    He reports onset of pain a few days before  of . There was no obvious injury prior to this happening. He initially had left sided low back and buttock pain which then progressed to pain radiating further down his posterolateral leg to the front of his left shin. His pain worsens with twisting movements and lying down flat and improves with sitting and lying on his right side. He reports some numbness along his left anterior shin    Was initially evaluated with a CT, with concern for L3 compression fracture. Given his history of prostate cancer a lumbar spine MRI was ordered, which shows likely chronic fracture at L3 and evidence of severe foraminal stenosis at L4/5. He was evaluated by Dr. Gonzalez who discussed possible foraminotomy but the patient would rather try more conservative measures first    Current Pain Medications: None  Previously Tried Pain Medications: Norco, Cyclobenzaprine, Methocarbamol, PO steroids, OTC Ibuprofen, Gabapentin - caused confusion    Relevant Surgeries: Denies  Injections: Left L4 TFESI - 80% pain relief   Physical/Occupational Therapy: He has completed PT with mild improvement    Medications:   Current Outpatient Medications   Medication Instructions    albuterol (Ventolin HFA) 90 mcg/actuation inhaler 2 puffs, inhalation, Every 4  hours PRN    atorvastatin (LIPITOR) 80 mg, oral, Daily    methocarbamol (ROBAXIN) 500 mg, oral, 3 times daily PRN    multivitamin tablet 1 tablet, oral, Daily    omeprazole (PRILOSEC) 20 mg, oral, Do not crush or chew.    oxyCODONE-acetaminophen (Percocet) 5-325 mg tablet 1 tablet, oral, Every 8 hours PRN    tadalafil (Cialis) 5 mg tablet 1 tablet, oral, Daily      Allergies:   Allergies   Allergen Reactions    Penicillins Unknown       Past Medical & Surgical History:  Past Medical History:   Diagnosis Date    Allergic contact dermatitis 2016    Arthritis     Chronic pain disorder     GERD (gastroesophageal reflux disease)     Impotence 2015    Prostate cancer ([facility])     URI (upper respiratory infection) 2023      Past Surgical History:   Procedure Laterality Date    HERNIA REPAIR      PROSTATE SURGERY      Prostatectomy    SHOULDER SURGERY Right     Shoulder reconstruction       Family History   Problem Relation Name Age of Onset    Brain cancer Mother       Social History     Socioeconomic History    Marital status: Single     Spouse name: Not on file    Number of children: Not on file    Years of education: Not on file    Highest education level: Not on file   Occupational History    Not on file   Tobacco Use    Smoking status: Former     Current packs/day: 0.00     Types: Cigarettes, Cigars     Start date:      Quit date:      Years since quittin.2     Passive exposure: Past    Smokeless tobacco: Never   Vaping Use    Vaping status: Never Used   Substance and Sexual Activity    Alcohol use: Yes     Alcohol/week: 6.0 standard drinks of alcohol     Types: 6 Standard drinks or equivalent per week     Comment: 4 drinks weekly    Drug use: Never    Sexual activity: Not on file   Other Topics Concern    Not on file   Social History Narrative    Not on file     Social Determinants of Health     Financial Resource Strain: Not on file   Food Insecurity: Not on file   Transportation  "Needs: Not on file   Physical Activity: Not on file   Stress: Not on file   Social Connections: Not on file   Intimate Partner Violence: Not on file   Housing Stability: Not on file       Problems, Past medical history, past surgical history, Medications, allergies, social and family history reviewed and as per the electronic medical record from today's encounter    Review of Systems:  CONST: No fever, chills, fatigue, weight changes  EYES: No loss of vision  ENT: No hearing loss, tinnitus  CV: No chest pain, palpitations  RESP: No dyspnea, shortness of breath, cough  GI: No stool incontinence, nausea, vomiting  : Reports urinary incontinence (has this at baseline since prostate cancer)  MSK: No joint swelling  SKIN: No rash, no hives  NEURO: No headache, dizziness  PSYCH: No anxiety, depression  HEM/LYMPH: No easy bruising or bleeding  All other systems reviewed are negative     Physical Exam:  Vitals: /80   Pulse 68   Resp 18   Ht 1.778 m (5' 10\")   Wt 93.9 kg (207 lb)   BMI 29.70 kg/m²   General: No apparent distress. Alert, appropriate, oriented x 3. Mood generally positive, affect congruent. Speaking in full sentences.   HENT: Normocephalic, atraumatic. Hearing intact.  Eyes: Pupils equal and round  Neck: Supple, trachea midline  Lungs: Symmetric respiratory excursion on visual exam, nonlabored breathing.   Extremities: No cyanosis or edema noted in extremities.  Skin: No rashes, lesions noted.  Neuro: Alert and appropriate. Gait within normal limits. Bulk and tone within normal limits.    Laboratory Data:  The following laboratory data were reviewed during this visit:   Lab Results   Component Value Date    WBC 5.6 02/07/2024    RBC 4.33 (L) 02/07/2024    HGB 13.3 (L) 02/07/2024    HCT 40.4 (L) 02/07/2024     02/07/2024      No results found for: \"INR\"  Lab Results   Component Value Date    CREATININE 0.74 02/07/2024       Imaging:  The following imaging impressions were reviewed by me " during this visit:    -12/19/23 lumbar spine MRI shows mild loss of height of the superior endplate of L3 with no associated increased STIR. L2-3 mild-to-moderate right neural foraminal narrowing. L3-4 moderate left foraminal narrowing. L4-5 mild circumferential disc bulge with ligamentum flavum hypertrophy and facet arthropathy results in mild-to-moderate spinal canal narrowing, severe left neural foraminal narrowing with contact upon the exiting left-sided nerve root and moderate-to-severe right neural foraminal narrowing.    I also personally reviewed the images from the above studies myself. These images and my interpretation of them contributed to the management and decision making of the patient's medical plan.    ASSESSMENT:  Mr. Alfa Cast is a 63 y.o. male with low back and left leg pain that is consistent with:    1. Lumbar radiculopathy    2. DDD (degenerative disc disease), lumbar          PLAN:    Diagnostics:   -Reviewed his recent lumbar spine MRI which is most significant for severe foraminal narrowing on the left at L4/5.  No additional diagnostics at this time    Physical Therapy and Rehabilitation:     -He completed physical therapy recently with some improvement.  He should maintain his regular home exercise instruction program    Psychologically:  -No needs at this time    Medications  - Continue OTC Ibuprofen.  Consider trial of alternative NSAID in the future  -Given that I will be unable to perform his injection for at least 2 weeks I did provide a small prescription for Percocet to use for severe pain    Duration  - 1.5 months    Interventions:  -I suspect his pain is primarily due to lumbar radiculopathy in the setting of severe foraminal narrowing on the left at L4/5.  He underwent a left transforaminal ELOINA at L4/5 with 80% improvement in his pain for around 2-3 months, but his pain has been worsening. I recommend repeating this injection utilizing live fluoroscopy and local anesthesia,  and utilizing particulate steroid to see if this provides more durable pain relief. He would like to proceed with this  - Continue following with Dr. Gonzalez regarding need for foraminotomy        Sincerely,  Andrzej Stahl MD  [facility] - [place]

## 2024-04-24 ENCOUNTER — TELEPHONE (OUTPATIENT)
Dept: PAIN MEDICINE | Facility: CLINIC | Age: 64
End: 2024-04-24
Payer: MEDICARE

## 2024-05-03 ENCOUNTER — ANCILLARY PROCEDURE (OUTPATIENT)
Dept: RADIOLOGY | Facility: EXTERNAL LOCATION | Age: 64
End: 2024-05-03
Payer: MEDICARE

## 2024-05-03 DIAGNOSIS — M54.16 RADICULOPATHY, LUMBAR REGION: ICD-10-CM

## 2024-05-10 ENCOUNTER — ANCILLARY PROCEDURE (OUTPATIENT)
Dept: RADIOLOGY | Facility: EXTERNAL LOCATION | Age: 64
End: 2024-05-10
Payer: MEDICARE

## 2024-05-10 DIAGNOSIS — M54.16 RADICULOPATHY, LUMBAR REGION: ICD-10-CM

## 2024-05-10 PROCEDURE — 64483 NJX AA&/STRD TFRM EPI L/S 1: CPT | Performed by: STUDENT IN AN ORGANIZED HEALTH CARE EDUCATION/TRAINING PROGRAM

## 2024-05-10 NOTE — PROGRESS NOTES
Procedure Note: 5/10/2024    PROCEDURE NAME: Left transforaminal epidural steroid injection at L4    Patient Information: Alfa Cast, MRN (from [facility]) [id number], : 1960  Chief Complaint: Left leg pain    Pain Diagnosis: The diagnosis of Radiculopathy, lumbar region  has been made given the patient's clinical evaluation at prior evaluation.      DESCRIPTION OF PROCEDURE:    Alfa Cast was brought to the procedure room. The assistant obtained vital signs, which were found to be stable. He was then informed of the procedure, its benefits, and its risks, and his questions were answered regarding the procedure. Written informed consent was obtained from the patient. Immediately prior to starting the procedure, a time-out safety check was conducted and the patient's identification, procedure name, and procedure site were confirmed with the patient.    Left L4 Transforaminal Epidural Steroid Injection w/ particulate steroid  After informed written consent was obtained, the patient was placed in prone position with a pillow under the abdomen to reduce lumbar lordosis. Monitoring of pulse oximetry, heart rate, and blood pressure was done pre-procedure, and post-procedure. During the procedure the patient was monitored with continuous pulse-ox. A time out was performed before the beginning of the procedure. The correct site and laterality were marked. The skin was prepped with chlorhexidine, allowed to dry for a minimum of 3 minutes, and draped in a sterile fashion     The L4/5 vertebral level was identified with use of fluoroscopy in AP and oblique views. The fluoroscope was obliqued to the left to visualize the neuroforamen and the target in the superior foramen just lateral the inferior articular process was identified and marked. The skin and subcutaneous tissue over this site was anesthetized using 3 ml of 1% lidocaine with a 1.5 inch 25G needle. A 3.5 inch 22G spinal needle was then slowly advanced co-axially until  the tip just entered the foramen. Needle position was confirmed using AP and lateral views as it entered the neuroforamen in a supraneural approach. After negative aspiration for blood or CSF, 1 ml of Omnipaque was injected under live fluoroscopy, with contrast found to spread medially into the epidural space. Next, 2 mL of a mixture of 40 mg triamcinolone diluted in 1 mL of 1% lidocaine was injected incrementally at each level. The stylet was replaced and the needle was removed    Of note, I used a 15 degree angle off midline in order to get into the foramen      Anesthesia: local anesthesia   Complications: none      Andrzej Stahl MD

## 2024-05-22 ENCOUNTER — OFFICE VISIT (OUTPATIENT)
Dept: PAIN MEDICINE | Facility: CLINIC | Age: 64
End: 2024-05-22
Payer: MEDICARE

## 2024-05-22 VITALS
RESPIRATION RATE: 18 BRPM | BODY MASS INDEX: 29.63 KG/M2 | HEART RATE: 61 BPM | SYSTOLIC BLOOD PRESSURE: 137 MMHG | DIASTOLIC BLOOD PRESSURE: 66 MMHG | WEIGHT: 207 LBS | HEIGHT: 70 IN | OXYGEN SATURATION: 98 %

## 2024-05-22 DIAGNOSIS — M51.36 DDD (DEGENERATIVE DISC DISEASE), LUMBAR: ICD-10-CM

## 2024-05-22 DIAGNOSIS — M54.16 LUMBAR RADICULOPATHY: Primary | ICD-10-CM

## 2024-05-22 PROCEDURE — 1125F AMNT PAIN NOTED PAIN PRSNT: CPT | Performed by: STUDENT IN AN ORGANIZED HEALTH CARE EDUCATION/TRAINING PROGRAM

## 2024-05-22 PROCEDURE — 1159F MED LIST DOCD IN RCRD: CPT | Performed by: STUDENT IN AN ORGANIZED HEALTH CARE EDUCATION/TRAINING PROGRAM

## 2024-05-22 PROCEDURE — 99213 OFFICE O/P EST LOW 20 MIN: CPT | Performed by: STUDENT IN AN ORGANIZED HEALTH CARE EDUCATION/TRAINING PROGRAM

## 2024-05-22 PROCEDURE — 1160F RVW MEDS BY RX/DR IN RCRD: CPT | Performed by: STUDENT IN AN ORGANIZED HEALTH CARE EDUCATION/TRAINING PROGRAM

## 2024-05-22 PROCEDURE — 1036F TOBACCO NON-USER: CPT | Performed by: STUDENT IN AN ORGANIZED HEALTH CARE EDUCATION/TRAINING PROGRAM

## 2024-05-22 ASSESSMENT — PATIENT HEALTH QUESTIONNAIRE - PHQ9
1. LITTLE INTEREST OR PLEASURE IN DOING THINGS: NOT AT ALL
2. FEELING DOWN, DEPRESSED OR HOPELESS: NOT AT ALL
SUM OF ALL RESPONSES TO PHQ9 QUESTIONS 1 AND 2: 0

## 2024-05-22 ASSESSMENT — PAIN - FUNCTIONAL ASSESSMENT: PAIN_FUNCTIONAL_ASSESSMENT: 0-10

## 2024-05-22 ASSESSMENT — PAIN SCALES - GENERAL
PAINLEVEL_OUTOF10: 4
PAINLEVEL: 4

## 2024-05-22 ASSESSMENT — PAIN DESCRIPTION - DESCRIPTORS: DESCRIPTORS: CRAMPING;ACHING

## 2024-05-22 NOTE — PROGRESS NOTES
[facility]  Follow Up Office Visit Note 2024    Patient Information: Alfa Cast, MRN: [id number], : 1960   Primary Care/Referring Physician: Veda Vital DO, [address] [place] / [place] [address]     Chief Complaint: Low back and left leg pain    Interval History: Mr. Cast presents for follow up after left L4 TFESI with particulate steroid.     Today he reports 90% pain relief for the first few days, but his pain is already starting to return. He went on a trip to [place] and feels this exacerbated his pain. He has been having more trouble dorsiflexing/plantarflexing his left foot recently as well    Brief History of Pain: Mr. Alfa Cast is a 63 y.o. male with a PMHx of prostate cancer who presents for low back and left leg pain.    He reports onset of pain a few days before 2023. There was no obvious injury prior to this happening. He initially had left sided low back and buttock pain which then progressed to pain radiating further down his posterolateral leg to the front of his left shin. His pain worsens with twisting movements and lying down flat and improves with sitting and lying on his right side. He reports some numbness along his left anterior shin    Was initially evaluated with a CT, with concern for L3 compression fracture. Given his history of prostate cancer a lumbar spine MRI was ordered, which shows likely chronic fracture at L3 and evidence of severe foraminal stenosis at L4/5. He was evaluated by Dr. Gonzalez who discussed possible foraminotomy but the patient would rather try more conservative measures first    Current Pain Medications: None  Previously Tried Pain Medications: Norco, Cyclobenzaprine, Methocarbamol, PO steroids, OTC Ibuprofen, Gabapentin - caused confusion    Relevant Surgeries: Denies  Injections: Left L4 TFESI - 80% pain relief for a few months, with repeat injection providing significant pain relief but only for a few days    Physical/Occupational Therapy: He has completed PT with mild improvement    Medications:   Current Outpatient Medications   Medication Instructions    albuterol (Ventolin HFA) 90 mcg/actuation inhaler 2 puffs, inhalation, Every 4 hours PRN    atorvastatin (LIPITOR) 80 mg, oral, Daily    methocarbamol (ROBAXIN) 500 mg, oral, 3 times daily PRN    multivitamin tablet 1 tablet, oral, Daily    omeprazole (PRILOSEC) 20 mg, oral, Do not crush or chew.    tadalafil (Cialis) 5 mg tablet 1 tablet, oral, Daily      Allergies:   Allergies   Allergen Reactions    Penicillins Unknown    Bupropion Rash       Past Medical & Surgical History:  Past Medical History:   Diagnosis Date    Allergic contact dermatitis 2016    Arthritis     Chronic pain disorder     GERD (gastroesophageal reflux disease)     Impotence 2015    Prostate cancer (Multi)     URI (upper respiratory infection) 2023      Past Surgical History:   Procedure Laterality Date    HERNIA REPAIR      PROSTATE SURGERY      Prostatectomy    SHOULDER SURGERY Right     Shoulder reconstruction       Family History   Problem Relation Name Age of Onset    Brain cancer Mother       Social History     Socioeconomic History    Marital status: Single     Spouse name: Not on file    Number of children: Not on file    Years of education: Not on file    Highest education level: Not on file   Occupational History    Not on file   Tobacco Use    Smoking status: Former     Current packs/day: 0.00     Types: Cigarettes, Cigars     Start date:      Quit date:      Years since quittin.3     Passive exposure: Past    Smokeless tobacco: Never   Vaping Use    Vaping status: Never Used   Substance and Sexual Activity    Alcohol use: Yes     Alcohol/week: 6.0 standard drinks of alcohol     Types: 6 Standard drinks or equivalent per week     Comment: 4 drinks weekly    Drug use: Never    Sexual activity: Not on file   Other Topics Concern    Not on file   Social  "History Narrative    Not on file     Social Determinants of Health     Financial Resource Strain: Not on file   Food Insecurity: Not on file   Transportation Needs: Not on file   Physical Activity: Not on file   Stress: Not on file   Social Connections: Not on file   Intimate Partner Violence: Not on file   Housing Stability: Not on file       Problems, Past medical history, past surgical history, Medications, allergies, social and family history reviewed and as per the electronic medical record from today's encounter    Review of Systems:  CONST: No fever, chills, fatigue, weight changes  EYES: No loss of vision  ENT: No hearing loss, tinnitus  CV: No chest pain, palpitations  RESP: No dyspnea, shortness of breath, cough  GI: No stool incontinence, nausea, vomiting  : Reports urinary incontinence (has this at baseline since prostate cancer)  MSK: No joint swelling  SKIN: No rash, no hives  NEURO: No headache, dizziness  PSYCH: No anxiety, depression  HEM/LYMPH: No easy bruising or bleeding  All other systems reviewed are negative     Physical Exam:  Vitals: /66   Pulse 61   Resp 18   Ht 1.778 m (5' 10\")   Wt 93.9 kg (207 lb)   SpO2 98%   BMI 29.70 kg/m²   General: No apparent distress. Alert, appropriate, oriented x 3. Mood generally positive, affect congruent. Speaking in full sentences.   HENT: Normocephalic, atraumatic. Hearing intact.  Eyes: Pupils equal and round  Neck: Supple, trachea midline  Lungs: Symmetric respiratory excursion on visual exam, nonlabored breathing.   Extremities: No cyanosis or edema noted in extremities.  Skin: No rashes, lesions noted.  Neuro: Alert and appropriate. Gait within normal limits. Bulk and tone within normal limits.    Laboratory Data:  The following laboratory data were reviewed during this visit:   Lab Results   Component Value Date    WBC 5.6 02/07/2024    RBC 4.33 (L) 02/07/2024    HGB 13.3 (L) 02/07/2024    HCT 40.4 (L) 02/07/2024     02/07/2024    " "  No results found for: \"INR\"  Lab Results   Component Value Date    CREATININE 0.74 02/07/2024       Imaging:  The following imaging impressions were reviewed by me during this visit:    -12/19/23 lumbar spine MRI shows mild loss of height of the superior endplate of L3 with no associated increased STIR. L2-3 mild-to-moderate right neural foraminal narrowing. L3-4 moderate left foraminal narrowing. L4-5 mild circumferential disc bulge with ligamentum flavum hypertrophy and facet arthropathy results in mild-to-moderate spinal canal narrowing, severe left neural foraminal narrowing with contact upon the exiting left-sided nerve root and moderate-to-severe right neural foraminal narrowing.    I also personally reviewed the images from the above studies myself. These images and my interpretation of them contributed to the management and decision making of the patient's medical plan.    ASSESSMENT:  Mr. Alfa Cast is a 63 y.o. male with low back and left leg pain that is consistent with:    1. Lumbar radiculopathy    2. DDD (degenerative disc disease), lumbar        PLAN:    Diagnostics:   -Reviewed his recent lumbar spine MRI which is most significant for severe foraminal narrowing on the left at L4/5.  No additional diagnostics at this time    Physical Therapy and Rehabilitation:     -He completed physical therapy recently with some improvement.  He should maintain his regular home exercise instruction program    Psychologically:  -No needs at this time    Medications  - Continue OTC Ibuprofen.  Consider trial of alternative NSAID in the future    Duration  - 1.5 months    Interventions:  -I suspect his pain is primarily due to lumbar radiculopathy in the setting of severe foraminal narrowing on the left at L4/5.  He initially underwent a left transforaminal ELOINA at L4/5 with 80% improvement in his pain for around 2-3 months. He underwent a repeat injection with particulate steroid to see if this provided more durable " pain relief but it only helped for a few days. Given this, as well as some concern for left foot weakness I recommend he return to Dr. Gonzalez to proceed with foraminotomy        Sincerely,  Andrzej Stahl MD  [facility] [place]

## 2024-05-29 ENCOUNTER — OFFICE VISIT (OUTPATIENT)
Dept: ORTHOPEDIC SURGERY | Facility: CLINIC | Age: 64
End: 2024-05-29
Payer: MEDICARE

## 2024-05-29 ENCOUNTER — PREP FOR PROCEDURE (OUTPATIENT)
Dept: ORTHOPEDIC SURGERY | Facility: CLINIC | Age: 64
End: 2024-05-29

## 2024-05-29 DIAGNOSIS — M99.63 OSSEOUS AND SUBLUXATION STENOSIS OF INTERVERTEBRAL FORAMINA OF LUMBAR REGION: ICD-10-CM

## 2024-05-29 DIAGNOSIS — M54.16 LUMBAR RADICULOPATHY, CHRONIC: Primary | ICD-10-CM

## 2024-05-29 DIAGNOSIS — M54.16 LUMBAR RADICULOPATHY: Primary | ICD-10-CM

## 2024-05-29 PROCEDURE — 99214 OFFICE O/P EST MOD 30 MIN: CPT | Performed by: ORTHOPAEDIC SURGERY

## 2024-05-29 PROCEDURE — 1159F MED LIST DOCD IN RCRD: CPT | Performed by: ORTHOPAEDIC SURGERY

## 2024-05-29 PROCEDURE — 1036F TOBACCO NON-USER: CPT | Performed by: ORTHOPAEDIC SURGERY

## 2024-05-29 PROCEDURE — 1125F AMNT PAIN NOTED PAIN PRSNT: CPT | Performed by: ORTHOPAEDIC SURGERY

## 2024-05-29 PROCEDURE — 1160F RVW MEDS BY RX/DR IN RCRD: CPT | Performed by: ORTHOPAEDIC SURGERY

## 2024-05-29 RX ORDER — CEFAZOLIN SODIUM 2 G/100ML
2 INJECTION, SOLUTION INTRAVENOUS ONCE
OUTPATIENT
Start: 2024-05-29 | End: 2024-05-29

## 2024-05-29 RX ORDER — POVIDONE-IODINE 10 MG/G
1 OINTMENT TOPICAL ONCE
OUTPATIENT
Start: 2024-05-29

## 2024-05-29 ASSESSMENT — PAIN SCALES - GENERAL: PAINLEVEL_OUTOF10: 3

## 2024-05-29 ASSESSMENT — PAIN - FUNCTIONAL ASSESSMENT: PAIN_FUNCTIONAL_ASSESSMENT: 0-10

## 2024-05-29 NOTE — H&P (VIEW-ONLY)
History Of Present Illness  Alfa Cast is a 63 y.o. male presenting with  left lumbar radiculopathy secondary to L4-L5 severe foraminal narrowing from hypertrophic facet and disc protrusion in the foramen.  He was last seen in my clinic December 2023 where we reviewed the MRI findings.  He has a history of prostate cancer status post prostatectomy 2-1/2 years ago at [facility] with history of residual urinary incontinence.  He continues to complain of with pain radiating down into his left buttock and left posterior lateral thigh and shin to his top of his left foot.  He is otherwise neurologically intact.  At the last visit we had discussed conservative versus surgical management and we had agreed to try some conservative management.  He underwent a course of physical therapy he also had 2 epidural steroid injection the first 1 was in January 2024 which provided a few months of relief.  His pain then started to come back and he went for a second injection recently on May 10, 2024 both of which were L4-L5 transforaminal injection.  He states the second injection only lasted about 1 and half weeks.  During that time he was also doing a lot of activities he was traveling with his family he also was marching in at a [employer] which seems to have exacerbated his radicular leg pain.  He had a lot of pain when he walked up and down the stairs and/or when he stepped down onto his left leg he also had some episodes of paresthesias down the left leg that came and went.  Continues to deny any new bowel or bladder disturbances.  At this point he feels that he is interested in surgical intervention.  He feels that he is exhausted all conservative treatments..     Past Medical History  He has a past medical history of Allergic contact dermatitis (07/29/2016), Arthritis, Chronic pain disorder, GERD (gastroesophageal reflux disease), Impotence (06/02/2015), Prostate cancer (Multi), and URI (upper respiratory  infection) (06/27/2023).    Surgical History  He has a past surgical history that includes Prostate surgery (2021); Shoulder surgery (Right); and Hernia repair.     Social History  He reports that he quit smoking about 16 months ago. His smoking use included cigarettes and cigars. He started smoking about 46 years ago. He has been exposed to tobacco smoke. He has never used smokeless tobacco. He reports current alcohol use of about 6.0 standard drinks of alcohol per week. He reports that he does not use drugs.    Family History  Family History   Problem Relation Name Age of Onset    Brain cancer Mother          Allergies  Penicillins and Bupropion    Review of Systems     Physical Exam   General: Patient appears well-nourished and well-developed in no acute distress, Alert and Oriented x3  Psych: Pleasant mood and affect  HEENT: Extraocular muscles intact, pupils equal and round. Sclerae anicteric   Cardio: extremities warm and well perfused  Resp: unlabored symmetric breathing  Skin: no open wounds or rash  Musculoskeletal/Neuro Exam: Walks leaning forward with a cane.  Mild tenderness to palpation along the left flank.  Positive straight leg raise on the left negative on the right tight hamstrings bilaterally.      Lower extremity     Motor: Right leg with 5 out of 5 motor strength with hip flexion, knee extension, ankle dorsiflexion plantarflexion and EHL against resistance.  Left leg with 4 out of 5 motor strength with hip flexion, knee extension limited by pain, 5 out of 5 ankle dorsiflexion plantarflexion EHL against resistance  Sensation to light touch intact along L2 to S1 distribution bilaterally but diminished on the left compared to right in all distribution     Last Recorded Vitals  There were no vitals taken for this visit.    Relevant Results    I reviewed x-rays obtained in clinic on November 29, 2023.  AP lateral flexion-extension views.  He has mild curvature at the thoracolumbar junction.  On  lateral view he has degenerative changes with endplate sclerosis between L1-L2 L2-L3 with some compression involving the superior endplate of L3 no dynamic instability with flexion extension.     I reviewed the CT scan of the lumbar spine from November 24, 2023 which shows mild compression of the L3 superior endplate with no retropulsed bone of unknown chronicity but degenerative changes in the lumbar spine similar to the x-ray.        I reviewed the MRI of the lumbar spine from December 19, 2023 which shows multilevel degenerative changes but severe bilateral foraminal stenosis at L4-L5 left worse than right with mild to moderate central canal stenosis.  Otherwise the fracture at L3 appears to be chronic.  It does not light up on STIR.  No evidence of metastatic lesions.  There is incidental finding of left kidney cyst which patient is aware of.  Scheduled medications    Continuous medications    PRN medications    No results found for this or any previous visit (from the past 24 hour(s)).    Assessment/Plan   Alfa Cast is a 63 y.o. year old male patient with ongoing left back pain with left radicular leg pain  since Thanksgiving of 2023.  MRI shows foraminal stenosis at L4-L5.  Left worse than right from hypertrophic facet and disc bulge within the foramen on the left.  I reviewed the images with him in clinic again today.  I again discussed conservative versus operative management.  Conservative options include continuing physical therapy, transforaminal epidural steroid injection at L4-L5, medication management.  Surgery would involve a L4-L5 laminectomy decompression with complete facetectomy at L4-L5 on the left side to decompress the foramen and neural elements.  This would create iatrogenic instability and therefore he would need a posterior spine instrumented fusion at L4-L5 with an L4-L5 transforaminal lumbar interbody fusion to decompress the foramen as well as provide bony surface for fusion.  The goal  of surgery would be to help relieve the nerve compression and hopefully help with the radicular leg pain.  He does have degenerative changes in his lower back and his prior compression fractures so I cannot guarantee that this will get rid of all his back pain.     I discussed the risks of surgery which includes but not limited to infection at the surgical site or wound healing requiring additional surgery to clean the infection and long term IV antibiotics. There is risk of blood loss requiring blood transfusion. Risk of dural tear requiring repair and possible continue cerebral spinal fluid leakage and positional headaches. Risk of Nerve root injury resulting in temporary or permeant weakness, numbness, or radicular pain. Risk of epidural hematoma and spinal cord compression resulting in weakness in extremity and bowel and bladder disturbances requiring additional surgery to clear out hematoma. Risk of nonunion or hardware failure requiring additional surgery to correct this. Risk of adjacent level disease in the future requiring additional surgery in the future to address this. Risk of needing to remain intubated after surgery for facial swelling. Risk of blindness if need to be in a prone position for surgery. Other complications include skin breakdown or skin blistering from being prone for long hours, developing MI, stroke, DVT, PE during or after surgery. Risk of death. Risk of complications from anesthesia requiring prolong intubation. Risk of shimon coronavirus and the complications associated with that while in the hospital.      After our discussion patient elected to proceed with surgery.  Written consent was obtained in clinic.  All questions were answered.  He will need preadmission testing prior to surgery.      After our discussion, the patient articulated understanding of the plan and felt that all questions had been answered satisfactorily. The patient was pleased with the visit and very  appreciative for the care rendered.     **Please excuse any errors in grammar or translation related to this dictation. Voice recognition software was utilized to prepare this document. **          Honey Gonzalez MD

## 2024-05-29 NOTE — PROGRESS NOTES
S: Alfa Cast is a 63 y.o. year old male patient who is here to follow up for left lumbar radiculopathy secondary to L4-L5 severe foraminal narrowing from hypertrophic facet and disc protrusion in the foramen.  He was last seen in my clinic December 2023 where we reviewed the MRI findings.  He has a history of prostate cancer status post prostatectomy 2-1/2 years ago at [facility] with history of residual urinary incontinence.  He continues to complain of with pain radiating down into his left buttock and left posterior lateral thigh and shin to his top of his left foot.  He is otherwise neurologically intact.  At the last visit we had discussed conservative versus surgical management and we had agreed to try some conservative management.  He underwent a course of physical therapy he also had 2 epidural steroid injection the first 1 was in January 2024 which provided a few months of relief.  His pain then started to come back and he went for a second injection recently on May 10, 2024 both of which were L4-L5 transforaminal injection.  He states the second injection only lasted about 1 and half weeks.  During that time he was also doing a lot of activities he was traveling with his family he also was marching in at a [employer] which seems to have exacerbated his radicular leg pain.  He had a lot of pain when he walked up and down the stairs and/or when he stepped down onto his left leg he also had some episodes of paresthesias down the left leg that came and went.  Continues to deny any new bowel or bladder disturbances.  At this point he feels that he is interested in surgical intervention.  He feels that he is exhausted all conservative treatments.      O:     General: Patient appears well-nourished and well-developed in no acute distress, Alert and Oriented x3  Psych: Pleasant mood and affect  HEENT: Extraocular muscles intact, pupils equal and round. Sclerae anicteric   Cardio: extremities  warm and well perfused  Resp: unlabored symmetric breathing  Skin: no open wounds or rash  Musculoskeletal/Neuro Exam: Walks leaning forward with a cane.  Mild tenderness to palpation along the left flank.  Positive straight leg raise on the left negative on the right tight hamstrings bilaterally.      Lower extremity     Motor: Right leg with 5 out of 5 motor strength with hip flexion, knee extension, ankle dorsiflexion plantarflexion and EHL against resistance.  Left leg with 4 out of 5 motor strength with hip flexion, knee extension limited by pain, 5 out of 5 ankle dorsiflexion plantarflexion EHL against resistance  Sensation to light touch intact along L2 to S1 distribution bilaterally but diminished on the left compared to right in all distribution          Imaging:    I reviewed x-rays obtained in clinic on November 29, 2023.  AP lateral flexion-extension views.  He has mild curvature at the thoracolumbar junction.  On lateral view he has degenerative changes with endplate sclerosis between L1-L2 L2-L3 with some compression involving the superior endplate of L3 no dynamic instability with flexion extension.     I reviewed the CT scan of the lumbar spine from November 24, 2023 which shows mild compression of the L3 superior endplate with no retropulsed bone of unknown chronicity but degenerative changes in the lumbar spine similar to the x-ray.      I reviewed the MRI of the lumbar spine from December 19, 2023 which shows multilevel degenerative changes but severe bilateral foraminal stenosis at L4-L5 left worse than right with mild to moderate central canal stenosis.  Otherwise the fracture at L3 appears to be chronic.  It does not light up on STIR.  No evidence of metastatic lesions.  There is incidental finding of left kidney cyst which patient is aware of.      A/P: Alfa Cast is a 63 y.o. year old male patient with ongoing left back pain with left radicular leg pain  since Thanksgiving of 2023.  MRI  shows foraminal stenosis at L4-L5.  Left worse than right from hypertrophic facet and disc bulge within the foramen on the left.  I reviewed the images with him in clinic again today.  I again discussed conservative versus operative management.  Conservative options include continuing physical therapy, transforaminal epidural steroid injection at L4-L5, medication management.  Surgery would involve a L4-L5 laminectomy decompression with complete facetectomy at L4-L5 on the left side to decompress the foramen and neural elements.  This would create iatrogenic instability and therefore he would need a posterior spine instrumented fusion at L4-L5 with an L4-L5 transforaminal lumbar interbody fusion to decompress the foramen as well as provide bony surface for fusion.  The goal of surgery would be to help relieve the nerve compression and hopefully help with the radicular leg pain.  He does have degenerative changes in his lower back and his prior compression fractures so I cannot guarantee that this will get rid of all his back pain.    I discussed the risks of surgery which includes but not limited to infection at the surgical site or wound healing requiring additional surgery to clean the infection and long term IV antibiotics. There is risk of blood loss requiring blood transfusion. Risk of dural tear requiring repair and possible continue cerebral spinal fluid leakage and positional headaches. Risk of Nerve root injury resulting in temporary or permeant weakness, numbness, or radicular pain. Risk of epidural hematoma and spinal cord compression resulting in weakness in extremity and bowel and bladder disturbances requiring additional surgery to clear out hematoma. Risk of nonunion or hardware failure requiring additional surgery to correct this. Risk of adjacent level disease in the future requiring additional surgery in the future to address this. Risk of needing to remain intubated after surgery for facial  swelling. Risk of blindness if need to be in a prone position for surgery. Other complications include skin breakdown or skin blistering from being prone for long hours, developing MI, stroke, DVT, PE during or after surgery. Risk of death. Risk of complications from anesthesia requiring prolong intubation. Risk of shimon coronavirus and the complications associated with that while in the hospital.     After our discussion patient elected to proceed with surgery.  Written consent was obtained in clinic.  All questions were answered.  He will need preadmission testing prior to surgery.     After our discussion, the patient articulated understanding of the plan and felt that all questions had been answered satisfactorily. The patient was pleased with the visit and very appreciative for the care rendered.    **Please excuse any errors in grammar or translation related to this dictation. Voice recognition software was utilized to prepare this document. **                    Honey Gonzalez MD    Department of Orthopaedic Surgery  [place]  [email or web address]

## 2024-05-29 NOTE — H&P
History Of Present Illness  Alfa Cast is a 63 y.o. male presenting with  left lumbar radiculopathy secondary to L4-L5 severe foraminal narrowing from hypertrophic facet and disc protrusion in the foramen.  He was last seen in my clinic December 2023 where we reviewed the MRI findings.  He has a history of prostate cancer status post prostatectomy 2-1/2 years ago at [facility] with history of residual urinary incontinence.  He continues to complain of with pain radiating down into his left buttock and left posterior lateral thigh and shin to his top of his left foot.  He is otherwise neurologically intact.  At the last visit we had discussed conservative versus surgical management and we had agreed to try some conservative management.  He underwent a course of physical therapy he also had 2 epidural steroid injection the first 1 was in January 2024 which provided a few months of relief.  His pain then started to come back and he went for a second injection recently on May 10, 2024 both of which were L4-L5 transforaminal injection.  He states the second injection only lasted about 1 and half weeks.  During that time he was also doing a lot of activities he was traveling with his family he also was marching in at a [employer] which seems to have exacerbated his radicular leg pain.  He had a lot of pain when he walked up and down the stairs and/or when he stepped down onto his left leg he also had some episodes of paresthesias down the left leg that came and went.  Continues to deny any new bowel or bladder disturbances.  At this point he feels that he is interested in surgical intervention.  He feels that he is exhausted all conservative treatments..     Past Medical History  He has a past medical history of Allergic contact dermatitis (07/29/2016), Arthritis, Chronic pain disorder, GERD (gastroesophageal reflux disease), Impotence (06/02/2015), Prostate cancer (Multi), and URI (upper respiratory  infection) (06/27/2023).    Surgical History  He has a past surgical history that includes Prostate surgery (2021); Shoulder surgery (Right); and Hernia repair.     Social History  He reports that he quit smoking about 16 months ago. His smoking use included cigarettes and cigars. He started smoking about 46 years ago. He has been exposed to tobacco smoke. He has never used smokeless tobacco. He reports current alcohol use of about 6.0 standard drinks of alcohol per week. He reports that he does not use drugs.    Family History  Family History   Problem Relation Name Age of Onset    Brain cancer Mother          Allergies  Penicillins and Bupropion    Review of Systems     Physical Exam   General: Patient appears well-nourished and well-developed in no acute distress, Alert and Oriented x3  Psych: Pleasant mood and affect  HEENT: Extraocular muscles intact, pupils equal and round. Sclerae anicteric   Cardio: extremities warm and well perfused  Resp: unlabored symmetric breathing  Skin: no open wounds or rash  Musculoskeletal/Neuro Exam: Walks leaning forward with a cane.  Mild tenderness to palpation along the left flank.  Positive straight leg raise on the left negative on the right tight hamstrings bilaterally.      Lower extremity     Motor: Right leg with 5 out of 5 motor strength with hip flexion, knee extension, ankle dorsiflexion plantarflexion and EHL against resistance.  Left leg with 4 out of 5 motor strength with hip flexion, knee extension limited by pain, 5 out of 5 ankle dorsiflexion plantarflexion EHL against resistance  Sensation to light touch intact along L2 to S1 distribution bilaterally but diminished on the left compared to right in all distribution     Last Recorded Vitals  There were no vitals taken for this visit.    Relevant Results    I reviewed x-rays obtained in clinic on November 29, 2023.  AP lateral flexion-extension views.  He has mild curvature at the thoracolumbar junction.  On  lateral view he has degenerative changes with endplate sclerosis between L1-L2 L2-L3 with some compression involving the superior endplate of L3 no dynamic instability with flexion extension.     I reviewed the CT scan of the lumbar spine from November 24, 2023 which shows mild compression of the L3 superior endplate with no retropulsed bone of unknown chronicity but degenerative changes in the lumbar spine similar to the x-ray.        I reviewed the MRI of the lumbar spine from December 19, 2023 which shows multilevel degenerative changes but severe bilateral foraminal stenosis at L4-L5 left worse than right with mild to moderate central canal stenosis.  Otherwise the fracture at L3 appears to be chronic.  It does not light up on STIR.  No evidence of metastatic lesions.  There is incidental finding of left kidney cyst which patient is aware of.  Scheduled medications    Continuous medications    PRN medications    No results found for this or any previous visit (from the past 24 hour(s)).    Assessment/Plan   Alfa Cast is a 63 y.o. year old male patient with ongoing left back pain with left radicular leg pain  since Thanksgiving of 2023.  MRI shows foraminal stenosis at L4-L5.  Left worse than right from hypertrophic facet and disc bulge within the foramen on the left.  I reviewed the images with him in clinic again today.  I again discussed conservative versus operative management.  Conservative options include continuing physical therapy, transforaminal epidural steroid injection at L4-L5, medication management.  Surgery would involve a L4-L5 laminectomy decompression with complete facetectomy at L4-L5 on the left side to decompress the foramen and neural elements.  This would create iatrogenic instability and therefore he would need a posterior spine instrumented fusion at L4-L5 with an L4-L5 transforaminal lumbar interbody fusion to decompress the foramen as well as provide bony surface for fusion.  The goal  of surgery would be to help relieve the nerve compression and hopefully help with the radicular leg pain.  He does have degenerative changes in his lower back and his prior compression fractures so I cannot guarantee that this will get rid of all his back pain.     I discussed the risks of surgery which includes but not limited to infection at the surgical site or wound healing requiring additional surgery to clean the infection and long term IV antibiotics. There is risk of blood loss requiring blood transfusion. Risk of dural tear requiring repair and possible continue cerebral spinal fluid leakage and positional headaches. Risk of Nerve root injury resulting in temporary or permeant weakness, numbness, or radicular pain. Risk of epidural hematoma and spinal cord compression resulting in weakness in extremity and bowel and bladder disturbances requiring additional surgery to clear out hematoma. Risk of nonunion or hardware failure requiring additional surgery to correct this. Risk of adjacent level disease in the future requiring additional surgery in the future to address this. Risk of needing to remain intubated after surgery for facial swelling. Risk of blindness if need to be in a prone position for surgery. Other complications include skin breakdown or skin blistering from being prone for long hours, developing MI, stroke, DVT, PE during or after surgery. Risk of death. Risk of complications from anesthesia requiring prolong intubation. Risk of shimon coronavirus and the complications associated with that while in the hospital.      After our discussion patient elected to proceed with surgery.  Written consent was obtained in clinic.  All questions were answered.  He will need preadmission testing prior to surgery.      After our discussion, the patient articulated understanding of the plan and felt that all questions had been answered satisfactorily. The patient was pleased with the visit and very  appreciative for the care rendered.     **Please excuse any errors in grammar or translation related to this dictation. Voice recognition software was utilized to prepare this document. **          Honey Gonzalez MD

## 2024-06-06 ENCOUNTER — PRE-ADMISSION TESTING (OUTPATIENT)
Dept: PREADMISSION TESTING | Facility: HOSPITAL | Age: 64
End: 2024-06-06
Payer: MEDICARE

## 2024-06-06 VITALS
WEIGHT: 204.5 LBS | HEART RATE: 81 BPM | SYSTOLIC BLOOD PRESSURE: 130 MMHG | HEIGHT: 70 IN | DIASTOLIC BLOOD PRESSURE: 70 MMHG | BODY MASS INDEX: 29.28 KG/M2 | TEMPERATURE: 98.8 F | OXYGEN SATURATION: 97 %

## 2024-06-06 DIAGNOSIS — M54.16 LUMBAR RADICULOPATHY, CHRONIC: ICD-10-CM

## 2024-06-06 DIAGNOSIS — Z01.818 PREOPERATIVE EXAMINATION: Primary | ICD-10-CM

## 2024-06-06 DIAGNOSIS — R79.89 OTHER SPECIFIED ABNORMAL FINDINGS OF BLOOD CHEMISTRY: ICD-10-CM

## 2024-06-06 LAB
ABO GROUP (TYPE) IN BLOOD: NORMAL
ANION GAP SERPL CALC-SCNC: 15 MMOL/L (ref 10–20)
ANTIBODY SCREEN: NORMAL
APPEARANCE UR: CLEAR
BACTERIA #/AREA URNS AUTO: ABNORMAL /HPF
BILIRUB UR STRIP.AUTO-MCNC: NEGATIVE MG/DL
BUN SERPL-MCNC: 15 MG/DL (ref 6–23)
CALCIUM SERPL-MCNC: 9.8 MG/DL (ref 8.6–10.6)
CHLORIDE SERPL-SCNC: 100 MMOL/L (ref 98–107)
CO2 SERPL-SCNC: 27 MMOL/L (ref 21–32)
COLOR UR: YELLOW
CREAT SERPL-MCNC: 0.66 MG/DL (ref 0.5–1.3)
EGFRCR SERPLBLD CKD-EPI 2021: >90 ML/MIN/1.73M*2
ERYTHROCYTE [DISTWIDTH] IN BLOOD BY AUTOMATED COUNT: 14.6 % (ref 11.5–14.5)
EST. AVERAGE GLUCOSE BLD GHB EST-MCNC: 111 MG/DL
GLUCOSE SERPL-MCNC: 90 MG/DL (ref 74–99)
GLUCOSE UR STRIP.AUTO-MCNC: NORMAL MG/DL
HBA1C MFR BLD: 5.5 %
HCT VFR BLD AUTO: 39.8 % (ref 41–52)
HGB BLD-MCNC: 13.5 G/DL (ref 13.5–17.5)
KETONES UR STRIP.AUTO-MCNC: NEGATIVE MG/DL
LEUKOCYTE ESTERASE UR QL STRIP.AUTO: NEGATIVE
MCH RBC QN AUTO: 30.4 PG (ref 26–34)
MCHC RBC AUTO-ENTMCNC: 33.9 G/DL (ref 32–36)
MCV RBC AUTO: 90 FL (ref 80–100)
MUCOUS THREADS #/AREA URNS AUTO: ABNORMAL /LPF
NITRITE UR QL STRIP.AUTO: ABNORMAL
NRBC BLD-RTO: 0 /100 WBCS (ref 0–0)
PH UR STRIP.AUTO: 6 [PH]
PLATELET # BLD AUTO: 210 X10*3/UL (ref 150–450)
POTASSIUM SERPL-SCNC: 4.2 MMOL/L (ref 3.5–5.3)
PROT UR STRIP.AUTO-MCNC: ABNORMAL MG/DL
RBC # BLD AUTO: 4.44 X10*6/UL (ref 4.5–5.9)
RBC # UR STRIP.AUTO: NEGATIVE /UL
RBC #/AREA URNS AUTO: ABNORMAL /HPF
RH FACTOR (ANTIGEN D): NORMAL
SODIUM SERPL-SCNC: 138 MMOL/L (ref 136–145)
SP GR UR STRIP.AUTO: 1.02
UROBILINOGEN UR STRIP.AUTO-MCNC: NORMAL MG/DL
WBC # BLD AUTO: 7.7 X10*3/UL (ref 4.4–11.3)
WBC #/AREA URNS AUTO: ABNORMAL /HPF

## 2024-06-06 PROCEDURE — 80048 BASIC METABOLIC PNL TOTAL CA: CPT | Performed by: ORTHOPAEDIC SURGERY

## 2024-06-06 PROCEDURE — 85027 COMPLETE CBC AUTOMATED: CPT | Performed by: ORTHOPAEDIC SURGERY

## 2024-06-06 PROCEDURE — 99204 OFFICE O/P NEW MOD 45 MIN: CPT | Performed by: NURSE PRACTITIONER

## 2024-06-06 PROCEDURE — 87081 CULTURE SCREEN ONLY: CPT | Mod: 59 | Performed by: NURSE PRACTITIONER

## 2024-06-06 PROCEDURE — 83036 HEMOGLOBIN GLYCOSYLATED A1C: CPT | Performed by: NURSE PRACTITIONER

## 2024-06-06 PROCEDURE — 36415 COLL VENOUS BLD VENIPUNCTURE: CPT

## 2024-06-06 PROCEDURE — 81001 URINALYSIS AUTO W/SCOPE: CPT | Performed by: ORTHOPAEDIC SURGERY

## 2024-06-06 PROCEDURE — 86900 BLOOD TYPING SEROLOGIC ABO: CPT | Performed by: ORTHOPAEDIC SURGERY

## 2024-06-06 PROCEDURE — 87086 URINE CULTURE/COLONY COUNT: CPT | Performed by: ORTHOPAEDIC SURGERY

## 2024-06-06 RX ORDER — ASPIRIN 81 MG/1
81 TABLET ORAL DAILY
COMMUNITY

## 2024-06-06 RX ORDER — CHLORHEXIDINE GLUCONATE 40 MG/ML
SOLUTION TOPICAL 2 TIMES DAILY
Qty: 473 ML | Refills: 0 | Status: SHIPPED | OUTPATIENT
Start: 2024-06-06 | End: 2024-06-11

## 2024-06-06 RX ORDER — PREDNISONE 20 MG/1
20 TABLET ORAL DAILY
COMMUNITY
Start: 2024-06-04

## 2024-06-06 RX ORDER — CHLORHEXIDINE GLUCONATE ORAL RINSE 1.2 MG/ML
SOLUTION DENTAL
Qty: 473 ML | Refills: 0 | Status: SHIPPED | OUTPATIENT
Start: 2024-06-06

## 2024-06-06 ASSESSMENT — DUKE ACTIVITY SCORE INDEX (DASI)
CAN YOU DO HEAVY WORK AROUND THE HOUSE LIKE SCRUBBING FLOORS OR LIFTING AND MOVING HEAVY FURNITURE: NO
CAN YOU RUN A SHORT DISTANCE: NO
CAN YOU DO YARD WORK LIKE RAKING LEAVES, WEEDING OR PUSHING A MOWER: NO
CAN YOU PARTICIPATE IN MODERATE RECREATIONAL ACTIVITIES LIKE GOLF, BOWLING, DANCING, DOUBLES TENNIS OR THROWING A BASEBALL OR FOOTBALL: NO
DASI METS SCORE: 4.4
CAN YOU HAVE SEXUAL RELATIONS: NO
CAN YOU WALK INDOORS, SUCH AS AROUND YOUR HOUSE: YES
CAN YOU PARTICIPATE IN STRENOUS SPORTS LIKE SWIMMING, SINGLES TENNIS, FOOTBALL, BASKETBALL, OR SKIING: NO
CAN YOU CLIMB A FLIGHT OF STAIRS OR WALK UP A HILL: NO
CAN YOU DO MODERATE WORK AROUND THE HOUSE LIKE VACUUMING, SWEEPING FLOORS OR CARRYING GROCERIES: YES
TOTAL_SCORE: 13.45
CAN YOU DO LIGHT WORK AROUND THE HOUSE LIKE DUSTING OR WASHING DISHES: YES
CAN YOU WALK A BLOCK OR TWO ON LEVEL GROUND: YES
CAN YOU TAKE CARE OF YOURSELF (EAT, DRESS, BATHE, OR USE TOILET): YES

## 2024-06-06 ASSESSMENT — ENCOUNTER SYMPTOMS
EYES NEGATIVE: 1
GASTROINTESTINAL NEGATIVE: 1
RESPIRATORY NEGATIVE: 1
NECK NEGATIVE: 1
ENDOCRINE NEGATIVE: 1
CONSTITUTIONAL NEGATIVE: 1
CARDIOVASCULAR NEGATIVE: 1

## 2024-06-06 ASSESSMENT — LIFESTYLE VARIABLES: SMOKING_STATUS: NONSMOKER

## 2024-06-06 NOTE — CPM/PAT H&P
CPM/PAT Evaluation       Name: Alfa Cast (Alfa Cast)  /Age: 1960/64 y.o.     Visit Type:   In-Person       Chief Complaint: Lumbar spinal stenosis and radiculopathy scheduled for surgery    HPI: Patient is a 64-year-old male scheduled for posterior lumbar spinal fusion on 2024 for treatment of lumbar spinal stenosis with lumbar radiculopathy.  The patient is referred by Dr. Honey Gonzalez for preoperative evaluation of allergic contact dermatitis with recent prednisone burst dose, osteoarthritis, lumbar spinal stenosis with radiculopathy scheduled for surgery, GERD, hyperlipidemia, prostate cancer status post prostatectomy managed on tadalafil.    Past Medical History:   Diagnosis Date    Allergic contact dermatitis 2016    Arthritis     Chronic pain disorder     GERD (gastroesophageal reflux disease)     Hyperlipidemia     Impotence 2015    Prostate cancer (Multi)     URI (upper respiratory infection) 2023       Past Surgical History:   Procedure Laterality Date    HERNIA REPAIR      unilateral inguinal hernia repair at around age 6 and around age 46    PROSTATE SURGERY      Prostatectomy    SHOULDER SURGERY Right     Shoulder reconstruction       Patient  has no history on file for sexual activity.    Family History   Problem Relation Name Age of Onset    Brain cancer Mother         Allergies   Allergen Reactions    Penicillins Unknown    Bupropion Rash       Prior to Admission medications    Medication Sig Start Date End Date Taking? Authorizing Provider   aspirin 81 mg EC tablet Take 1 tablet (81 mg) by mouth once daily.   Yes Historical Provider, MD   atorvastatin (Lipitor) 80 mg tablet Take 1 tablet (80 mg) by mouth once daily. 3/6/24 3/6/25 Yes Wilbur Cyr MD   omeprazole (PriLOSEC) 20 mg DR capsule Take 1 capsule (20 mg) by mouth. Do not crush or chew.   Yes Historical Provider, MD   predniSONE (Deltasone) 20 mg tablet Take 1 tablet (20 mg) by mouth once  daily. 6/4/24  Yes Historical Provider, MD   tadalafil (Cialis) 5 mg tablet Take 1 tablet (5 mg) by mouth once daily. 1/31/22  Yes Historical Provider, MD   albuterol (Ventolin HFA) 90 mcg/actuation inhaler Inhale 2 puffs every 4 hours if needed for wheezing or shortness of breath. 3/11/24 3/11/25  CRISTEL Buckley   chlorhexidine (Hibiclens) 4 % external liquid Apply topically 2 times a day for 5 days. 6/6/24 6/11/24  Samantha A Meeson, APRN-CNP   chlorhexidine (Peridex) 0.12 % solution Swish and spit 15 mL night before surgery and morning of surgery 6/6/24   Samantha A Meeson, APRN-CNP   methocarbamol (Robaxin) 500 mg tablet Take 1 tablet (500 mg) by mouth 3 times a day as needed for muscle spasms. 11/29/23 1/3/24  Honey Gonzalez MD   multivitamin tablet Take 1 tablet by mouth once daily.    Historical Provider, MD        PAT ROS:   Constitutional:   neg    Neuro/Psych:    Left leg numbness and tingling  Eyes:   neg     use of corrective lenses  Ears:    tinnitus  Nose:   neg    Mouth:   neg    Throat:   neg    Neck:   neg    Cardio:   neg    Respiratory:   neg    Endocrine:   neg    GI:   neg    :   neg    Musculoskeletal:    Chronic low back pain  Hematologic:   neg    Skin:  neg        Physical Exam  Vitals reviewed.   Constitutional:       Appearance: Normal appearance.      Comments: mustache   HENT:      Head: Normocephalic.      Nose: Nose normal.      Mouth/Throat:      Mouth: Mucous membranes are moist.   Eyes:      Conjunctiva/sclera: Conjunctivae normal.   Neck:      Vascular: No carotid bruit.   Cardiovascular:      Rate and Rhythm: Normal rate and regular rhythm.      Pulses: Normal pulses.      Heart sounds: Normal heart sounds.   Pulmonary:      Effort: Pulmonary effort is normal.      Breath sounds: Normal breath sounds.   Abdominal:      Palpations: Abdomen is soft.      Tenderness: There is no abdominal tenderness.   Musculoskeletal:         General: Normal range of motion.       Cervical back: Normal range of motion.      Right lower leg: No edema.      Left lower leg: No edema.   Lymphadenopathy:      Cervical: No cervical adenopathy.   Skin:     General: Skin is warm and dry.      Capillary Refill: Capillary refill takes less than 2 seconds.   Neurological:      General: No focal deficit present.      Mental Status: He is alert and oriented to person, place, and time.   Psychiatric:         Mood and Affect: Mood normal.         Behavior: Behavior normal. Behavior is cooperative.         Thought Content: Thought content normal.         Judgment: Judgment normal.          PAT AIRWAY:   Airway:     Mallampati::  I    Neck ROM::  Full  normal        Visit Vitals  /70   Pulse 81   Temp 37.1 °C (98.8 °F)       DASI Risk Score      Flowsheet Row Most Recent Value   DASI SCORE 13.45   METS Score (Will be calculated only when all the questions are answered) 4.4          Caprini DVT Assessment      Flowsheet Row Most Recent Value   DVT Score 10   Current Status Major surgery planned, lasting over 3 hours   History Previous malignancy   Age 60-75 years   BMI 30 or less          Modified Frailty Index      Flowsheet Row Most Recent Value   Modified Frailty Index Calculator 0          CHADS2 Stroke Risk  Current as of 4 hours ago        N/A 3 to 100%: High Risk   2 to < 3%: Medium Risk   0 to < 2%: Low Risk     Last Change: N/A          This score determines the patient's risk of having a stroke if the patient has atrial fibrillation.        This score is not applicable to this patient. Components are not calculated.          Revised Cardiac Risk Index      Flowsheet Row Most Recent Value   Revised Cardiac Risk Calculator 0          Apfel Simplified Score      Flowsheet Row Most Recent Value   Apfel Simplified Score Calculator 2          Risk Analysis Index Results This Encounter    No data found in the last 1 encounters.       Stop Bang Score      Flowsheet Row Most Recent Value   Do you snore  loudly? 0   Do you often feel tired or fatigued after your sleep? 0   Has anyone ever observed you stop breathing in your sleep? 0   Do you have or are you being treated for high blood pressure? 0   Recent BMI (Calculated) 29.7   Is BMI greater than 35 kg/m2? 0=No   Age older than 50 years old? 1=Yes   Is your neck circumference greater than 17 inches (Male) or 16 inches (Female)? 0   Gender - Male 1=Yes   STOP-BANG Total Score 2            Assessment and Plan:   Neuro:  No neurologic diagnoses, however, the patient is at an increased risk for post operative delirium secondary to age >/= 65 and type and duration of surgery.  Preoperative brain exercise educational handout provided to patient.    The patient is at an increased risk for perioperative stroke secondary to increased age, HLD, and general anesthesia.     HEENT/Airway:  No diagnosis or significant findings on chart review or clinical presentation and evaluation.     Cardiovascular: Hyperlipidemia managed on atorvastatin (continue).  Nuclear stress test on 04/01/2024 negative for stress-induced ischemia.  TTE 04/02/2024 normal left ventricular systolic function with EF 55 to 60% and no regional wall motion abnormalities or significant valvular disease noted.  No additional preoperative testing is currently indicated.    METS are 4.4    RCRI  0 which is 3.9% 30 day risk of MACE (risk for cardiac death, nonfatal myocardial infarction, and nonfactal cardiac arrest    GINETTE score which indicates a 0.3% risk of intraoperative or 30-day postoperative MACE      Pulmonary: Patient has as needed albuterol inhaler that he has not had to use in greater than 3 months provided during a URI.  preoperative deep breathing educational handout provided to patient.    ARISCAT:   19   points which is a low (1.6%) risk of in-hospital post-op pulmonary complications     PRODIGY:  16  points which is a high risk of post op opioid induced respiratory depression episodes    STOP  BAN   points which is a low risk for moderate to severe WENDY    Renal: prostate cancer status post prostatectomy managed on tadalafil (hold 3 days).     Update 24: Urine culture positive for UTI, surgeon notified and she will treat accordingly.    The patient is at increased risk of perioperative renal complications secondary to age>/= 56 and male sex. Preventative measures include preoperative hydration.    Endocrine:  allergic contact dermatitis with recent prednisone burst dose- 5 day treatment with completion on 24- consider preoperative stress dosing.    Hematologic:   No diagnosis or significant findings on chart review or clinical presentation and evaluation however see below risk screening tool.  Preoperative DVT educational handout provided to patient.    Caprini Score:   10 points which is a highest risk of perioperative VTE    Gastrointestinal:   GERD managed on omeprazole (continue).    EAT-10 score of  0- self-perceived oropharyngeal dysphagia scale (0-40)     Apfel: 2 points 39% risk for post operative N/V    Infectious disease:  No diagnosis or significant findings on chart review or clinical presentation and evaluation.      Musculoskeletal:  osteoarthritis, lumbar spinal stenosis with radiculopathy scheduled for surgery         Labs ordered  Recent Results (from the past 168 hour(s))   Basic Metabolic Panel    Collection Time: 24  2:30 PM   Result Value Ref Range    Glucose 90 74 - 99 mg/dL    Sodium 138 136 - 145 mmol/L    Potassium 4.2 3.5 - 5.3 mmol/L    Chloride 100 98 - 107 mmol/L    Bicarbonate 27 21 - 32 mmol/L    Anion Gap 15 10 - 20 mmol/L    Urea Nitrogen 15 6 - 23 mg/dL    Creatinine 0.66 0.50 - 1.30 mg/dL    eGFR >90 >60 mL/min/1.73m*2    Calcium 9.8 8.6 - 10.6 mg/dL   CBC    Collection Time: 24  2:30 PM   Result Value Ref Range    WBC 7.7 4.4 - 11.3 x10*3/uL    nRBC 0.0 0.0 - 0.0 /100 WBCs    RBC 4.44 (L) 4.50 - 5.90 x10*6/uL    Hemoglobin 13.5 13.5 - 17.5  g/dL    Hematocrit 39.8 (L) 41.0 - 52.0 %    MCV 90 80 - 100 fL    MCH 30.4 26.0 - 34.0 pg    MCHC 33.9 32.0 - 36.0 g/dL    RDW 14.6 (H) 11.5 - 14.5 %    Platelets 210 150 - 450 x10*3/uL   Type And Screen    Collection Time: 06/06/24  2:30 PM   Result Value Ref Range    ABO TYPE A     Rh TYPE POS     ANTIBODY SCREEN NEG    Staphylococcus aureus/MRSA colonization, Culture    Collection Time: 06/06/24  2:30 PM    Specimen: Nares/Axilla/Groin; Swab   Result Value Ref Range    Staph/MRSA Screen Culture (A)      Isolated: Methicillin Susceptible Staphylococcus aureus (MSSA)   Hemoglobin A1C    Collection Time: 06/06/24  2:30 PM   Result Value Ref Range    Hemoglobin A1C 5.5 see below %    Estimated Average Glucose 111 Not Established mg/dL   Urinalysis with Reflex Culture and Microscopic    Collection Time: 06/06/24  2:30 PM   Result Value Ref Range    Color, Urine Yellow Light-Yellow, Yellow, Dark-Yellow    Appearance, Urine Clear Clear    Specific Gravity, Urine 1.025 1.005 - 1.035    pH, Urine 6.0 5.0, 5.5, 6.0, 6.5, 7.0, 7.5, 8.0    Protein, Urine 20 (TRACE) NEGATIVE, 10 (TRACE), 20 (TRACE) mg/dL    Glucose, Urine Normal Normal mg/dL    Blood, Urine NEGATIVE NEGATIVE    Ketones, Urine NEGATIVE NEGATIVE mg/dL    Bilirubin, Urine NEGATIVE NEGATIVE    Urobilinogen, Urine Normal Normal mg/dL    Nitrite, Urine 1+ (A) NEGATIVE    Leukocyte Esterase, Urine NEGATIVE NEGATIVE   Extra Urine Gray Tube    Collection Time: 06/06/24  2:30 PM   Result Value Ref Range    Extra Tube Hold for add-ons.    Microscopic Only, Urine    Collection Time: 06/06/24  2:30 PM   Result Value Ref Range    WBC, Urine 1-5 1-5, NONE /HPF    RBC, Urine 1-2 NONE, 1-2, 3-5 /HPF    Bacteria, Urine 4+ (A) NONE SEEN /HPF    Mucus, Urine 2+ Reference range not established. /LPF   Urine Culture    Collection Time: 06/06/24  2:30 PM    Specimen: Clean Catch/Voided; Urine   Result Value Ref Range    Urine Culture >100,000 Escherichia coli (A)         Susceptibility    Escherichia coli - MICROSCAN     Ampicillin  Susceptible mcg/mL     Cefazolin  Susceptible mcg/mL     Cefazolin (uncomplicated UTIs only)  Susceptible mcg/mL     Ciprofloxacin  Susceptible mcg/mL     Gentamicin  Susceptible mcg/mL     Nitrofurantoin  Susceptible mcg/mL     Piperacillin/Tazobactam  Susceptible mcg/mL     Trimethoprim/Sulfamethoxazole  Susceptible mcg/mL

## 2024-06-06 NOTE — PREPROCEDURE INSTRUCTIONS
Thank you for visiting [facility] ([facility]) today for your pre-procedure evaluation, you were seen by     Samantha Meeson, MSN, NP-C  Adult-Gerontology Nurse Practitioner II  Department of Anesthesiology and Perioperative Medicine  Main phone [phone]  Direct phone [phone]  Fax [phone]     This summary includes instructions and information to aid you during your perioperative period.  Please read carefully. If you have any questions about your visit today, please call the number listed above.  If you become ill or have any changes to your health before your surgery, please contact your primary care provider and alert your surgeon.    Preparing for your Surgery       Exercises  Preoperative Deep Breathing Exercises  Why it is important to do deep breathing exercises before my surgery?  Deep breathing exercises strengthen your breathing muscles.  This helps you to recover after your surgery and decreases the chance of breathing complications.  How are the deep breathing exercises done?  Sit straight with your back supported.  Breathe in deeply and slowly through your nose. Your lower rib cage should expand and your abdomen may move forward.  Hold that breath for 3 to 5 seconds.  Breathe out through pursed lips, slowly and completely.  Rest and repeat 10 times every hour while awake.  Rest longer if you become dizzy or lightheaded.       Incentive Spirometer   You were provided with an incentive spirometer in CPM/PAT, please follow the below instructions.   You were not provided an incentive spirometer in [facility], please disregard the incentive spirometer instructions  What is an incentive spirometer?  An incentive spirometer is a device used before and after surgery to “exercise” your lungs.  It helps you to take deeper breaths to expand your lungs.  Below is an example of a basic incentive spirometer.  The device you receive may differ slightly but they all function the  same.    Why do I need to use an incentive spirometer?  Using your incentive spirometer prepares your lungs for surgery and helps prevent lung problems after surgery.  How do I use my incentive spirometer?  When you're using your incentive spirometer, make sure to breathe through your mouth. If you breathe through your nose, the incentive spirometer won't work properly. You can hold your nose if you have trouble.  If you feel dizzy at any time, stop and rest. Try again at a later time.  Follow the steps below:  Set up your incentive spirometer, expand the flexible tubing and connect to the outlet.  Sit upright in a chair or bed. Hold the incentive spirometer at eye level.   Put the mouthpiece in your mouth and close your lips tightly around it. Slowly breathe out (exhale) completely.  Breathe in (inhale) slowly through your mouth as deeply as you can. As you take a breath, you will see the piston rise inside the large column. While the piston rises, the indicator should move upwards. It should stay in between the 2 arrows (see Figure).  Try to get the piston as high as you can, while keeping the indicator between the arrows.   If the indicator doesn't stay between the arrows, you're breathing either too fast or too slow.  When you get it as high as you can, hold your breath for 10 seconds, or as long as possible. While you're holding your breath, the piston will slowly fall to the base of the spirometer.  Once the piston reaches the bottom of the spirometer, breathe out slowly through your mouth. Rest for a few seconds.  Repeat 10 times. Try to get the piston to the same level with each breath.  Repeat every hour while awake  You can carefully clean the outside of the mouthpiece with an alcohol wipe or soap and water.      Preoperative Brain Exercises    What are brain exercises?  A brain exercise is any activity that engages your thinking (cognitive) skills.    What types of activities are considered brain  exercises?  Jigsaw puzzles, crossword puzzles, word jumble, memory games, word search, and many more.  Many can be found free online or on your phone via a mobile lavell.    Why should I do brain exercises before my surgery?  More recent research has shown brain exercise before surgery can lower the risk of postoperative delirium (confusion) which can be especially important for older adults.  Patients who did brain exercises for 5 to 10 hours the days before surgery, cut their risk of postoperative delirium in half up to 1 week after surgery.    Sit-to-Stand Exercise    What is the sit-to-stand exercise?  The sit-to-stand exercise strengthens the muscles of your lower body and muscles in the center of your body (core muscles for stability) helping to maintain and improve your strength and mobility.  How do I do the sit-to-stand exercise?  The goal is to do this exercise without using your arms or hands.  If this is too difficult, use your arms and hands or a chair with armrests to help slowly push yourself to the standing position and lower yourself back to the sitting position. As the movement becomes easier use your arms and hands less.    Steps to the sit-to-stand exercise  Sit up tall in a sturdy chair, knees bent, feet flat on the floor shoulder-width apart.  Shift your hips/pelvis forward in the chair to correctly position yourself for the next movement.  Lean forward at your hips.  Stand up straight putting equal weight on both feet.  Check to be sure you are properly aligned with the chair, in a slow controlled movement sit back down.  Repeat this exercise 10-15 times.  If needed you can do it fewer times until your strength improves.  Rest for 1 minute.  Do another 10-15 sit-to-stand exercises.  Try to do this in the morning and evening.        Instructions    Preoperative Fasting Guidelines    Why must I stop eating and drinking near surgery time?  With sedation, food or liquid in your stomach can enter your  lungs causing serious complications  Food can increase nausea and vomiting  When do I need to stop eating and drinking before my surgery?      Do not eat any food after midnight the night before your surgery/procedure. You may have up to 13.5 ounces of clear liquid until TWO hours before your instructed arrival time to the hospital.  This includes water, black tea/coffee, (no milk or cream) apple juice, and electrolyte drinks (Gatorade). You may chew gum until TWO hours before your surgery/procedure            Simple things you can do to help prevent blood clots     Blood clots are blockages that can form in the body's veins. When a blood clot forms in your deep veins, it may be called a deep vein thrombosis, or DVT for short. Blood clots can happen in any part of the body where blood flows, but they are most common in the arms and legs. If a piece of a blood clot breaks free and travels to the lungs, it is called a pulmonary embolus (PE). A PE can be a very serious problem.         Being in the hospital or having surgery can raise your chances of getting a blood clot because you may not be well enough to move around as much as you normally do.         Ways you can help prevent blood clots in the hospital       Wearing SCDs  SCDs stands for Sequential Compression Devices.   SCDs are special sleeves that wrap around your legs. They attach to a pump that fills them with air to gently squeeze your legs every few minutes.  This helps return the blood in your legs to your heart.   SCDs should only be taken off when walking or bathing. SCDs may not be comfortable, but they can help save your life.              Pump SCD leg sleeves  Wearing compression stockings - if your doctor orders them. These special snug-fitting stockings gently squeeze your legs to help blood flow.       Walking. Walking helps move the blood in your legs.   If your doctor says it is ok, try walking the halls at least   5 times a day. Ask us to help  you get up, so you don't fall.      Taking any blood-thinning medicines your doctor orders.              Ways you can help prevent blood clots at home         Wearing compression stockings - if your doctor orders them.   Walking - to help move the blood in your legs.    Taking any blood-thinning medicines your doctor orders.      Signs of a blood clot or PE    Tell your doctor or nurse right away if you have any of the problems listed below.         If you are at home, seek medical care right away. Call 911 for chest pain or problems breathing.            Signs of a blood clot (DVT) - such as pain, swelling, redness, or warmth in your arm or legs.  Signs of a pulmonary embolism (PE) - such as chest pain or feeling short of breath      Tobacco and Alcohol;  Do not drink alcohol or smoke within 24 hours of surgery.  It is best to quit smoking for as long as possible before any surgery or procedure.      The Week before Surgery        Seven days before Surgery  Check your [facility] medication instructions  Do the exercises provided to you by [facility]   Arrange for a responsible, adult licensed  to take you home after surgery and stay with you for 24 hours.  You will not be permitted to drive yourself home if you have received any anesthetic/sedation  Six days before surgery  Check your [facility] medication instructions  Do the exercises provided to you by C[facility]   Start using Chlorhexidene (CHG) body wash if prescribed  Five days before surgery  Check your [facility] medication instructions  Do the exercises provided to you by CPM   Continue to use CHG body wash if prescribed  Three days before surgery  Check your [facility] medication instructions  Do the exercises provided to you by CPM   Continue to use CHG body wash if prescribed  Two days before surgery  Check your [facility] medication instructions  Do the exercises provided to you by CPM   Continue to use CHG body wash if prescribed    The Day before Surgery       Check your [facility] medication and  all other CPM instructions including when to stop eating and drinking  You will be called with your arrival time for surgery in the late afternoon.  If you do not receive a call please reach out to your surgeon's office.  Do not smoke or drink 24 hours before surgery  Prepare items to bring with you to the hospital  Shower with your chlorhexidine wash if prescribed  Brush your teeth and use your chlorhexidine dental rinse if prescribed    The Day of Surgery       Check your [facility] medication instructions  Ensure you follow the instructions for when to stop eating and drinking  Shower, if prescribed use CHG.  Do not apply any lotions, creams, moisturizers, perfume or deodorant  Brush your teeth and use your CHG dental rinse if prescribed  Wear loose comfortable clothing  Avoid make-up  Remove  jewelry and piercings, consider professional piercing removal with a plastic spacer if needed  Bring photo ID and Insurance card  Bring an accurate medication list that includes medication dose, frequency and allergies  Bring a copy of your advanced directives (will, health care power of )  Bring any devices and controllers as well as medical devices you have been provided with for surgery (CPAP, slings, braces, etc.)  Dentures, eyeglasses, and contacts will be removed before surgery, please bring cases for contacts or glasses

## 2024-06-07 LAB — HOLD SPECIMEN: NORMAL

## 2024-06-08 LAB — STAPHYLOCOCCUS SPEC CULT: ABNORMAL

## 2024-06-09 LAB — BACTERIA UR CULT: ABNORMAL

## 2024-06-10 ENCOUNTER — TELEPHONE (OUTPATIENT)
Dept: ORTHOPEDIC SURGERY | Facility: CLINIC | Age: 64
End: 2024-06-10
Payer: MEDICARE

## 2024-06-10 DIAGNOSIS — N30.00 ACUTE CYSTITIS WITHOUT HEMATURIA: Primary | ICD-10-CM

## 2024-06-10 RX ORDER — SULFAMETHOXAZOLE AND TRIMETHOPRIM 800; 160 MG/1; MG/1
1 TABLET ORAL 2 TIMES DAILY
Qty: 14 TABLET | Refills: 0 | Status: SHIPPED | OUTPATIENT
Start: 2024-06-10 | End: 2024-06-17

## 2024-06-10 NOTE — TELEPHONE ENCOUNTER
Patient called to informed him about his lab results and urine culture results. Patient was asked to  antibiotics for his UTI and to complete the course of antibiotics. He is otherwise asymptomatic.

## 2024-06-24 ENCOUNTER — ANESTHESIA EVENT (OUTPATIENT)
Dept: OPERATING ROOM | Facility: HOSPITAL | Age: 64
End: 2024-06-24
Payer: MEDICARE

## 2024-06-25 ENCOUNTER — ANESTHESIA (OUTPATIENT)
Dept: OPERATING ROOM | Facility: HOSPITAL | Age: 64
End: 2024-06-25
Payer: MEDICARE

## 2024-06-25 ENCOUNTER — APPOINTMENT (OUTPATIENT)
Dept: RADIOLOGY | Facility: HOSPITAL | Age: 64
End: 2024-06-25
Payer: MEDICARE

## 2024-06-25 ENCOUNTER — HOSPITAL ENCOUNTER (INPATIENT)
Facility: HOSPITAL | Age: 64
LOS: 2 days | Discharge: HOME | End: 2024-06-28
Attending: ORTHOPAEDIC SURGERY | Admitting: ORTHOPAEDIC SURGERY
Payer: MEDICARE

## 2024-06-25 DIAGNOSIS — M54.16 LUMBAR RADICULOPATHY, CHRONIC: Primary | ICD-10-CM

## 2024-06-25 DIAGNOSIS — M99.63 OSSEOUS AND SUBLUXATION STENOSIS OF INTERVERTEBRAL FORAMINA OF LUMBAR REGION: ICD-10-CM

## 2024-06-25 LAB
ABO GROUP (TYPE) IN BLOOD: NORMAL
ANTIBODY SCREEN: NORMAL
RH FACTOR (ANTIGEN D): NORMAL

## 2024-06-25 PROCEDURE — A4649 SURGICAL SUPPLIES: HCPCS | Performed by: ORTHOPAEDIC SURGERY

## 2024-06-25 PROCEDURE — 2500000002 HC RX 250 W HCPCS SELF ADMINISTERED DRUGS (ALT 637 FOR MEDICARE OP, ALT 636 FOR OP/ED)

## 2024-06-25 PROCEDURE — 3600000018 HC OR TIME - INITIAL BASE CHARGE - PROCEDURE LEVEL SIX: Performed by: ORTHOPAEDIC SURGERY

## 2024-06-25 PROCEDURE — 1100000001 HC PRIVATE ROOM DAILY

## 2024-06-25 PROCEDURE — P9045 ALBUMIN (HUMAN), 5%, 250 ML: HCPCS | Mod: JZ

## 2024-06-25 PROCEDURE — 2720000007 HC OR 272 NO HCPCS: Performed by: ORTHOPAEDIC SURGERY

## 2024-06-25 PROCEDURE — 0SG00AJ FUSION OF LUMBAR VERTEBRAL JOINT WITH INTERBODY FUSION DEVICE, POSTERIOR APPROACH, ANTERIOR COLUMN, OPEN APPROACH: ICD-10-PCS | Performed by: ORTHOPAEDIC SURGERY

## 2024-06-25 PROCEDURE — 2500000004 HC RX 250 GENERAL PHARMACY W/ HCPCS (ALT 636 FOR OP/ED): Performed by: ANESTHESIOLOGY

## 2024-06-25 PROCEDURE — 3700000001 HC GENERAL ANESTHESIA TIME - INITIAL BASE CHARGE: Performed by: ORTHOPAEDIC SURGERY

## 2024-06-25 PROCEDURE — 0SG0071 FUSION OF LUMBAR VERTEBRAL JOINT WITH AUTOLOGOUS TISSUE SUBSTITUTE, POSTERIOR APPROACH, POSTERIOR COLUMN, OPEN APPROACH: ICD-10-PCS | Performed by: ORTHOPAEDIC SURGERY

## 2024-06-25 PROCEDURE — 61783 SCAN PROC SPINAL: CPT | Performed by: ORTHOPAEDIC SURGERY

## 2024-06-25 PROCEDURE — 7100000001 HC RECOVERY ROOM TIME - INITIAL BASE CHARGE: Performed by: ORTHOPAEDIC SURGERY

## 2024-06-25 PROCEDURE — 86901 BLOOD TYPING SEROLOGIC RH(D): CPT | Performed by: ORTHOPAEDIC SURGERY

## 2024-06-25 PROCEDURE — 2500000005 HC RX 250 GENERAL PHARMACY W/O HCPCS: Performed by: ORTHOPAEDIC SURGERY

## 2024-06-25 PROCEDURE — 22840 INSERT SPINE FIXATION DEVICE: CPT | Performed by: ORTHOPAEDIC SURGERY

## 2024-06-25 PROCEDURE — 01NB0ZZ RELEASE LUMBAR NERVE, OPEN APPROACH: ICD-10-PCS | Performed by: ORTHOPAEDIC SURGERY

## 2024-06-25 PROCEDURE — 3600000017 HC OR TIME - EACH INCREMENTAL 1 MINUTE - PROCEDURE LEVEL SIX: Performed by: ORTHOPAEDIC SURGERY

## 2024-06-25 PROCEDURE — 20936 SP BONE AGRFT LOCAL ADD-ON: CPT | Performed by: ORTHOPAEDIC SURGERY

## 2024-06-25 PROCEDURE — 97530 THERAPEUTIC ACTIVITIES: CPT | Mod: GP

## 2024-06-25 PROCEDURE — C1713 ANCHOR/SCREW BN/BN,TIS/BN: HCPCS | Performed by: ORTHOPAEDIC SURGERY

## 2024-06-25 PROCEDURE — 36415 COLL VENOUS BLD VENIPUNCTURE: CPT | Performed by: ORTHOPAEDIC SURGERY

## 2024-06-25 PROCEDURE — 2500000001 HC RX 250 WO HCPCS SELF ADMINISTERED DRUGS (ALT 637 FOR MEDICARE OP): Performed by: PHYSICAL THERAPIST

## 2024-06-25 PROCEDURE — 2500000005 HC RX 250 GENERAL PHARMACY W/O HCPCS

## 2024-06-25 PROCEDURE — 3700000002 HC GENERAL ANESTHESIA TIME - EACH INCREMENTAL 1 MINUTE: Performed by: ORTHOPAEDIC SURGERY

## 2024-06-25 PROCEDURE — 2500000004 HC RX 250 GENERAL PHARMACY W/ HCPCS (ALT 636 FOR OP/ED): Performed by: PHYSICAL THERAPIST

## 2024-06-25 PROCEDURE — 7100000011 HC EXTENDED STAY RECOVERY HOURLY - NURSING UNIT

## 2024-06-25 PROCEDURE — 8E0WXBZ COMPUTER ASSISTED PROCEDURE OF TRUNK REGION: ICD-10-PCS | Performed by: ORTHOPAEDIC SURGERY

## 2024-06-25 PROCEDURE — C1889 IMPLANT/INSERT DEVICE, NOC: HCPCS | Performed by: ORTHOPAEDIC SURGERY

## 2024-06-25 PROCEDURE — 22633 ARTHRD CMBN 1NTRSPC LUMBAR: CPT | Performed by: ORTHOPAEDIC SURGERY

## 2024-06-25 PROCEDURE — 2780000003 HC OR 278 NO HCPCS: Performed by: ORTHOPAEDIC SURGERY

## 2024-06-25 PROCEDURE — 97161 PT EVAL LOW COMPLEX 20 MIN: CPT | Mod: GP

## 2024-06-25 PROCEDURE — 7100000002 HC RECOVERY ROOM TIME - EACH INCREMENTAL 1 MINUTE: Performed by: ORTHOPAEDIC SURGERY

## 2024-06-25 PROCEDURE — 2500000004 HC RX 250 GENERAL PHARMACY W/ HCPCS (ALT 636 FOR OP/ED): Performed by: ORTHOPAEDIC SURGERY

## 2024-06-25 PROCEDURE — 2500000004 HC RX 250 GENERAL PHARMACY W/ HCPCS (ALT 636 FOR OP/ED)

## 2024-06-25 PROCEDURE — 20930 SP BONE ALGRFT MORSEL ADD-ON: CPT | Performed by: ORTHOPAEDIC SURGERY

## 2024-06-25 DEVICE — SET SCREW, 5.5, TI NS BRK OFF: Type: IMPLANTABLE DEVICE | Site: SPINE LUMBAR | Status: FUNCTIONAL

## 2024-06-25 DEVICE — SPACER, CATALYFT PL, 7MM, SHORT: Type: IMPLANTABLE DEVICE | Site: SPINE LUMBAR | Status: FUNCTIONAL

## 2024-06-25 DEVICE — DBM T43110 10CC GRAFTON PUTTY
Type: IMPLANTABLE DEVICE | Site: BACK | Status: FUNCTIONAL
Brand: GRAFTON®AND GRAFTON PLUS®DEMINERALIZED BONE MATRIX (DBM)

## 2024-06-25 DEVICE — SCREW, SOLERA 5.5/6.0 ATS, 7.5 X 50: Type: IMPLANTABLE DEVICE | Site: SPINE LUMBAR | Status: FUNCTIONAL

## 2024-06-25 DEVICE — ROD, 5.5 X 40 CVD TI SOLERA: Type: IMPLANTABLE DEVICE | Site: SPINE LUMBAR | Status: FUNCTIONAL

## 2024-06-25 RX ORDER — SODIUM CHLORIDE, SODIUM LACTATE, POTASSIUM CHLORIDE, CALCIUM CHLORIDE 600; 310; 30; 20 MG/100ML; MG/100ML; MG/100ML; MG/100ML
100 INJECTION, SOLUTION INTRAVENOUS CONTINUOUS
Status: DISCONTINUED | OUTPATIENT
Start: 2024-06-25 | End: 2024-06-25 | Stop reason: HOSPADM

## 2024-06-25 RX ORDER — DEXTROSE 50 % IN WATER (D50W) INTRAVENOUS SYRINGE
12.5
Status: DISCONTINUED | OUTPATIENT
Start: 2024-06-25 | End: 2024-06-28 | Stop reason: HOSPADM

## 2024-06-25 RX ORDER — DEXTROSE 50 % IN WATER (D50W) INTRAVENOUS SYRINGE
25
Status: DISCONTINUED | OUTPATIENT
Start: 2024-06-25 | End: 2024-06-28 | Stop reason: HOSPADM

## 2024-06-25 RX ORDER — POLYMYXIN B 500000 [USP'U]/1
INJECTION, POWDER, LYOPHILIZED, FOR SOLUTION INTRAMUSCULAR; INTRATHECAL; INTRAVENOUS; OPHTHALMIC AS NEEDED
Status: DISCONTINUED | OUTPATIENT
Start: 2024-06-25 | End: 2024-06-25 | Stop reason: HOSPADM

## 2024-06-25 RX ORDER — ALBUTEROL SULFATE 90 UG/1
2 AEROSOL, METERED RESPIRATORY (INHALATION) EVERY 4 HOURS PRN
Status: DISCONTINUED | OUTPATIENT
Start: 2024-06-25 | End: 2024-06-28 | Stop reason: HOSPADM

## 2024-06-25 RX ORDER — ONDANSETRON HYDROCHLORIDE 2 MG/ML
INJECTION, SOLUTION INTRAVENOUS AS NEEDED
Status: DISCONTINUED | OUTPATIENT
Start: 2024-06-25 | End: 2024-06-25

## 2024-06-25 RX ORDER — HYDROMORPHONE HYDROCHLORIDE 1 MG/ML
INJECTION, SOLUTION INTRAMUSCULAR; INTRAVENOUS; SUBCUTANEOUS AS NEEDED
Status: DISCONTINUED | OUTPATIENT
Start: 2024-06-25 | End: 2024-06-25

## 2024-06-25 RX ORDER — CEFAZOLIN SODIUM 2 G/100ML
2 INJECTION, SOLUTION INTRAVENOUS ONCE
Status: DISCONTINUED | OUTPATIENT
Start: 2024-06-25 | End: 2024-06-25 | Stop reason: HOSPADM

## 2024-06-25 RX ORDER — ROCURONIUM BROMIDE 10 MG/ML
INJECTION, SOLUTION INTRAVENOUS AS NEEDED
Status: DISCONTINUED | OUTPATIENT
Start: 2024-06-25 | End: 2024-06-25

## 2024-06-25 RX ORDER — SODIUM CHLORIDE, SODIUM LACTATE, POTASSIUM CHLORIDE, CALCIUM CHLORIDE 600; 310; 30; 20 MG/100ML; MG/100ML; MG/100ML; MG/100ML
INJECTION, SOLUTION INTRAVENOUS CONTINUOUS PRN
Status: DISCONTINUED | OUTPATIENT
Start: 2024-06-25 | End: 2024-06-25

## 2024-06-25 RX ORDER — SODIUM CHLORIDE, SODIUM LACTATE, POTASSIUM CHLORIDE, CALCIUM CHLORIDE 600; 310; 30; 20 MG/100ML; MG/100ML; MG/100ML; MG/100ML
50 INJECTION, SOLUTION INTRAVENOUS CONTINUOUS
Status: ACTIVE | OUTPATIENT
Start: 2024-06-25 | End: 2024-06-26

## 2024-06-25 RX ORDER — METHOCARBAMOL 100 MG/ML
1000 INJECTION, SOLUTION INTRAMUSCULAR; INTRAVENOUS ONCE
Status: COMPLETED | OUTPATIENT
Start: 2024-06-25 | End: 2024-06-25

## 2024-06-25 RX ORDER — POLYETHYLENE GLYCOL 3350 17 G/17G
17 POWDER, FOR SOLUTION ORAL DAILY
Status: DISCONTINUED | OUTPATIENT
Start: 2024-06-25 | End: 2024-06-28 | Stop reason: HOSPADM

## 2024-06-25 RX ORDER — FENTANYL CITRATE 50 UG/ML
INJECTION, SOLUTION INTRAMUSCULAR; INTRAVENOUS AS NEEDED
Status: DISCONTINUED | OUTPATIENT
Start: 2024-06-25 | End: 2024-06-25

## 2024-06-25 RX ORDER — ONDANSETRON 4 MG/1
4 TABLET, FILM COATED ORAL EVERY 8 HOURS PRN
Status: DISCONTINUED | OUTPATIENT
Start: 2024-06-25 | End: 2024-06-28 | Stop reason: HOSPADM

## 2024-06-25 RX ORDER — TRANEXAMIC ACID 100 MG/ML
INJECTION, SOLUTION INTRAVENOUS AS NEEDED
Status: DISCONTINUED | OUTPATIENT
Start: 2024-06-25 | End: 2024-06-25

## 2024-06-25 RX ORDER — LIDOCAINE HYDROCHLORIDE 10 MG/ML
0.1 INJECTION INFILTRATION; PERINEURAL ONCE
Status: DISCONTINUED | OUTPATIENT
Start: 2024-06-25 | End: 2024-06-25 | Stop reason: HOSPADM

## 2024-06-25 RX ORDER — SODIUM CHLORIDE 0.9 G/100ML
IRRIGANT IRRIGATION AS NEEDED
Status: DISCONTINUED | OUTPATIENT
Start: 2024-06-25 | End: 2024-06-25 | Stop reason: HOSPADM

## 2024-06-25 RX ORDER — PHENYLEPHRINE HCL IN 0.9% NACL 0.4MG/10ML
SYRINGE (ML) INTRAVENOUS AS NEEDED
Status: DISCONTINUED | OUTPATIENT
Start: 2024-06-25 | End: 2024-06-25

## 2024-06-25 RX ORDER — PROPOFOL 10 MG/ML
INJECTION, EMULSION INTRAVENOUS AS NEEDED
Status: DISCONTINUED | OUTPATIENT
Start: 2024-06-25 | End: 2024-06-25

## 2024-06-25 RX ORDER — ATORVASTATIN CALCIUM 80 MG/1
80 TABLET, FILM COATED ORAL DAILY
Status: DISCONTINUED | OUTPATIENT
Start: 2024-06-25 | End: 2024-06-28 | Stop reason: HOSPADM

## 2024-06-25 RX ORDER — DEXAMETHASONE SODIUM PHOSPHATE 100 MG/10ML
10 INJECTION INTRAMUSCULAR; INTRAVENOUS EVERY 8 HOURS SCHEDULED
Status: COMPLETED | OUTPATIENT
Start: 2024-06-25 | End: 2024-06-26

## 2024-06-25 RX ORDER — PANTOPRAZOLE SODIUM 20 MG/1
20 TABLET, DELAYED RELEASE ORAL
Status: DISCONTINUED | OUTPATIENT
Start: 2024-06-26 | End: 2024-06-28 | Stop reason: HOSPADM

## 2024-06-25 RX ORDER — HYDROMORPHONE HCL/0.9% NACL/PF 15 MG/30ML
PATIENT CONTROLLED ANALGESIA SYRINGE INTRAVENOUS CONTINUOUS
Status: DISCONTINUED | OUTPATIENT
Start: 2024-06-25 | End: 2024-06-26

## 2024-06-25 RX ORDER — ONDANSETRON HYDROCHLORIDE 2 MG/ML
4 INJECTION, SOLUTION INTRAVENOUS ONCE AS NEEDED
Status: DISCONTINUED | OUTPATIENT
Start: 2024-06-25 | End: 2024-06-25 | Stop reason: HOSPADM

## 2024-06-25 RX ORDER — LIDOCAINE HYDROCHLORIDE 20 MG/ML
INJECTION, SOLUTION INFILTRATION; PERINEURAL AS NEEDED
Status: DISCONTINUED | OUTPATIENT
Start: 2024-06-25 | End: 2024-06-25

## 2024-06-25 RX ORDER — ASPIRIN 81 MG/1
81 TABLET ORAL DAILY
Status: DISCONTINUED | OUTPATIENT
Start: 2024-06-26 | End: 2024-06-28 | Stop reason: HOSPADM

## 2024-06-25 RX ORDER — KETOROLAC TROMETHAMINE 30 MG/ML
INJECTION, SOLUTION INTRAMUSCULAR; INTRAVENOUS AS NEEDED
Status: DISCONTINUED | OUTPATIENT
Start: 2024-06-25 | End: 2024-06-25

## 2024-06-25 RX ORDER — CEFAZOLIN SODIUM 2 G/100ML
2 INJECTION, SOLUTION INTRAVENOUS EVERY 8 HOURS
Status: COMPLETED | OUTPATIENT
Start: 2024-06-25 | End: 2024-06-26

## 2024-06-25 RX ORDER — HYDROMORPHONE HYDROCHLORIDE 1 MG/ML
0.5 INJECTION, SOLUTION INTRAMUSCULAR; INTRAVENOUS; SUBCUTANEOUS EVERY 5 MIN PRN
Status: DISCONTINUED | OUTPATIENT
Start: 2024-06-25 | End: 2024-06-25 | Stop reason: HOSPADM

## 2024-06-25 RX ORDER — METHOCARBAMOL 500 MG/1
750 TABLET, FILM COATED ORAL EVERY 8 HOURS PRN
Status: DISCONTINUED | OUTPATIENT
Start: 2024-06-25 | End: 2024-06-28 | Stop reason: HOSPADM

## 2024-06-25 RX ORDER — HYDROMORPHONE HYDROCHLORIDE 1 MG/ML
0.2 INJECTION, SOLUTION INTRAMUSCULAR; INTRAVENOUS; SUBCUTANEOUS EVERY 5 MIN PRN
Status: DISCONTINUED | OUTPATIENT
Start: 2024-06-25 | End: 2024-06-25 | Stop reason: HOSPADM

## 2024-06-25 RX ORDER — POVIDONE-IODINE 10 MG/G
1 OINTMENT TOPICAL ONCE
Status: DISCONTINUED | OUTPATIENT
Start: 2024-06-25 | End: 2024-06-25 | Stop reason: HOSPADM

## 2024-06-25 RX ORDER — VANCOMYCIN HYDROCHLORIDE 1 G/20ML
INJECTION, POWDER, LYOPHILIZED, FOR SOLUTION INTRAVENOUS AS NEEDED
Status: DISCONTINUED | OUTPATIENT
Start: 2024-06-25 | End: 2024-06-25 | Stop reason: HOSPADM

## 2024-06-25 RX ORDER — ONDANSETRON HYDROCHLORIDE 2 MG/ML
4 INJECTION, SOLUTION INTRAVENOUS EVERY 8 HOURS PRN
Status: DISCONTINUED | OUTPATIENT
Start: 2024-06-25 | End: 2024-06-28 | Stop reason: HOSPADM

## 2024-06-25 RX ORDER — NALOXONE HYDROCHLORIDE 0.4 MG/ML
0.2 INJECTION, SOLUTION INTRAMUSCULAR; INTRAVENOUS; SUBCUTANEOUS EVERY 5 MIN PRN
Status: DISCONTINUED | OUTPATIENT
Start: 2024-06-25 | End: 2024-06-28 | Stop reason: HOSPADM

## 2024-06-25 RX ORDER — NALOXONE HYDROCHLORIDE 0.4 MG/ML
0.2 INJECTION, SOLUTION INTRAMUSCULAR; INTRAVENOUS; SUBCUTANEOUS AS NEEDED
Status: DISCONTINUED | OUTPATIENT
Start: 2024-06-25 | End: 2024-06-28 | Stop reason: HOSPADM

## 2024-06-25 RX ORDER — ALBUMIN HUMAN 50 G/1000ML
SOLUTION INTRAVENOUS AS NEEDED
Status: DISCONTINUED | OUTPATIENT
Start: 2024-06-25 | End: 2024-06-25

## 2024-06-25 RX ORDER — ALBUTEROL SULFATE 0.83 MG/ML
SOLUTION RESPIRATORY (INHALATION) AS NEEDED
Status: DISCONTINUED | OUTPATIENT
Start: 2024-06-25 | End: 2024-06-25

## 2024-06-25 RX ORDER — MULTIVIT-MIN/IRON FUM/FOLIC AC 7.5 MG-4
1 TABLET ORAL DAILY
Status: DISCONTINUED | OUTPATIENT
Start: 2024-06-25 | End: 2024-06-28 | Stop reason: HOSPADM

## 2024-06-25 RX ORDER — CEFAZOLIN 1 G/1
INJECTION, POWDER, FOR SOLUTION INTRAVENOUS AS NEEDED
Status: DISCONTINUED | OUTPATIENT
Start: 2024-06-25 | End: 2024-06-25

## 2024-06-25 SDOH — SOCIAL STABILITY: SOCIAL INSECURITY: ARE YOU OR HAVE YOU BEEN THREATENED OR ABUSED PHYSICALLY, EMOTIONALLY, OR SEXUALLY BY ANYONE?: NO

## 2024-06-25 SDOH — SOCIAL STABILITY: SOCIAL INSECURITY: DO YOU FEEL ANYONE HAS EXPLOITED OR TAKEN ADVANTAGE OF YOU FINANCIALLY OR OF YOUR PERSONAL PROPERTY?: NO

## 2024-06-25 SDOH — ECONOMIC STABILITY: HOUSING INSECURITY: IN THE LAST 12 MONTHS, HOW MANY PLACES HAVE YOU LIVED?: 1

## 2024-06-25 SDOH — SOCIAL STABILITY: SOCIAL INSECURITY: HAVE YOU HAD THOUGHTS OF HARMING ANYONE ELSE?: NO

## 2024-06-25 SDOH — ECONOMIC STABILITY: FOOD INSECURITY: WITHIN THE PAST 12 MONTHS, YOU WORRIED THAT YOUR FOOD WOULD RUN OUT BEFORE YOU GOT MONEY TO BUY MORE.: NEVER TRUE

## 2024-06-25 SDOH — SOCIAL STABILITY: SOCIAL INSECURITY: ARE THERE ANY APPARENT SIGNS OF INJURIES/BEHAVIORS THAT COULD BE RELATED TO ABUSE/NEGLECT?: NO

## 2024-06-25 SDOH — ECONOMIC STABILITY: INCOME INSECURITY: IN THE PAST 12 MONTHS, HAS THE ELECTRIC, GAS, OIL, OR WATER COMPANY THREATENED TO SHUT OFF SERVICE IN YOUR HOME?: NO

## 2024-06-25 SDOH — HEALTH STABILITY: MENTAL HEALTH
STRESS IS WHEN SOMEONE FEELS TENSE, NERVOUS, ANXIOUS, OR CAN'T SLEEP AT NIGHT BECAUSE THEIR MIND IS TROUBLED. HOW STRESSED ARE YOU?: NOT AT ALL

## 2024-06-25 SDOH — ECONOMIC STABILITY: INCOME INSECURITY: HOW HARD IS IT FOR YOU TO PAY FOR THE VERY BASICS LIKE FOOD, HOUSING, MEDICAL CARE, AND HEATING?: NOT VERY HARD

## 2024-06-25 SDOH — HEALTH STABILITY: PHYSICAL HEALTH: ON AVERAGE, HOW MANY DAYS PER WEEK DO YOU ENGAGE IN MODERATE TO STRENUOUS EXERCISE (LIKE A BRISK WALK)?: 0 DAYS

## 2024-06-25 SDOH — ECONOMIC STABILITY: FOOD INSECURITY: WITHIN THE PAST 12 MONTHS, THE FOOD YOU BOUGHT JUST DIDN'T LAST AND YOU DIDN'T HAVE MONEY TO GET MORE.: NEVER TRUE

## 2024-06-25 SDOH — ECONOMIC STABILITY: HOUSING INSECURITY
IN THE LAST 12 MONTHS, WAS THERE A TIME WHEN YOU DID NOT HAVE A STEADY PLACE TO SLEEP OR SLEPT IN A SHELTER (INCLUDING NOW)?: NO

## 2024-06-25 SDOH — SOCIAL STABILITY: SOCIAL INSECURITY: HAVE YOU HAD ANY THOUGHTS OF HARMING ANYONE ELSE?: NO

## 2024-06-25 SDOH — SOCIAL STABILITY: SOCIAL INSECURITY: ABUSE: ADULT

## 2024-06-25 SDOH — HEALTH STABILITY: PHYSICAL HEALTH: ON AVERAGE, HOW MANY MINUTES DO YOU ENGAGE IN EXERCISE AT THIS LEVEL?: 0 MIN

## 2024-06-25 SDOH — ECONOMIC STABILITY: INCOME INSECURITY: IN THE LAST 12 MONTHS, WAS THERE A TIME WHEN YOU WERE NOT ABLE TO PAY THE MORTGAGE OR RENT ON TIME?: NO

## 2024-06-25 SDOH — SOCIAL STABILITY: SOCIAL INSECURITY: WERE YOU ABLE TO COMPLETE ALL THE BEHAVIORAL HEALTH SCREENINGS?: YES

## 2024-06-25 SDOH — SOCIAL STABILITY: SOCIAL INSECURITY: DO YOU FEEL UNSAFE GOING BACK TO THE PLACE WHERE YOU ARE LIVING?: NO

## 2024-06-25 SDOH — ECONOMIC STABILITY: TRANSPORTATION INSECURITY
IN THE PAST 12 MONTHS, HAS THE LACK OF TRANSPORTATION KEPT YOU FROM MEDICAL APPOINTMENTS OR FROM GETTING MEDICATIONS?: NO

## 2024-06-25 SDOH — ECONOMIC STABILITY: TRANSPORTATION INSECURITY
IN THE PAST 12 MONTHS, HAS LACK OF TRANSPORTATION KEPT YOU FROM MEETINGS, WORK, OR FROM GETTING THINGS NEEDED FOR DAILY LIVING?: NO

## 2024-06-25 SDOH — SOCIAL STABILITY: SOCIAL INSECURITY: DOES ANYONE TRY TO KEEP YOU FROM HAVING/CONTACTING OTHER FRIENDS OR DOING THINGS OUTSIDE YOUR HOME?: NO

## 2024-06-25 SDOH — HEALTH STABILITY: MENTAL HEALTH
HOW OFTEN DO YOU NEED TO HAVE SOMEONE HELP YOU WHEN YOU READ INSTRUCTIONS, PAMPHLETS, OR OTHER WRITTEN MATERIAL FROM YOUR DOCTOR OR PHARMACY?: NEVER

## 2024-06-25 SDOH — SOCIAL STABILITY: SOCIAL INSECURITY: HAS ANYONE EVER THREATENED TO HURT YOUR FAMILY OR YOUR PETS?: NO

## 2024-06-25 ASSESSMENT — COGNITIVE AND FUNCTIONAL STATUS - GENERAL
WALKING IN HOSPITAL ROOM: A LITTLE
WALKING IN HOSPITAL ROOM: A LITTLE
DAILY ACTIVITIY SCORE: 23
TURNING FROM BACK TO SIDE WHILE IN FLAT BAD: A LITTLE
CLIMB 3 TO 5 STEPS WITH RAILING: A LITTLE
TURNING FROM BACK TO SIDE WHILE IN FLAT BAD: A LITTLE
MOVING FROM LYING ON BACK TO SITTING ON SIDE OF FLAT BED WITH BEDRAILS: A LITTLE
CLIMB 3 TO 5 STEPS WITH RAILING: A LOT
MOVING FROM LYING ON BACK TO SITTING ON SIDE OF FLAT BED WITH BEDRAILS: A LITTLE
PATIENT BASELINE BEDBOUND: NO
MOVING TO AND FROM BED TO CHAIR: A LITTLE
MOVING TO AND FROM BED TO CHAIR: A LITTLE
STANDING UP FROM CHAIR USING ARMS: A LITTLE
DRESSING REGULAR LOWER BODY CLOTHING: A LITTLE
STANDING UP FROM CHAIR USING ARMS: A LITTLE
MOBILITY SCORE: 18
MOBILITY SCORE: 17

## 2024-06-25 ASSESSMENT — PAIN SCALES - GENERAL
PAINLEVEL_OUTOF10: 0 - NO PAIN
PAINLEVEL_OUTOF10: 4
PAIN_LEVEL: 4
PAINLEVEL_OUTOF10: 10 - WORST POSSIBLE PAIN
PAINLEVEL_OUTOF10: 10 - WORST POSSIBLE PAIN
PAINLEVEL_OUTOF10: 4
PAINLEVEL_OUTOF10: 3
PAINLEVEL_OUTOF10: 10 - WORST POSSIBLE PAIN
PAINLEVEL_OUTOF10: 4
PAINLEVEL_OUTOF10: 5 - MODERATE PAIN
PAINLEVEL_OUTOF10: 3
PAINLEVEL_OUTOF10: 4
PAINLEVEL_OUTOF10: 4

## 2024-06-25 ASSESSMENT — PAIN - FUNCTIONAL ASSESSMENT
PAIN_FUNCTIONAL_ASSESSMENT: 0-10

## 2024-06-25 ASSESSMENT — ACTIVITIES OF DAILY LIVING (ADL)
JUDGMENT_ADEQUATE_SAFELY_COMPLETE_DAILY_ACTIVITIES: YES
LACK_OF_TRANSPORTATION: NO
DRESSING YOURSELF: INDEPENDENT
GROOMING: INDEPENDENT
ADEQUATE_TO_COMPLETE_ADL: YES
TOILETING: INDEPENDENT
HEARING - RIGHT EAR: FUNCTIONAL
PATIENT'S MEMORY ADEQUATE TO SAFELY COMPLETE DAILY ACTIVITIES?: YES
BATHING: INDEPENDENT
HEARING - LEFT EAR: FUNCTIONAL
FEEDING YOURSELF: INDEPENDENT
WALKS IN HOME: INDEPENDENT

## 2024-06-25 ASSESSMENT — LIFESTYLE VARIABLES
AUDIT-C TOTAL SCORE: 2
HOW OFTEN DO YOU HAVE A DRINK CONTAINING ALCOHOL: MONTHLY OR LESS
HOW OFTEN DO YOU HAVE 6 OR MORE DRINKS ON ONE OCCASION: LESS THAN MONTHLY
AUDIT-C TOTAL SCORE: 2
SUBSTANCE_ABUSE_PAST_12_MONTHS: NO
PRESCIPTION_ABUSE_PAST_12_MONTHS: NO
HOW MANY STANDARD DRINKS CONTAINING ALCOHOL DO YOU HAVE ON A TYPICAL DAY: 1 OR 2
SKIP TO QUESTIONS 9-10: 0

## 2024-06-25 ASSESSMENT — COLUMBIA-SUICIDE SEVERITY RATING SCALE - C-SSRS
2. HAVE YOU ACTUALLY HAD ANY THOUGHTS OF KILLING YOURSELF?: NO
1. IN THE PAST MONTH, HAVE YOU WISHED YOU WERE DEAD OR WISHED YOU COULD GO TO SLEEP AND NOT WAKE UP?: NO
1. IN THE PAST MONTH, HAVE YOU WISHED YOU WERE DEAD OR WISHED YOU COULD GO TO SLEEP AND NOT WAKE UP?: NO
6. HAVE YOU EVER DONE ANYTHING, STARTED TO DO ANYTHING, OR PREPARED TO DO ANYTHING TO END YOUR LIFE?: NO
2. HAVE YOU ACTUALLY HAD ANY THOUGHTS OF KILLING YOURSELF?: NO
6. HAVE YOU EVER DONE ANYTHING, STARTED TO DO ANYTHING, OR PREPARED TO DO ANYTHING TO END YOUR LIFE?: NO

## 2024-06-25 ASSESSMENT — PATIENT HEALTH QUESTIONNAIRE - PHQ9
2. FEELING DOWN, DEPRESSED OR HOPELESS: NOT AT ALL
1. LITTLE INTEREST OR PLEASURE IN DOING THINGS: NOT AT ALL
SUM OF ALL RESPONSES TO PHQ9 QUESTIONS 1 & 2: 0

## 2024-06-25 NOTE — ANESTHESIA POSTPROCEDURE EVALUATION
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    Anesthesia Post Evaluation    Patient location during evaluation: PACU  Patient participation: complete - patient participated  Level of consciousness: awake and alert  Pain score: 4  Pain management: adequate  Airway patency: patent  Cardiovascular status: acceptable  Respiratory status: acceptable  Hydration status: acceptable  Postoperative Nausea and Vomiting: none        There were no known notable events for this encounter.

## 2024-06-25 NOTE — ANESTHESIA PROCEDURE NOTES
Airway  Date/Time: 6/25/2024 7:37 AM  Urgency: elective    Airway not difficult    Staffing  Performed: JUNO   Authorized by: Kam Tavarez MD    Performed by: JUNO Méndez  Patient location during procedure: OR    Indications and Patient Condition  Indications for airway management: anesthesia  Spontaneous Ventilation: absent  Sedation level: deep  Preoxygenated: yes  Patient position: sniffing  Mask difficulty assessment: 1 - vent by mask    Final Airway Details  Final airway type: endotracheal airway      Successful airway: ETT  Cuffed: yes   Successful intubation technique: direct laryngoscopy  Facilitating devices/methods: intubating stylet  Endotracheal tube insertion site: oral  Blade: Mumtaz  Blade size: #4  ETT size (mm): 7.5  Cormack-Lehane Classification: grade IIa - partial view of glottis  Placement verified by: chest auscultation and capnometry   Measured from: lips  ETT to lips (cm): 21  Number of attempts at approach: 1

## 2024-06-25 NOTE — CARE PLAN
Problem: Pain  Goal: Takes deep breaths with improved pain control throughout the shift  Outcome: Progressing  Goal: Turns in bed with improved pain control throughout the shift  Outcome: Progressing  Goal: Walks with improved pain control throughout the shift  Outcome: Progressing  Goal: Performs ADL's with improved pain control throughout shift  Outcome: Progressing  Goal: Participates in PT with improved pain control throughout the shift  Outcome: Progressing  Goal: Free from opioid side effects throughout the shift  Outcome: Progressing  Goal: Free from acute confusion related to pain meds throughout the shift  Outcome: Progressing     Problem: Fall/Injury  Goal: Not fall by end of shift  Outcome: Progressing  Goal: Be free from injury by end of the shift  Outcome: Progressing  Goal: Verbalize understanding of personal risk factors for fall in the hospital  Outcome: Progressing  Goal: Verbalize understanding of risk factor reduction measures to prevent injury from fall in the home  Outcome: Progressing  Goal: Use assistive devices by end of the shift  Outcome: Progressing  Goal: Pace activities to prevent fatigue by end of the shift  Outcome: Progressing   The patient's goals for the shift include      The clinical goals for the shift include safety and free from falls

## 2024-06-25 NOTE — PROGRESS NOTES
Alfa Cast is a 64 y.o. male on day 1 of admission presenting with Lumbar radiculopathy, chronic.    Subjective   Patient examined at bedside.  Pain is controlled Denies N/T down the lower extremities.        Objective     Physical Exam      C5: SILT   Deltoid 5/5 Left; 5/5 Right  C6: SILT   Wrist Ext: 5/5 Left; 5/5 Right  C7: SILT   Triceps: 5/5 Left; 5/5 Right  C8: SILT   Finger flexion: 5/5 Left; 5/5 Right  T1: SILT    Interossei: 5/5 Left; 5/5 Right        L1: SILT       L2: SILT      Hip flexors 5/5 Left; 5/5 Right  L3: SILT      Knee extension 5/5 Left; 5/5 Right  L4: SILT      Tib Ant. (Dorsiflexion) 5/5 Left; 5/5 Right  L5: SILT      EHL5/5 Left; 5/5 Right  S1: SILT      Planter flexion 5/5 Left; 5/5 Right          Last Recorded Vitals  Blood pressure 128/70, pulse 74, temperature 36.2 °C (97.2 °F), temperature source Temporal, resp. rate 16, SpO2 93%.  Intake/Output last 3 Shifts:  No intake/output data recorded.        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 Gonzalez on 6/25    Plan:  Ortho primary  WBAT of the UE and LE  No extreme motions of the lumbar spine  DVT ppx:  SCDs and ambulation  Ancef x 24 hours post operatively  Pain control with PCA pump; when controlled will switch to oral medications  Serrato in.  Plan to remove on 6/26  Drain x 1.  Record Q8 output  Resume home medications  Regular diet  Decardron for 24 hours post operatively  PT/OT consult  Standing upright XR L Spine when able. AP and lateral view    Discussed with Dr. Carlos Thayer, PGY3    Spine team  Beverly Jordan, PGY2  Jass Thayer, PGY3    I saw and evaluated the patient.  I personally  obtained the key and critical portions of the history and physical exam or was physically present for key and critical portions performed by the Resident. I reviewed the documentation and discussed the patient with the Resident.  I agree with the Resident’s medical decision making as documented in the note.               Jass Thayer, DO

## 2024-06-25 NOTE — BRIEF OP NOTE
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 L4-5 posterior arthrodesis  2. L4-5 posterior segmental instrumented fusion with Medtronic Solera screw and marysol system  3. L4-5 transforaminal lumbar interbody fusion with Medtronic Catalyft Titanium expandible interbody cage.  4. L4-5 laminectomy with foraminotomy, left L4-5 complete facetectomy and right partial facetectomy with the purpose of decompression and direct visualization of the thecal sac and neural elements and complete release of the left L4 nerve root.  5. Application of bone autograft harvested from laminectomy and Medtronic Maite Allograft Fibers  6. Use of O-arm and stealth navigation for pedicle screw placement    Surgeons      * Honey Gonzalez - Primary    Resident/Fellow/Other Assistant:  Surgeons and Role:     * Jass Thayer DO - Resident - Assisting    Procedure Summary  Anesthesia: General  ASA: II  Anesthesia Staff: Anesthesiologist: Kam Tavarez MD  CRNA: AKYDEN Jones-CRNA  C-AA: JUNO Méndez  Estimated Blood Loss: 150 mL  Intra-op Medications:   Administrations occurring from 0715 to 1230 on 24:   Medication Name Total Dose   polymyxin B injection 500,000 Units   sodium  chloride 0.9 % irrigation solution 1,000 mL   thrombin (recombinant) (Recothrom) topical solution 5,000 Units   gelatin absorbable (Gelfoam) 100 sponge 1 each   vancomycin (Vancocin) vial for injection 1 g              Anesthesia Record               Intraprocedure I/O Totals          Intake    Tranexamic Acid 0.00 mL    The total shown is the total volume documented since Anesthesia Start was filed.    Total Intake 0 mL       Output    Urine 210 mL    Est. Blood Loss 150 mL    Total Output 360 mL       Net    Net Volume -360 mL          Specimen: No specimens collected     Staff:   Circulator: Jen Avendano Person: Zina          Findings: see post surgical note     Complications:  None; patient tolerated the procedure well.     Disposition: PACU - hemodynamically stable.  Condition: stable  Specimens Collected: No specimens collected  Attending Attestation: I was present and scrubbed for the entire procedure.    Honey Gonzalez  Phone Number: [phone]

## 2024-06-25 NOTE — PROGRESS NOTES
Physical Therapy    Physical Therapy Evaluation & Treatment    Patient Name: Alfa Cast  MRN: [id number]  Today's Date: 6/25/2024   Time Calculation  Start Time: 1430  Stop Time: 1458  Time Calculation (min): 28 min    Assessment/Plan   PT Assessment  PT Assessment Results: Decreased strength, Decreased mobility, Impaired balance  Rehab Prognosis: Good  End of Session Communication: Bedside nurse  Assessment Comment: Pt tolerated therapy well with a dec in pain during session. Pt presents with dec strength, mobility, and balance. Pt was able to AMB 300ft total and was not limited by endurance. Pt to benefit from skilled PT to address above deficits and to benefit from low intensity PT post discharge.  End of Session Patient Position: Up in chair, Alarm off, not on at start of session   IP OR SWING BED PT PLAN  Inpatient or Swing Bed: Inpatient  PT Plan  Treatment/Interventions: Bed mobility, Transfer training, Gait training, Stair training, Balance training, Strengthening, Therapeutic exercise, Therapeutic activity, Home exercise program  PT Plan: Ongoing PT  PT Frequency: Daily  PT Discharge Recommendations: Low intensity level of continued care  Equipment Recommended upon Discharge:  (tbd)  PT Recommended Transfer Status: Stand by assist, Assistive device  PT - OK to Discharge: Yes (eval complete and discharge recommendations made)      Subjective     General Visit Information:  General  Reason for Referral: S/p Fusion Spine Posterior Lumbar with Navigation (Spine Lumbar)  Past Medical History Relevant to Rehab: past medical history of Allergic contact dermatitis, Arthritis, Chronic pain disorder, GERD, Impotence, Prostate cancer, and URI.  Missed Visit: No  Prior to Session Communication: Bedside nurse  Patient Position Received: Bed, 3 rail up, Alarm off, not on at start of session  General Comment: Pt alert and agreeable to therapy. Eager to mobilize  Home Living:  Home Living  Type of Home: House  Lives With:  Adult children (adult son (24yo) lives w/ pt)  Home Adaptive Equipment: Crutches, Reacher (walking sticks. Pt uses AD when experiencing back pain)  Home Layout: Laundry in basement  Home Access: Stairs to enter with rails  Entrance Stairs-Rails: Both  Entrance Stairs-Number of Steps: 3  Bathroom Shower/Tub: Tub/shower unit  Bathroom Equipment: Raised toilet seat with rails, Tub transfer bench  Home Living Comments: Pt has 12 steps to descend to get to laundry in basement  Prior Level of Function:  Prior Function Per Pt/Caregiver Report  Level of [place]: Independent with ADLs and functional transfers, Independent with homemaking with ambulation, Independent with homemaking with wheelchair  Vocational: Retired ()  Prior Function Comments: +driving, - falls  Precautions:  Precautions  Medical Precautions: Fall precautions  Post-Surgical Precautions: Spinal precautions    Objective   Pain:  Pain Assessment  Pain Assessment: 0-10  0-10 (Numeric) Pain Score: 3  Pain Type: Surgical pain  Pain Location: Back  Pain Interventions: Ambulation/increased activity, Repositioned, Rest  Response to Interventions: pt reported dec in pain during and post AMB  Cognition:  Cognition  Overall Cognitive Status: Within Functional Limits  Orientation Level: Oriented X4    General Assessments:  Activity Tolerance  Endurance: Endurance does not limit participation in activity    Sensation  Light Touch: No apparent deficits    Strength  Strength Comments: WFL  Coordination  Movements are Fluid and Coordinated: Yes    Postural Control  Postural Control: Within Functional Limits    Static Sitting Balance  Static Sitting-Balance Support: Bilateral upper extremity supported, Feet supported  Static Sitting-Level of Assistance: Independent  Dynamic Sitting Balance  Dynamic Sitting-Balance Support: Bilateral upper extremity supported, Feet supported  Dynamic Sitting-Balance: Forward lean  Dynamic Sitting-Comments: independent    Static  Standing Balance  Static Standing-Balance Support: Bilateral upper extremity supported (FWW)  Static Standing-Level of Assistance: Close supervision  Dynamic Standing Balance  Dynamic Standing-Balance Support: Bilateral upper extremity supported (FWW)  Dynamic Standing-Balance: Turning  Dynamic Standing-Comments: close supervision  Functional Assessments:  Bed Mobility  Bed Mobility: Yes  Bed Mobility 1  Bed Mobility 1: Supine to sitting  Level of Assistance 1: Minimum assistance (VCs to instruct log roll)  Bed Mobility Comments 1: HOB elevated    Transfers  Transfer: Yes  Transfer 1  Transfer From 1: Sit to, Stand to  Transfer to 1: Stand, Sit  Technique 1: Sit to stand, Stand to sit  Transfer Device 1: Walker  Transfer Level of Assistance 1: Contact guard  Extremity/Trunk Assessments:  Strength RLE  RLE Overall Strength: Greater than or equal to 3/5 as evidenced by functional mobility  Strength LLE  LLE Overall Strength: Greater than or equal to 3/5 as evidenced by functional mobility  Treatments:  Ambulation/Gait Training  Ambulation/Gait Training Performed: Yes  Ambulation/Gait Training 1  Surface 1: Level tile  Device 1: Rolling walker  Assistance 1: Close supervision  Quality of Gait 1: Wide base of support (dec angela)  Comments/Distance (ft) 1: 150ft x2  Outcome Measures:  [facility] Basic Mobility  Turning from your back to your side while in a flat bed without using bedrails: A little  Moving from lying on your back to sitting on the side of a flat bed without using bedrails: A little  Moving to and from bed to chair (including a wheelchair): A little  Standing up from a chair using your arms (e.g. wheelchair or bedside chair): A little  To walk in hospital room: A little  Climbing 3-5 steps with railing: A lot  Basic Mobility - Total Score: 17    Encounter Problems       Encounter Problems (Active)       Balance       Pt will score >/=24 on the Tinetti to indicate low fall risk        Start:  06/25/24     Expected End:  07/09/24               Mobility       Pt will amb >500ft with LRAD and supervision in prep for safe discharge home        Start:  06/25/24    Expected End:  07/09/24            Pt will complete bed mobility independently        Start:  06/25/24    Expected End:  07/09/24            Pt will a/descend 12 steps with B rail with supervision in prep for safe home mobility        Start:  06/25/24    Expected End:  07/09/24               PT Transfers       Pt will transfer sit to stand with LRAD and supervision in prep for out of bed mobility        Start:  06/25/24    Expected End:  07/09/24                   Education Documentation  Precautions, taught by KEENA Moran at 6/25/2024  3:31 PM.  Learner: Patient  Readiness: Acceptance  Method: Explanation  Response: Verbalizes Understanding  Comment: spinal precautions    Body Mechanics, taught by KEENA Moran at 6/25/2024  3:31 PM.  Learner: Patient  Readiness: Acceptance  Method: Explanation  Response: Verbalizes Understanding  Comment: spinal precautions    Mobility Training, taught by KEENA Moran at 6/25/2024  3:31 PM.  Learner: Patient  Readiness: Acceptance  Method: Explanation  Response: Verbalizes Understanding  Comment: spinal precautions    Education Comments  No comments found.

## 2024-06-25 NOTE — OP NOTE
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 L4-5 posterior arthrodesis  2. L4-5 posterior segmental instrumented fusion with Medtronic Solera screw and marysol system  3. L4-5 transforaminal lumbar interbody fusion with Medtronic Catalyft Titanium expandible interbody cage.  4. L4-5 laminectomy with foraminotomy, Left L4-5 complete facetectomy and right partial facetectomy with the purpose of decompression and direct visualization of the thecal sac and neural elements and complete release of the left L4 nerve root.  5. Application of bone autograft harvested from laminectomy and Medtronic Aurora Allograft Fibers  6. Use of O-arm and stealth navigation for pedicle screw placement      Surgeons      * Honey Gonzalez - Primary    Resident/Fellow/Other Assistant:  Surgeons and Role:     * Jass Thayer DO - Resident - Assisting    Procedure Summary  Anesthesia: General  ASA: II  Anesthesia Staff: Anesthesiologist: Kam Tavarez MD  CRNA: KAYDEN Jones-SWAPNIL  C-AA: JUNO Méndez  Estimated Blood Loss: 150 mL  Intra-op Medications:   Administrations occurring from 0715 to 1230 on 24:    Medication Name Total Dose   polymyxin B injection 500,000 Units   sodium chloride 0.9 % irrigation solution 1,000 mL   thrombin (recombinant) (Recothrom) topical solution 5,000 Units   gelatin absorbable (Gelfoam) 100 sponge 1 each   vancomycin (Vancocin) vial for injection 1 g   hydromorphone PCA 0.5 mg/mL in NS opioid naive Cannot be calculated   methocarbamol (Robaxin) injection 1,000 mg 1,000 mg   HYDROmorphone (Dilaudid) injection 0.5 mg 1 mg              Anesthesia Record               Intraprocedure I/O Totals          Intake    Tranexamic Acid 10.00 mL    The total shown is the total volume documented since Anesthesia Start was filed.    LR infusion 900.00 mL    albumin human bottle 5% 250.00 mL    Total Intake 1160 mL       Output    Urine 210 mL    Est. Blood Loss 150 mL    Total Output 360 mL       Net    Net Volume 800 mL          Specimen: No specimens collected     Staff:   Circulator: Jen  Scrub Person: Zina         Drains and/or Catheters:   Open Drain Medial Back (Active)   Dressing Status Clean;Dry 06/25/24 1055   Drainage Appearance Serosanguineous 06/25/24 1055       Urethral Catheter Non-latex 16 Fr. (Active)   Site Assessment Clean;Skin intact 06/25/24 1528   Collection Container Standard drainage bag 06/25/24 1309   Securement Method Securing device (Describe) 06/25/24 1309   Reason for Continuing Urinary Catheterization surgical procedures: urological/gynecological, pelvic oncology, anal, prolonged surgical procedure 06/25/24 1309       Tourniquet Times: n/a        Implants:  Implants       Type Name Action Serial No.      Spinal Hardware SCREW, SOLERA 5.5/6.0 ATS, 7.5 X 50 - [id number] Implanted      Screw SET SCREW, 5.5, TI NS BRK OFF - [id number] Implanted      Spinal Hardware PUTTY, GODFREY  10CC - [id number] Implanted [id number]     Spinal Hardware PUTTY, GODFREY  10CC - [id number] - [id number] Implanted [id number]     Spinal Hardware SPACER, GAUDENCIO PL, 7MM, SHORT - [id number]  Implanted      Spinal Hardware AVE, 5.5 X 40 CVD TI [facility] - [id number] Implanted               Findings: Severe left L4-5 foraminal stenosis     Indications: Alfa Cast is an 64 y.o. male who is having surgery for left lumbar radiculopathy.  He has MRI demonstrating severe foraminal stenosis at L4-L5 secondary to a disc protrusion in the left foramen.  He has undergone conservative treatments including physical therapy, medical management as well as epidural steroid injections with only temporary relief.  We discussed the option of surgery in the form of complete facetectomy on the left at L4-L5 to decompress the foramen however this would create iatrogenic instability and therefore will require instrumented fusion with an L4-L5 posterior lateral fusion with transforaminal interbody fusion.  Goal is to decompress the neural elements and help with his radicular leg pain. I discussed the risks of surgery which includes but not limited to infection at the surgical site or wound healing requiring additional surgery to clean the infection and long term IV antibiotics. There is risk of blood loss requiring blood transfusion. Risk of dural tear requiring repair and possible continue cerebral spinal fluid leakage and positional headaches. Risk of Nerve root injury resulting in temporary or permeant weakness, numbness, or radicular pain. Risk of epidural hematoma and spinal cord compression resulting in weakness in extremity and bowel and bladder disturbances requiring additional surgery to clear out hematoma. Risk of nonunion or hardware failure requiring additional surgery to correct this. Risk of adjacent level disease in the future requiring additional surgery in the future to address this. Risk of needing to remain intubated after surgery for facial swelling. Risk of blindness if need to be in a prone position for surgery. Other complications include skin breakdown or skin blistering from being prone for long hours,  developing MI, stroke, DVT, PE during or after surgery. Risk of death. Risk of complications from anesthesia requiring prolong intubation. Risk of shimon coronavirus and the complications associated with that while in the hospital. The patient concurred with the proposed plan, giving informed consent.  The site of surgery was properly noted/marked if necessary per policy. The patient has been actively warmed in preoperative area. Preoperative antibiotics have been ordered and given within 1 hours of incision. Venous thrombosis prophylaxis are not indicated.    Procedure Details: The patient was identified in the preoperative holding area. Patient was identified by name, medical record number, and date of birth. I reviewed the consent form with the patient and answered any final questions as well as discussed expectation postoperatively. The patient was brought to the operating room by anesthesia. A huddle was performed to verify and identify the patient's name, medical record number, date of birth, and procedure as well as the antibiotics to be administered. Once everyone was in agreement, the patient was administered general anesthesia and was intubated without any difficulties. Intravenous antibiotics in the form of Ancef was administered for surgical prophylaxis along with tranexamic acid to minimize blood loss.  A Serrato catheter was placed once the patient was asleep. Sequential compressive devices were applied to his lower extremities. The patient was transferred on to the Nicanor frame in the prone position with full spine precautions. I ensured that all bony prominences were well carefully padded. The patient's lumbosacral spine was carefully prepped and draped in the usual sterile fashion.     A pre-incision pause was performed where we again verified the patient's name, medical record number, and date of birth, and procedure to be performed as well as administration of antibiotic. Once everyone was in  agreement, we proceeded with the surgery. A midline longitudinal incision was made from L4-5. I performed a subperiosteal exposure from the level of the L4-5 transverse process.  The O-arm and Stealth navigation was brought into her room in a CT imaging of the spine was obtained.  I was able to verify and localize our levels using navigation.    I then proceeded to place pedicle screws bilaterally at L4-L5. The pedicle screws were placed by establishing starting points at the junction of the superior facet and the transverse process and then cannulating the pedicles with the use of a pedicle finder.  I then a used a ball-tip probe to feel for circumferential bone within the pedicle screw tract in order to minimize the likelihood of a dangerous bony breach. Screws of appropriate length and diameter were placed based on intraoperative as well as preoperative imaging. Pedicle screws were placed with good bony purchase under navigation.      I then proceeded with our laminectomy and decompression.  Using rongeur I removed the spinous process and lamina of L4. The entire spinous process and lamina of L4 was removed by piece meal. There was hypertrophic ligamentum flavum at L4-L5.  The remaining lamina which was removed with kerrison rongeurs. Ligamentum along the lateral recess was removed. Partial facetectomy removing the over hanging facets was completed with kerrison rongeurs at each level. Foraminotomy was performed using kerrison rongeurs to remove the facet overhang and ligamentum flavum. I was able to easily slide my fausto elevator through the foramen and along the transversing nerve roots.     Once decompressed I proceeded with the transforaminal posterior lumbar interbody fusion at L4-5 in order to improve fusion and to indirectly decompress the neural foramina. Since the patient's symptom were all on the left I performed a complete facetectomy on the left by removing the inferior facet of L4. Once the  inferior facet was removed I used kerrison rongeurs to remove the remaining facet.   A nerve root retractor was used to retract the  thecal sac medially. I then proceeded with the transforaminal lumbar interbody fusion. I used a bipolar electrocautery to cauterize epidural vessel. I identified the L4-5 disc space using navigation. I used a nerve retractor to retract the thecal sac off the disk space. An annulotomy was performed at the disc space. I used a pituitary to remove any remaining disk. I used a shaver starting with size 6 shaver and worked my way up to a  12 shaver in order to shave the endplates of L4 and L5. I used pituitary to remove any loose disk material after shaving with each shaver. I used a down angle curette to remove any remaining collagenous attachments on the endplate. I then made sure that the endplates were well prepared. I then placed autograft bone from our decompression into the disk space using a bone impactor. I then placed an expandible cage into the disc space and expanded the cage until it torqued out. I packed the cage with bone graft. I confirmed appropriate alignment on radiographic views which showed acceptable position, the wound was then thoroughly irrigated with normal saline.     I then proceeded with completion of our posterior segmental instrumentation. I contoured 2 rods and matched the patient's lumbosacral alignment. I then secured them sequentially to the L4-5 screws. I ensured that connector screws and rods were tightened and torqued appropriately and compressed across the L4-5 screws. All the screws were torqued appropriately.      I thoroughly irrigated the wound with normal saline. I then proceeded with an L4-5 posterior arthrodesis. The posterolateral elements between L4-5 were thoroughly decorticated with the use of a tyree. Bone graft mixture consisting of the patient's autograft bone from the laminectomy as well as Charles allograft putty was mixed and applied  over the decorticated L4-5 posterolateral bony element as well as the facet joints. I once again checked our nerve roots to make sure that they were thoroughly decompressed. I was able to glide a Joyce through the foramen easily without any resistance.    Hemostasis was then secured. A 15 Romanian Hemovac drain was placed. I applied 1 g of vancomycin powder into the wound. A layered closure was performed over the drain. I closed the fascia with 0 Vicryl followed by subdermal with 2-0 Vicryl and then the skin with 2-0 nylon with dermabond for skin. Sterile adherent dressing was applied. The patient was gently rolled into the supine position on to hospital bed. Patient was awoken from anesthesia and extubated without any difficulties. Patient was transferred out of the operating room to the postoperative holding area in the stable condition.     All the sponges and needle counts were correct at the completion of the procedure. There was no immediate complication noted. I was present and scrubbed in for the entire case.     POSTOPERATIVE PLAN: The patient will be admitted to the hospital and will receive 24 hours of antibiotics postoperatively We will obtain upright x-rays of the lumbar spine prior to discharge. Patient will work with PT and OT on post op day 1. We will monitor drain output and will removed prior to discharge once output is low. Patient will follow up in 3 weeks. Patient was instructed to avoid lifting, pushing, pulling more than 10lbs or any strenuous activities.        Complications:  None; patient tolerated the procedure well.    Disposition: PACU - hemodynamically stable.  Condition: stable         Attending Attestation: I was present and scrubbed for the entire procedure.    Honey Gonzalez  Phone Number: [phone]

## 2024-06-25 NOTE — DISCHARGE INSTR - OTHER ORDERS
Wound Care  -Wound site: Back (Lumbar Spine)  -Wound Type: Surgical Incision  -Change the dressing daily and continue until the incision is no longer draining.          ~Once the incision  has been dry for 48 hours, you may leave the dressing off and the site open to air.  -Cover the wound with an abdominal pad and secure it with paper tape around the edges.  -If there are sutures in your incision, they will be removed at your postoperative follow up appointment.  -No Lotions or Creams on or around the incision site.  -No tub soaks  -You may use heat or ice to your incision sites and or back as needed for comfort.      **No NSAIDS (Advil, Ibuprofen, Alevel, Etc.) for 3 months, they may interfere with the healing of the fusion.

## 2024-06-25 NOTE — PROGRESS NOTES
Pharmacy Medication History Review    Alfa Cast is a 64 y.o. male admitted for Lumbar radiculopathy, chronic. Pharmacy reviewed the patient's [id number] medications and allergies for accuracy.    The list below reflects the updated PTA list.   Comments regarding how patient may be taking medications differently can be found in the Admit Orders Activity  Prior to Admission Medications   Prescriptions Last Dose Informant Patient Reported?   albuterol (Ventolin HFA) 90 mcg/actuation inhaler 6/24/2024 Self No   Sig: Inhale 2 puffs every 4 hours if needed for wheezing or shortness of breath.   aspirin 81 mg EC tablet Past Week Self Yes   Sig: Take 1 tablet (81 mg) by mouth once daily.   atorvastatin (Lipitor) 80 mg tablet 6/24/2024 Self No   Sig: Take 1 tablet (80 mg) by mouth once daily.   chlorhexidine (Peridex) 0.12 % solution 6/25/2024 Self No   Sig: Swish and spit 15 mL night before surgery and morning of surgery   methocarbamol (Robaxin) 500 mg tablet Not Taking  No   Sig: Take 1 tablet (500 mg) by mouth 3 times a day as needed for muscle spasms.   Patient not taking: Reported on 6/25/2024   multivitamin tablet Past Week Self Yes   Sig: Take 1 tablet by mouth once daily.   omeprazole (PriLOSEC) 20 mg DR capsule 6/24/2024 Self Yes   Sig: Take 1 capsule (20 mg) by mouth once daily. Do not crush or chew.   tadalafil (Cialis) 5 mg tablet 6/20/2024 Self Yes   Sig: Take 1 tablet (5 mg) by mouth once daily. May take up to 4 tablets (20 mg) per day on demand for intercourse.      Facility-Administered Medications: None        The list below reflects the updated allergy list. Please review each documented allergy for additional clarification and justification.  Allergies  Reviewed by Yann Silva RPh on 6/25/2024        Severity Reactions Comments    Penicillins Not Specified Unknown     Bupropion Low Rash           M2B service not offered prior to surgery, please reassess prior to patient discharge if Meds to  "Beds is desired.  Local Pharmacy: [facility] [place]      Sources:   Pt interview - knows medications well  Dispense hx  OARRS     Additional Comments:  None       Yann Silva PharmD  Transitions of Care Pharmacist  06/25/24     Secure Chat preferred   If no response call [phone] or Vocera \"Med Rec\"   "

## 2024-06-25 NOTE — ANESTHESIA PROCEDURE NOTES
Peripheral IV  Date/Time: 6/25/2024 7:20 AM  Inserted by: JUNO Méndez    Placement  Needle size: 18 G  Laterality: left  Location: wrist  Site prep: alcohol  Technique: anatomical landmarks  Attempts: 1

## 2024-06-25 NOTE — DISCHARGE INSTRUCTIONS
**Please call Anna Menjivar RN (at [phone]) or Dr. Honey Gonzalez (at [phone]) for any          postoperative questions or concerns.

## 2024-06-25 NOTE — ANESTHESIA PREPROCEDURE EVALUATION
Patient: Alfa Cast    Procedure Information       Date/Time: 06/25/24 0715    Procedure: Fusion Spine Posterior Lumbar with Navigation (Spine Lumbar)    Location: [facility] OR 07 / [facility] OR    Surgeons: Honey Gonzalez MD            Relevant Problems   Cardiac   (+) Coronary arteriosclerosis      Neuro   (+) Anxiety   (+) Depression   (+) Lumbar radiculopathy   (+) Lumbar radiculopathy, chronic      GI   (+) Gastroesophageal reflux disease with esophagitis      /Renal   (+) Prostate cancer (Multi)   (+) Urinary tract infection      Musculoskeletal   (+) Degeneration of intervertebral disc of lumbar region   (+) Primary osteoarthritis of left hip      ID   (+) Urinary tract infection      Skin   (+) Atopic dermatitis       Clinical information reviewed:   Tobacco  Allergies  Meds   Med Hx  Surg Hx   Fam Hx  Soc Hx        NPO Detail:  NPO/Void Status  Carbohydrate Drink Given Prior to Surgery? : N  Date of Last Liquid: 06/24/24  Time of Last Liquid: 2300  Date of Last Solid: 06/24/24  Time of Last Solid: 2300  Last Intake Type: Clear fluids  Time of Last Void: 0608         Physical Exam    Airway  Mallampati: II  TM distance: >3 FB  Neck ROM: full     Cardiovascular - normal exam     Dental   (+) upper dentures     Pulmonary    Abdominal - normal exam             Anesthesia Plan    History of general anesthesia?: yes  History of complications of general anesthesia?: no    ASA 2     general     intravenous induction   Anesthetic plan and risks discussed with patient.    Plan discussed with CAA.

## 2024-06-26 ENCOUNTER — APPOINTMENT (OUTPATIENT)
Dept: RADIOLOGY | Facility: HOSPITAL | Age: 64
End: 2024-06-26
Payer: MEDICARE

## 2024-06-26 LAB
ANION GAP SERPL CALC-SCNC: 13 MMOL/L (ref 10–20)
BUN SERPL-MCNC: 12 MG/DL (ref 6–23)
CALCIUM SERPL-MCNC: 8.9 MG/DL (ref 8.6–10.6)
CHLORIDE SERPL-SCNC: 105 MMOL/L (ref 98–107)
CO2 SERPL-SCNC: 24 MMOL/L (ref 21–32)
CREAT SERPL-MCNC: 0.65 MG/DL (ref 0.5–1.3)
EGFRCR SERPLBLD CKD-EPI 2021: >90 ML/MIN/1.73M*2
ERYTHROCYTE [DISTWIDTH] IN BLOOD BY AUTOMATED COUNT: 14.9 % (ref 11.5–14.5)
GLUCOSE SERPL-MCNC: 135 MG/DL (ref 74–99)
HCT VFR BLD AUTO: 34.7 % (ref 41–52)
HGB BLD-MCNC: 11.6 G/DL (ref 13.5–17.5)
MCH RBC QN AUTO: 30.4 PG (ref 26–34)
MCHC RBC AUTO-ENTMCNC: 33.4 G/DL (ref 32–36)
MCV RBC AUTO: 91 FL (ref 80–100)
NRBC BLD-RTO: 0 /100 WBCS (ref 0–0)
PLATELET # BLD AUTO: 233 X10*3/UL (ref 150–450)
POTASSIUM SERPL-SCNC: 4 MMOL/L (ref 3.5–5.3)
RBC # BLD AUTO: 3.82 X10*6/UL (ref 4.5–5.9)
SODIUM SERPL-SCNC: 138 MMOL/L (ref 136–145)
WBC # BLD AUTO: 11.7 X10*3/UL (ref 4.4–11.3)

## 2024-06-26 PROCEDURE — 97535 SELF CARE MNGMENT TRAINING: CPT | Mod: GO

## 2024-06-26 PROCEDURE — 2500000004 HC RX 250 GENERAL PHARMACY W/ HCPCS (ALT 636 FOR OP/ED): Performed by: PHYSICAL THERAPIST

## 2024-06-26 PROCEDURE — 36415 COLL VENOUS BLD VENIPUNCTURE: CPT | Performed by: PHYSICAL THERAPIST

## 2024-06-26 PROCEDURE — 80048 BASIC METABOLIC PNL TOTAL CA: CPT | Performed by: PHYSICAL THERAPIST

## 2024-06-26 PROCEDURE — 72100 X-RAY EXAM L-S SPINE 2/3 VWS: CPT | Performed by: RADIOLOGY

## 2024-06-26 PROCEDURE — 2500000002 HC RX 250 W HCPCS SELF ADMINISTERED DRUGS (ALT 637 FOR MEDICARE OP, ALT 636 FOR OP/ED): Performed by: PHYSICAL THERAPIST

## 2024-06-26 PROCEDURE — 72100 X-RAY EXAM L-S SPINE 2/3 VWS: CPT

## 2024-06-26 PROCEDURE — RXMED WILLOW AMBULATORY MEDICATION CHARGE

## 2024-06-26 PROCEDURE — 7100000011 HC EXTENDED STAY RECOVERY HOURLY - NURSING UNIT

## 2024-06-26 PROCEDURE — 85027 COMPLETE CBC AUTOMATED: CPT | Performed by: PHYSICAL THERAPIST

## 2024-06-26 PROCEDURE — 97116 GAIT TRAINING THERAPY: CPT | Mod: GP,CQ

## 2024-06-26 PROCEDURE — 97530 THERAPEUTIC ACTIVITIES: CPT | Mod: GP,CQ

## 2024-06-26 PROCEDURE — 1100000001 HC PRIVATE ROOM DAILY

## 2024-06-26 PROCEDURE — 97165 OT EVAL LOW COMPLEX 30 MIN: CPT | Mod: GO

## 2024-06-26 PROCEDURE — 2500000001 HC RX 250 WO HCPCS SELF ADMINISTERED DRUGS (ALT 637 FOR MEDICARE OP): Performed by: PHYSICAL THERAPIST

## 2024-06-26 RX ORDER — ACETAMINOPHEN 160 MG/5ML
650 SOLUTION ORAL EVERY 4 HOURS PRN
Status: DISCONTINUED | OUTPATIENT
Start: 2024-06-26 | End: 2024-06-28 | Stop reason: HOSPADM

## 2024-06-26 RX ORDER — METHOCARBAMOL 500 MG/1
TABLET, FILM COATED ORAL
Qty: 60 TABLET | Refills: 2 | Status: SHIPPED | OUTPATIENT
Start: 2024-06-26

## 2024-06-26 RX ORDER — OXYCODONE HYDROCHLORIDE 5 MG/1
5 TABLET ORAL EVERY 4 HOURS PRN
Qty: 42 TABLET | Refills: 0 | Status: SHIPPED | OUTPATIENT
Start: 2024-06-26 | End: 2024-07-05

## 2024-06-26 RX ORDER — ACETAMINOPHEN 500 MG
1000 TABLET ORAL EVERY 8 HOURS
COMMUNITY
Start: 2024-06-26 | End: 2024-07-10

## 2024-06-26 RX ORDER — OXYCODONE HYDROCHLORIDE 5 MG/1
10 TABLET ORAL EVERY 4 HOURS PRN
Status: DISCONTINUED | OUTPATIENT
Start: 2024-06-26 | End: 2024-06-28 | Stop reason: HOSPADM

## 2024-06-26 RX ORDER — OXYCODONE HYDROCHLORIDE 5 MG/1
5 TABLET ORAL EVERY 4 HOURS PRN
Status: DISCONTINUED | OUTPATIENT
Start: 2024-06-26 | End: 2024-06-28 | Stop reason: HOSPADM

## 2024-06-26 RX ORDER — ACETAMINOPHEN 325 MG/1
650 TABLET ORAL EVERY 4 HOURS PRN
Status: DISCONTINUED | OUTPATIENT
Start: 2024-06-26 | End: 2024-06-28 | Stop reason: HOSPADM

## 2024-06-26 RX ORDER — POLYETHYLENE GLYCOL 3350 17 G/17G
17 POWDER, FOR SOLUTION ORAL 2 TIMES DAILY PRN
COMMUNITY
Start: 2024-06-26 | End: 2024-07-26

## 2024-06-26 RX ORDER — ACETAMINOPHEN 650 MG/1
650 SUPPOSITORY RECTAL EVERY 4 HOURS PRN
Status: DISCONTINUED | OUTPATIENT
Start: 2024-06-26 | End: 2024-06-28 | Stop reason: HOSPADM

## 2024-06-26 ASSESSMENT — COGNITIVE AND FUNCTIONAL STATUS - GENERAL
CLIMB 3 TO 5 STEPS WITH RAILING: A LITTLE
DRESSING REGULAR UPPER BODY CLOTHING: A LITTLE
MOBILITY SCORE: 18
DRESSING REGULAR LOWER BODY CLOTHING: A LITTLE
TURNING FROM BACK TO SIDE WHILE IN FLAT BAD: A LITTLE
MOVING TO AND FROM BED TO CHAIR: A LITTLE
DAILY ACTIVITIY SCORE: 23
MOBILITY SCORE: 23
TOILETING: A LITTLE
DRESSING REGULAR LOWER BODY CLOTHING: A LITTLE
MOVING FROM LYING ON BACK TO SITTING ON SIDE OF FLAT BED WITH BEDRAILS: A LITTLE
STANDING UP FROM CHAIR USING ARMS: A LITTLE
HELP NEEDED FOR BATHING: A LITTLE
CLIMB 3 TO 5 STEPS WITH RAILING: A LITTLE
WALKING IN HOSPITAL ROOM: A LITTLE
DAILY ACTIVITIY SCORE: 20

## 2024-06-26 ASSESSMENT — PAIN - FUNCTIONAL ASSESSMENT
PAIN_FUNCTIONAL_ASSESSMENT: 0-10

## 2024-06-26 ASSESSMENT — ACTIVITIES OF DAILY LIVING (ADL)
HOME_MANAGEMENT_TIME_ENTRY: 9
ADL_ASSISTANCE: INDEPENDENT
BATHING_ASSISTANCE: STAND BY

## 2024-06-26 ASSESSMENT — PAIN SCALES - GENERAL
PAINLEVEL_OUTOF10: 7
PAINLEVEL_OUTOF10: 8
PAINLEVEL_OUTOF10: 3
PAINLEVEL_OUTOF10: 7
PAINLEVEL_OUTOF10: 1
PAINLEVEL_OUTOF10: 1
PAINLEVEL_OUTOF10: 2

## 2024-06-26 NOTE — PROGRESS NOTES
Occupational Therapy    Evaluation/Treatment    Patient Name: Alfa Cast  MRN: [id number]  : 1960  Today's Date: 24  Time Calculation  Start Time: 901  Stop Time: 925  Time Calculation (min): 24 min     Assessment:  OT Assessment: Pt will benefit from continued skilled acute OT to increase independence in ADLs, functional mobility, activity tolerance, and safety.  Prognosis: Excellent  Barriers to Discharge: None  Evaluation/Treatment Tolerance: Patient tolerated treatment well  Medical Staff Made Aware: Yes  End of Session Communication: Bedside nurse  End of Session Patient Position: Up in chair, Alarm off, not on at start of session  OT Assessment Results: Decreased ADL status, Decreased safe judgment during ADL, Decreased endurance, Decreased functional mobility, Decreased IADLs  Prognosis: Excellent  Barriers to Discharge: None  Evaluation/Treatment Tolerance: Patient tolerated treatment well  Medical Staff Made Aware: Yes  Strengths: Attitude of self  Barriers to Participation: Comorbidities  Plan:  Treatment Interventions: ADL retraining, Functional transfer training, UE strengthening/ROM, Endurance training, Equipment evaluation/education, Compensatory technique education  OT Frequency: 2 times per week  OT Discharge Recommendations: No OT needed after discharge  Equipment Recommended upon Discharge:  (hip kit)  OT Recommended Transfer Status: Assist of 1  OT - OK to Discharge: Yes (upon medical clearance)  Treatment Interventions: ADL retraining, Functional transfer training, UE strengthening/ROM, Endurance training, Equipment evaluation/education, Compensatory technique education    Subjective   Current Problem:  1. Lumbar radiculopathy, chronic        2. 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  Family/Caregiver Present: No  Prior to Session Communication: Bedside nurse  Patient Position Received: Alarm off, not on at start of session (standing in room)  Preferred Learning Style: verbal, visual  General Comment: Pt standing in room upon arrival, agreeable to OT  Precautions:  Medical Precautions: Fall precautions  Post-Surgical Precautions: Spinal precautions    Pain:  Pain Assessment  Pain Assessment: 0-10  0-10 (Numeric) Pain Score: 1  Pain Type: Acute pain, Surgical pain  Pain Location: Back  Pain Interventions: Repositioned, Distraction    Objective   Cognition:  Overall Cognitive Status: Within Functional Limits  Orientation Level: Oriented X4  Insight: Within function limits  Impulsive: Within functional limits  Processing Speed: Within funtional limits     Home Living:  Type of Home: House  Lives With: Adult children (adult son (26 yo))  Home Adaptive Equipment: Crutches, Reacher (walking sticks)  Home Layout: Laundry in basement, One level  Home Access: Stairs to enter with rails  Entrance Stairs-Rails: Both  Entrance Stairs-Number of Steps: 3  Bathroom Shower/Tub: Tub/shower unit  Bathroom Equipment: Raised toilet seat with rails, Tub transfer bench  Prior Function:  Level of Marvin: Independent with ADLs and functional transfers, Independent with homemaking with ambulation  ADL Assistance: Independent  Homemaking Assistance: Independent  Ambulatory Assistance: Independent  Vocational: Retired  Leisure: ride motorcycles  IADL History:  Homemaking Responsibilities: Yes  Meal Prep Responsibility: Primary  Laundry Responsibility: Primary  Cleaning Responsibility: Primary  Bill Paying/Finance Responsibility: Primary  Shopping Responsibility: Primary  Current License: Yes  Mode of Transportation: Car  Occupation:  Retired  Type of Occupation:   Leisure and Hobbies: ride motorcycles  ADL:  Eating Assistance: Independent (anticipated)  Grooming Assistance: Independent (anticipated)  Bathing Assistance: Stand by (anticipated seated)  UE Dressing Assistance: Stand by (anticipated seated)  LE Dressing Assistance: Stand by  Toileting Assistance with Device: Stand by (anticipated)  Activities of Daily Living:      LE Dressing  LE Dressing: Yes  LE Dressing Adaptive Equipment: Reacher, Sock aide  Pants Level of Assistance: Close supervision  Sock Level of Assistance: Close supervision  LE Dressing Where Assessed: Chair  LE Dressing Comments: doffed and donned L sock seated in chair SBA sock aide, donned pants over B feet seated in chair reacher SBA, STS SBA to rivas over hips SBA    Activity Tolerance:  Endurance: Endurance does not limit participation in activity    Bed Mobility/Transfers: Bed Mobility  Bed Mobility: No    Transfers  Transfer: Yes  Transfer 1  Transfer From 1: Stand to, Sit to  Transfer to 1: Sit, Stand  Technique 1: Sit to stand, Stand to sit  Transfer Level of Assistance 1:  (SBA)  Trials/Comments 1: x2    Functional Mobility:  Functional Mobility  Functional Mobility Performed: Yes  Functional Mobility 1  Surface 1: Level tile  Device 1: No device  Assistance 1:  (SBA)  Comments 1: fxnl mob min household distance around room SBA, no AD, no LOB  Sitting Balance:  Static Sitting Balance  Static Sitting-Balance Support: Feet supported  Static Sitting-Level of Assistance: Independent  Dynamic Sitting Balance  Dynamic Sitting-Balance Support: Feet supported  Dynamic Sitting-Comments: Indpendent  Standing Balance:  Static Standing Balance  Static Standing-Balance Support: No upper extremity supported  Static Standing-Comment/Number of Minutes: SBA  Dynamic Standing Balance  Dynamic Standing-Balance Support: No upper extremity supported  Dynamic Standing-Comments: SBA    Modalities:  Modalities Used:  No    Vision:Vision - Basic Assessment  Current Vision: Wears glasses all the time  Sensation:  Light Touch: No apparent deficits  Sharp/Dull: No apparent deficits  Stereognosis: No apparent deficits  Proprioception: No apparent deficits  Sensation Comment: denies N/T  Strength:  Strength Comments: BUE at least 3/5 grossly, observed through functional observation    Perception:  Inattention/Neglect: Appears intact  Initiation: Appears intact  Motor Planning: Appears intact  Perseveration: Not present  Coordination:  Movements are Fluid and Coordinated: Yes   Hand Function:  Hand Function  Gross Grasp: Functional  Coordination: Functional    Outcome Measures: [facility] Daily Activity  Putting on and taking off regular lower body clothing: A little  Bathing (including washing, rinsing, drying): A little  Putting on and taking off regular upper body clothing: A little  Toileting, which includes using toilet, bedpan or urinal: A little  Taking care of personal grooming such as brushing teeth: None  Eating Meals: None  Daily Activity - Total Score: 20     and OT Adult Other Outcome Measures  4AT: negative    Education Documentation  Handouts, taught by Babar Luis OT at 6/26/2024 10:22 AM.  Learner: Patient  Readiness: Acceptance  Method: Explanation, Demonstration, Handout  Response: Verbalizes Understanding    Body Mechanics, taught by Babar Luis OT at 6/26/2024 10:22 AM.  Learner: Patient  Readiness: Acceptance  Method: Explanation, Demonstration, Handout  Response: Verbalizes Understanding    Precautions, taught by Babar Luis OT at 6/26/2024 10:22 AM.  Learner: Patient  Readiness: Acceptance  Method: Explanation, Demonstration, Handout  Response: Verbalizes Understanding    ADL Training, taught by Babar Luis OT at 6/26/2024 10:22 AM.  Learner: Patient  Readiness: Acceptance  Method: Explanation, Demonstration, Handout  Response: Verbalizes Understanding    Education Comments  No comments  found.      Goals:  Encounter Problems       Encounter Problems (Active)       ADLs       Patient with complete lower body dressing with modified independent level of assistance donning and doffing all LE clothes  with PRN adaptive equipment while supported sitting and standing (Progressing)       Start:  06/26/24    Expected End:  07/17/24            Patient will complete toileting including hygiene clothing management/hygiene with modified independent level of assistance and grab bars. (Progressing)       Start:  06/26/24    Expected End:  07/17/24               BALANCE       Pt will maintain dynamic standing balance during ADL task with modified independent level of assistance in order to demonstrate decreased risk of falling and improved postural control. (Progressing)       Start:  06/26/24    Expected End:  07/17/24               MOBILITY       Patient will perform Functional mobility max Household distances/Community Distances with modified independent level of assistance and least restrictive device in order to improve safety and functional mobility. (Progressing)       Start:  06/26/24    Expected End:  07/17/24               TRANSFERS       Patient will complete sit to stand transfer with modified independent level of assistance and least restrictive device in order to improve safety and prepare for out of bed mobility. (Progressing)       Start:  06/26/24    Expected End:  07/17/24               MITCH Holliday/L

## 2024-06-26 NOTE — PROGRESS NOTES
"Alfa Cast is a 64 y.o. male on day 1 of admission presenting with Lumbar radiculopathy, chronic.    Subjective   Patient examined at bedside.  Sore from walking around but overall feeling good.  Ready to go home and do what I need to so I recover well.        Objective     Physical Exam      C5: SILT   Deltoid 5/5 Left; 5/5 Right  C6: SILT   Wrist Ext: 5/5 Left; 5/5 Right  C7: SILT   Triceps: 5/5 Left; 5/5 Right  C8: SILT   Finger flexion: 5/5 Left; 5/5 Right  T1: SILT    Interossei: 5/5 Left; 5/5 Right        L1: SILT       L2: SILT      Hip flexors 5/5 Left; 5/5 Right  L3: SILT      Knee extension 5/5 Left; 5/5 Right  L4: SILT      Tib Ant. (Dorsiflexion) 5/5 Left; 5/5 Right  L5: SILT      EHL5/5 Left; 5/5 Right  S1: SILT      Planter flexion 5/5 Left; 5/5 Right    Drain intact with bloody output           Last Recorded Vitals  Blood pressure 128/74, pulse 70, temperature 36 °C (96.8 °F), temperature source Temporal, resp. rate 17, height 1.778 m (5' 10\"), weight 93 kg (205 lb), SpO2 95%.  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 Gonzalez on 6/25    Plan:  Ortho primary  WBAT of the UE and LE  No extreme motions of the lumbar spine  DVT ppx:  SCDs and ambulation  Ancef x 24 hours post operatively  Transition to oral meds on 6/26   Serrato removal on 6/26  Drain x 1.  Record Q8 output  Resume home medications  Regular diet  Decardron for 24 hours post operatively  PT/OT consult and rec [facility]   Standing upright XR L Spine when able. AP and " lateral view    Discussed with Dr. Carlos Thayer, PGY3    Spine team  Beverly Jordan, PGY2  Jass Thayer, PGY3    I saw and evaluated the patient.  I personally obtained the key and critical portions of the history and physical exam or was physically present for key and critical portions performed by the Resident. I reviewed the documentation and discussed the patient with the Resident.  I agree with the Resident’s medical decision making as documented in the note.      Discontinue PCA  Discontinue briscoe  PT/OT today  Standing upright XR L Spine   Advance diet as tolerated

## 2024-06-26 NOTE — PROGRESS NOTES
Physical Therapy    Physical Therapy Treatment    Patient Name: Alfa Cast  MRN: [id number]  Today's Date: 6/26/2024  Time Calculation  Start Time: 0955  Stop Time: 1034  Time Calculation (min): 39 min    Assessment/Plan   PT Assessment  End of Session Communication: Bedside nurse  Assessment Comment: .  End of Session Patient Position: Up in chair, Alarm off, not on at start of session     PT Plan  Treatment/Interventions: Bed mobility, Transfer training, Gait training, Stair training, Balance training, Strengthening, Therapeutic exercise, Therapeutic activity, Home exercise program  PT Plan: Ongoing PT  PT Frequency: Daily  PT Discharge Recommendations: Low intensity level of continued care  Equipment Recommended upon Discharge:  (tbd)  PT Recommended Transfer Status: Stand by assist, Assistive device  PT - OK to Discharge: Yes (eval complete and discharge recommendations made)      General Visit Information:   PT  Visit  PT Received On: 06/26/24  General  Prior to Session Communication: Bedside nurse  Patient Position Received: Alarm off, not on at start of session  General Comment: Pt standing in hallway upon entering. pt ambulates 2x200' completing 2nd trial with no AD. pt negotiates stairs with 1 hand rail and verbalized/demonstrates safe techniques    Subjective   Precautions:  Precautions  Medical Precautions: Fall precautions  Post-Surgical Precautions: Spinal precautions  Vital Signs:       Objective   Pain:  Pain Assessment  Pain Assessment: 0-10  0-10 (Numeric) Pain Score: 3  Pain Interventions: Ambulation/increased activity  Cognition:  Cognition  Orientation Level: Oriented X4  Coordination:    Treatments:            Ambulation/Gait Training 1  Surface 1: Level tile  Device 1: Rolling walker  Assistance 1: Close supervision  Quality of Gait 1: Wide base of support  Comments/Distance (ft) 1: 2x200, 1 trial no AD, increased cues to inhibit lateral sway  Transfer 1  Technique 1: Sit to stand, Stand to  sit  Transfer Device 1: Walker  Transfer Level of Assistance 1: Distant supervision  Trials/Comments 1: pt completing multiple trials in succession. cues for safe pacing    Stairs  Stairs: Yes  Stairs  Rails 1: Right  Curb Step 1: No  Device 1: Railing  Assistance 1: Close supervision  Comment/Number of Steps 1: 12. cues for sequencing    Outcome Measures:  [facility] Basic Mobility  Turning from your back to your side while in a flat bed without using bedrails: A little  Moving from lying on your back to sitting on the side of a flat bed without using bedrails: A little  Moving to and from bed to chair (including a wheelchair): A little  Standing up from a chair using your arms (e.g. wheelchair or bedside chair): A little  To walk in hospital room: A little  Climbing 3-5 steps with railing: A little  Basic Mobility - Total Score: 18    Education Documentation  Body Mechanics, taught by Keith Quiroga PTA at 6/26/2024 12:48 PM.  Learner: Patient  Readiness: Acceptance  Method: Explanation  Response: Verbalizes Understanding    Education Comments  No comments found.        OP EDUCATION:       Encounter Problems       Encounter Problems (Active)       Balance       Pt will score >/=24 on the Tinetti to indicate low fall risk  (Progressing)       Start:  06/25/24    Expected End:  07/09/24               Mobility       Pt will amb >500ft with LRAD and supervision in prep for safe discharge home  (Progressing)       Start:  06/25/24    Expected End:  07/09/24            Pt will complete bed mobility independently  (Progressing)       Start:  06/25/24    Expected End:  07/09/24            Pt will a/descend 12 steps with B rail with supervision in prep for safe home mobility  (Progressing)       Start:  06/25/24    Expected End:  07/09/24               PT Transfers       Pt will transfer sit to stand with LRAD and supervision in prep for out of bed mobility  (Progressing)       Start:  06/25/24    Expected End:  07/09/24

## 2024-06-26 NOTE — PROGRESS NOTES
I met with Alfa at the bedside regarding discharge planning and home going needs. Patient lives home with his son where he is independent with ADL's he does have a cane in the home. Patient is not medically cleared for discharge at this time pending drain removal recommended home with low intensity therapy AOC is [facility]. I will continue to follow with a safe discharge plan.

## 2024-06-26 NOTE — CARE PLAN
Problem: Pain  Goal: Takes deep breaths with improved pain control throughout the shift  Outcome: Progressing  Goal: Turns in bed with improved pain control throughout the shift  Outcome: Progressing  Goal: Walks with improved pain control throughout the shift  Outcome: Progressing  Goal: Performs ADL's with improved pain control throughout shift  Outcome: Progressing  Goal: Participates in PT with improved pain control throughout the shift  Outcome: Progressing  Goal: Free from opioid side effects throughout the shift  Outcome: Progressing  Goal: Free from acute confusion related to pain meds throughout the shift  Outcome: Progressing     Problem: Fall/Injury  Goal: Not fall by end of shift  Outcome: Progressing  Goal: Be free from injury by end of the shift  Outcome: Progressing  Goal: Verbalize understanding of personal risk factors for fall in the hospital  Outcome: Progressing  Goal: Verbalize understanding of risk factor reduction measures to prevent injury from fall in the home  Outcome: Progressing  Goal: Use assistive devices by end of the shift  Outcome: Progressing  Goal: Pace activities to prevent fatigue by end of the shift  Outcome: Progressing   The patient's goals for the shift include      The clinical goals for the shift include patient will have adequate pain control this shift

## 2024-06-27 LAB
ANION GAP SERPL CALC-SCNC: 11 MMOL/L (ref 10–20)
BUN SERPL-MCNC: 20 MG/DL (ref 6–23)
CALCIUM SERPL-MCNC: 9.2 MG/DL (ref 8.6–10.6)
CHLORIDE SERPL-SCNC: 104 MMOL/L (ref 98–107)
CO2 SERPL-SCNC: 30 MMOL/L (ref 21–32)
CREAT SERPL-MCNC: 0.66 MG/DL (ref 0.5–1.3)
EGFRCR SERPLBLD CKD-EPI 2021: >90 ML/MIN/1.73M*2
ERYTHROCYTE [DISTWIDTH] IN BLOOD BY AUTOMATED COUNT: 15.4 % (ref 11.5–14.5)
GLUCOSE SERPL-MCNC: 91 MG/DL (ref 74–99)
HCT VFR BLD AUTO: 34.9 % (ref 41–52)
HGB BLD-MCNC: 11.4 G/DL (ref 13.5–17.5)
MCH RBC QN AUTO: 30.6 PG (ref 26–34)
MCHC RBC AUTO-ENTMCNC: 32.7 G/DL (ref 32–36)
MCV RBC AUTO: 94 FL (ref 80–100)
NRBC BLD-RTO: 0 /100 WBCS (ref 0–0)
PLATELET # BLD AUTO: 208 X10*3/UL (ref 150–450)
POTASSIUM SERPL-SCNC: 4.3 MMOL/L (ref 3.5–5.3)
RBC # BLD AUTO: 3.72 X10*6/UL (ref 4.5–5.9)
SODIUM SERPL-SCNC: 141 MMOL/L (ref 136–145)
WBC # BLD AUTO: 10.5 X10*3/UL (ref 4.4–11.3)

## 2024-06-27 PROCEDURE — 2500000002 HC RX 250 W HCPCS SELF ADMINISTERED DRUGS (ALT 637 FOR MEDICARE OP, ALT 636 FOR OP/ED): Performed by: PHYSICAL THERAPIST

## 2024-06-27 PROCEDURE — 97530 THERAPEUTIC ACTIVITIES: CPT | Mod: GP,CQ

## 2024-06-27 PROCEDURE — 1100000001 HC PRIVATE ROOM DAILY

## 2024-06-27 PROCEDURE — 2500000004 HC RX 250 GENERAL PHARMACY W/ HCPCS (ALT 636 FOR OP/ED): Performed by: PHYSICAL THERAPIST

## 2024-06-27 PROCEDURE — 85027 COMPLETE CBC AUTOMATED: CPT | Performed by: PHYSICAL THERAPIST

## 2024-06-27 PROCEDURE — 36415 COLL VENOUS BLD VENIPUNCTURE: CPT | Performed by: PHYSICAL THERAPIST

## 2024-06-27 PROCEDURE — 2500000001 HC RX 250 WO HCPCS SELF ADMINISTERED DRUGS (ALT 637 FOR MEDICARE OP): Performed by: PHYSICAL THERAPIST

## 2024-06-27 PROCEDURE — 80048 BASIC METABOLIC PNL TOTAL CA: CPT | Performed by: PHYSICAL THERAPIST

## 2024-06-27 ASSESSMENT — PAIN - FUNCTIONAL ASSESSMENT
PAIN_FUNCTIONAL_ASSESSMENT: 0-10

## 2024-06-27 ASSESSMENT — PAIN SCALES - GENERAL
PAINLEVEL_OUTOF10: 4
PAINLEVEL_OUTOF10: 7
PAINLEVEL_OUTOF10: 5 - MODERATE PAIN
PAINLEVEL_OUTOF10: 3
PAINLEVEL_OUTOF10: 0 - NO PAIN
PAINLEVEL_OUTOF10: 7
PAINLEVEL_OUTOF10: 4
PAINLEVEL_OUTOF10: 7
PAINLEVEL_OUTOF10: 5 - MODERATE PAIN

## 2024-06-27 ASSESSMENT — COGNITIVE AND FUNCTIONAL STATUS - GENERAL
TURNING FROM BACK TO SIDE WHILE IN FLAT BAD: A LITTLE
MOVING FROM LYING ON BACK TO SITTING ON SIDE OF FLAT BED WITH BEDRAILS: A LITTLE
STANDING UP FROM CHAIR USING ARMS: A LITTLE
WALKING IN HOSPITAL ROOM: A LITTLE
MOVING TO AND FROM BED TO CHAIR: A LITTLE
MOBILITY SCORE: 18
CLIMB 3 TO 5 STEPS WITH RAILING: A LITTLE

## 2024-06-27 NOTE — PROGRESS NOTES
"Alfa Cast is a 64 y.o. male on day 1 of admission presenting with Lumbar radiculopathy, chronic.    Subjective   Patient examined at bedside. Radicular leg pain resolved. Stated he was able to ambulate without the walker which was great to feel. Denies N/T down the lower extremities.         Objective     Physical Exam      C5: SILT   Deltoid 5/5 Left; 5/5 Right  C6: SILT   Wrist Ext: 5/5 Left; 5/5 Right  C7: SILT   Triceps: 5/5 Left; 5/5 Right  C8: SILT   Finger flexion: 5/5 Left; 5/5 Right  T1: SILT    Interossei: 5/5 Left; 5/5 Right        L1: SILT       L2: SILT      Hip flexors 5/5 Left; 5/5 Right  L3: SILT      Knee extension 5/5 Left; 5/5 Right  L4: SILT      Tib Ant. (Dorsiflexion) 5/5 Left; 5/5 Right  L5: SILT      EHL5/5 Left; 5/5 Right  S1: SILT      Planter flexion 5/5 Left; 5/5 Right    Drain intact with bloody output   Dressing is c/d/I           Last Recorded Vitals  Blood pressure 136/72, pulse 71, temperature 36.1 °C (97 °F), temperature source Tympanic, resp. rate 16, height 1.778 m (5' 10\"), weight 93 kg (205 lb), SpO2 95%.  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 Gonzalez on 6/25    Plan:  Ortho primary  WBAT of the UE and LE  No extreme motions of the lumbar spine  DVT ppx:  SCDs and ambulation  Oral pain medication   Serrato removal on 6/26 and voiding  Drain x 1.  Record Q8 output, keep drain on self suction   home medications  Regular diet  PT/OT " consult and rec [facility]   Standing upright XR L Spine completed on 6/26. Hardware intact and appropriate    Discussed with Dr. Carlos Thayer, PGY3    Spine team  Beverly Jordan, PGY2  Jass Thayer, PGY3    I saw and evaluated the patient.  I personally obtained the key and critical portions of the history and physical exam or was physically present for key and critical portions performed by the Resident. I reviewed the documentation and discussed the patient with the Resident.  I agree with the Resident’s medical decision making as documented in the note.    Doing well. Ambulating with PT and sometimes without walker  Radicular leg pains resolved  Xrays reviewed with appropriate hardware fixation and alignment  Continue to monitor drain output. Keep on self suction

## 2024-06-27 NOTE — CARE PLAN
The patient's goals for the shift include    Problem: Pain  Goal: Takes deep breaths with improved pain control throughout the shift  Outcome: Progressing  Goal: Turns in bed with improved pain control throughout the shift  Outcome: Progressing  Goal: Walks with improved pain control throughout the shift  Outcome: Progressing  Goal: Performs ADL's with improved pain control throughout shift  Outcome: Progressing  Goal: Participates in PT with improved pain control throughout the shift  Outcome: Progressing  Goal: Free from opioid side effects throughout the shift  Outcome: Progressing  Goal: Free from acute confusion related to pain meds throughout the shift  Outcome: Progressing     Problem: Fall/Injury  Goal: Not fall by end of shift  Outcome: Progressing  Goal: Be free from injury by end of the shift  Outcome: Progressing  Goal: Verbalize understanding of personal risk factors for fall in the hospital  Outcome: Progressing  Goal: Verbalize understanding of risk factor reduction measures to prevent injury from fall in the home  Outcome: Progressing  Goal: Use assistive devices by end of the shift  Outcome: Progressing  Goal: Pace activities to prevent fatigue by end of the shift  Outcome: Progressing       The clinical goals for the shift include patient will rate 5/10 or less by end of shift

## 2024-06-27 NOTE — PROGRESS NOTES
Physical Therapy    Physical Therapy Treatment    Patient Name: Alfa Cast  MRN: [id number]  Today's Date: 6/27/2024  Time Calculation  Start Time: 0942  Stop Time: 0954  Time Calculation (min): 12 min    Assessment/Plan   PT Assessment  End of Session Communication: Bedside nurse  Assessment Comment: .  End of Session Patient Position: Up in chair, Alarm off, not on at start of session     PT Plan  Treatment/Interventions: Bed mobility, Transfer training, Gait training, Stair training, Balance training, Strengthening, Therapeutic exercise, Therapeutic activity, Home exercise program  PT Plan: Ongoing PT  PT Frequency: Daily  PT Discharge Recommendations: Low intensity level of continued care  Equipment Recommended upon Discharge:  (tbd)  PT Recommended Transfer Status: Stand by assist, Assistive device  PT - OK to Discharge: Yes (eval complete and discharge recommendations made)      General Visit Information:   PT  Visit  PT Received On: 06/27/24  General  Prior to Session Communication: Bedside nurse  Patient Position Received: Alarm off, not on at start of session  General Comment: Pt standing in hallway upon entering. pt ambulates 400' using wheeled walker. pt demonstrates continued unsteadiness and decreased step length    Subjective   Precautions:  Precautions  Medical Precautions: Fall precautions  Post-Surgical Precautions: Spinal precautions  Vital Signs:       Objective   Pain:  Pain Assessment  Pain Assessment: 0-10  0-10 (Numeric) Pain Score: 0 - No pain  Cognition:  Cognition  Orientation Level: Oriented X4       Treatments:            Ambulation/Gait Training 1  Surface 1: Level tile  Device 1: No device  Assistance 1: Close supervision  Quality of Gait 1: Wide base of support  Comments/Distance (ft) 1: 400', cues to inhibit lateral sway and increse knee extension in terminal swing to increase B step length  Transfer 1  Technique 1: Sit to stand, Stand to sit  Transfer Device 1: Walker  Transfer Level  of Assistance 1: Distant supervision  Trials/Comments 1: cues for hand palcement    Stairs  Stairs: Yes  Stairs  Rails 1: Right  Curb Step 1: No  Device 1: Railing  Assistance 1: Close supervision  Comment/Number of Steps 1: 12. cues for sequencing    Outcome Measures:  [facility] Basic Mobility  Turning from your back to your side while in a flat bed without using bedrails: A little  Moving from lying on your back to sitting on the side of a flat bed without using bedrails: A little  Moving to and from bed to chair (including a wheelchair): A little  Standing up from a chair using your arms (e.g. wheelchair or bedside chair): A little  To walk in hospital room: A little  Climbing 3-5 steps with railing: A little  Basic Mobility - Total Score: 18    Education Documentation  Mobility Training, taught by Ketih Quiroga PTA at 6/27/2024  9:58 AM.  Learner: Patient  Readiness: Acceptance  Method: Explanation  Response: Verbalizes Understanding    Education Comments  No comments found.        OP EDUCATION:       Encounter Problems       Encounter Problems (Active)       Balance       Pt will score >/=24 on the Tinetti to indicate low fall risk  (Progressing)       Start:  06/25/24    Expected End:  07/09/24               Mobility       Pt will amb >500ft with LRAD and supervision in prep for safe discharge home  (Progressing)       Start:  06/25/24    Expected End:  07/09/24            Pt will complete bed mobility independently  (Progressing)       Start:  06/25/24    Expected End:  07/09/24            Pt will a/descend 12 steps with B rail with supervision in prep for safe home mobility  (Progressing)       Start:  06/25/24    Expected End:  07/09/24               PT Transfers       Pt will transfer sit to stand with LRAD and supervision in prep for out of bed mobility  (Progressing)       Start:  06/25/24    Expected End:  07/09/24

## 2024-06-28 ENCOUNTER — HOME HEALTH ADMISSION (OUTPATIENT)
Dept: HOME HEALTH SERVICES | Facility: HOME HEALTH | Age: 64
End: 2024-06-28
Payer: MEDICARE

## 2024-06-28 ENCOUNTER — PHARMACY VISIT (OUTPATIENT)
Dept: PHARMACY | Facility: CLINIC | Age: 64
End: 2024-06-28
Payer: MEDICARE

## 2024-06-28 ENCOUNTER — DOCUMENTATION (OUTPATIENT)
Dept: HOME HEALTH SERVICES | Facility: HOME HEALTH | Age: 64
End: 2024-06-28
Payer: MEDICARE

## 2024-06-28 VITALS
TEMPERATURE: 97.9 F | HEART RATE: 68 BPM | HEIGHT: 70 IN | SYSTOLIC BLOOD PRESSURE: 138 MMHG | WEIGHT: 205 LBS | RESPIRATION RATE: 20 BRPM | DIASTOLIC BLOOD PRESSURE: 75 MMHG | BODY MASS INDEX: 29.35 KG/M2 | OXYGEN SATURATION: 94 %

## 2024-06-28 LAB
ANION GAP SERPL CALC-SCNC: 13 MMOL/L (ref 10–20)
BUN SERPL-MCNC: 18 MG/DL (ref 6–23)
CALCIUM SERPL-MCNC: 8.7 MG/DL (ref 8.6–10.6)
CHLORIDE SERPL-SCNC: 101 MMOL/L (ref 98–107)
CO2 SERPL-SCNC: 27 MMOL/L (ref 21–32)
CREAT SERPL-MCNC: 0.67 MG/DL (ref 0.5–1.3)
EGFRCR SERPLBLD CKD-EPI 2021: >90 ML/MIN/1.73M*2
ERYTHROCYTE [DISTWIDTH] IN BLOOD BY AUTOMATED COUNT: 15.1 % (ref 11.5–14.5)
GLUCOSE SERPL-MCNC: 81 MG/DL (ref 74–99)
HCT VFR BLD AUTO: 33.9 % (ref 41–52)
HGB BLD-MCNC: 11.4 G/DL (ref 13.5–17.5)
MCH RBC QN AUTO: 30.6 PG (ref 26–34)
MCHC RBC AUTO-ENTMCNC: 33.6 G/DL (ref 32–36)
MCV RBC AUTO: 91 FL (ref 80–100)
NRBC BLD-RTO: 0 /100 WBCS (ref 0–0)
PLATELET # BLD AUTO: 209 X10*3/UL (ref 150–450)
POTASSIUM SERPL-SCNC: 4.1 MMOL/L (ref 3.5–5.3)
RBC # BLD AUTO: 3.72 X10*6/UL (ref 4.5–5.9)
SODIUM SERPL-SCNC: 137 MMOL/L (ref 136–145)
WBC # BLD AUTO: 7.4 X10*3/UL (ref 4.4–11.3)

## 2024-06-28 PROCEDURE — 2500000002 HC RX 250 W HCPCS SELF ADMINISTERED DRUGS (ALT 637 FOR MEDICARE OP, ALT 636 FOR OP/ED): Performed by: PHYSICAL THERAPIST

## 2024-06-28 PROCEDURE — 2500000004 HC RX 250 GENERAL PHARMACY W/ HCPCS (ALT 636 FOR OP/ED): Performed by: PHYSICAL THERAPIST

## 2024-06-28 PROCEDURE — 97530 THERAPEUTIC ACTIVITIES: CPT | Mod: GP,CQ

## 2024-06-28 PROCEDURE — 85027 COMPLETE CBC AUTOMATED: CPT | Performed by: PHYSICAL THERAPIST

## 2024-06-28 PROCEDURE — 36415 COLL VENOUS BLD VENIPUNCTURE: CPT | Performed by: PHYSICAL THERAPIST

## 2024-06-28 PROCEDURE — 82374 ASSAY BLOOD CARBON DIOXIDE: CPT | Performed by: PHYSICAL THERAPIST

## 2024-06-28 PROCEDURE — 2500000001 HC RX 250 WO HCPCS SELF ADMINISTERED DRUGS (ALT 637 FOR MEDICARE OP): Performed by: PHYSICAL THERAPIST

## 2024-06-28 PROCEDURE — 82565 ASSAY OF CREATININE: CPT | Performed by: PHYSICAL THERAPIST

## 2024-06-28 PROCEDURE — 97116 GAIT TRAINING THERAPY: CPT | Mod: GP,CQ

## 2024-06-28 ASSESSMENT — PAIN SCALES - GENERAL
PAINLEVEL_OUTOF10: 7
PAINLEVEL_OUTOF10: 6
PAINLEVEL_OUTOF10: 5 - MODERATE PAIN
PAINLEVEL_OUTOF10: 7
PAINLEVEL_OUTOF10: 6
PAINLEVEL_OUTOF10: 7

## 2024-06-28 ASSESSMENT — PAIN SCALES - PAIN ASSESSMENT IN ADVANCED DEMENTIA (PAINAD): TOTALSCORE: MEDICATION (SEE MAR)

## 2024-06-28 ASSESSMENT — COGNITIVE AND FUNCTIONAL STATUS - GENERAL
TURNING FROM BACK TO SIDE WHILE IN FLAT BAD: A LITTLE
DAILY ACTIVITIY SCORE: 20
TURNING FROM BACK TO SIDE WHILE IN FLAT BAD: A LITTLE
WALKING IN HOSPITAL ROOM: A LITTLE
HELP NEEDED FOR BATHING: A LITTLE
CLIMB 3 TO 5 STEPS WITH RAILING: A LITTLE
CLIMB 3 TO 5 STEPS WITH RAILING: A LITTLE
MOBILITY SCORE: 18
WALKING IN HOSPITAL ROOM: A LITTLE
DRESSING REGULAR LOWER BODY CLOTHING: A LITTLE
MOVING FROM LYING ON BACK TO SITTING ON SIDE OF FLAT BED WITH BEDRAILS: A LITTLE
MOBILITY SCORE: 19
TOILETING: A LITTLE
MOVING FROM LYING ON BACK TO SITTING ON SIDE OF FLAT BED WITH BEDRAILS: A LITTLE
DRESSING REGULAR UPPER BODY CLOTHING: A LITTLE
MOVING TO AND FROM BED TO CHAIR: A LITTLE
MOVING TO AND FROM BED TO CHAIR: A LITTLE
STANDING UP FROM CHAIR USING ARMS: A LITTLE

## 2024-06-28 ASSESSMENT — PAIN - FUNCTIONAL ASSESSMENT
PAIN_FUNCTIONAL_ASSESSMENT: 0-10

## 2024-06-28 ASSESSMENT — PAIN DESCRIPTION - LOCATION: LOCATION: BACK

## 2024-06-28 ASSESSMENT — PAIN DESCRIPTION - ORIENTATION: ORIENTATION: POSTERIOR

## 2024-06-28 NOTE — HH CARE COORDINATION
Home Care received a Referral for Physical Therapy. We have processed the referral for a Start of Care on 6/30/24.     If you have any questions or concerns, please feel free to contact us at [phone]. Follow the prompts, enter your five digit zip code, and you will be directed to your care team on [facility].

## 2024-06-28 NOTE — NURSING NOTE
Patient discharged home, with home care iv removed discharge instructions explained and given will medicate at 1430 pt meds to bed delivered to room, Pt verbalized understanding of discharge plans and follow up appt  Pratibha Zavaleta RN

## 2024-06-28 NOTE — PROGRESS NOTES
"Alfa Cast is a 64 y.o. male on day 2 of admission presenting with Lumbar radiculopathy, chronic.    Subjective   Patient examined at bedside. Able to walk up and down the halls without a walker. Pain is controlled.        Objective     Physical Exam      C5: SILT   Deltoid 5/5 Left; 5/5 Right  C6: SILT   Wrist Ext: 5/5 Left; 5/5 Right  C7: SILT   Triceps: 5/5 Left; 5/5 Right  C8: SILT   Finger flexion: 5/5 Left; 5/5 Right  T1: SILT    Interossei: 5/5 Left; 5/5 Right        L1: SILT       L2: SILT      Hip flexors 5/5 Left; 5/5 Right  L3: SILT      Knee extension 5/5 Left; 5/5 Right  L4: SILT      Tib Ant. (Dorsiflexion) 5/5 Left; 5/5 Right  L5: SILT      EHL5/5 Left; 5/5 Right  S1: SILT      Planter flexion 5/5 Left; 5/5 Right    Drain intact with bloody output   Dressing is c/d/I           Last Recorded Vitals  Blood pressure 123/62, pulse 79, temperature 36.8 °C (98.2 °F), temperature source Temporal, resp. rate 20, height 1.778 m (5' 10\"), weight 93 kg (205 lb), SpO2 93%.  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 Gonzalez on 6/25    Plan:  Ortho primary  WBAT of the UE and LE  No extreme motions of the lumbar spine  DVT ppx:  SCDs and ambulation  Oral pain medication   Serrato removal on 6/26 and patient voiding  Drain x 1.  Record Q8 output, keep drain on self suction   home medications  Regular diet  PT/OT consult and rec HHC   Standing upright XR L Spine completed on 6/26.     Discussed with Dr. Carlos Thayer, PGY3    Spine team  Beverly" Nikki, PGY2  Jass Thayer, PGY3    I saw and evaluated the patient.  I personally obtained the key and critical portions of the history and physical exam or was physically present for key and critical portions performed by the Resident. I reviewed the documentation and discussed the patient with the Resident.  I agree with the Resident’s medical decision making as documented in the note.    Discontinue drain today  Ok to discharge home today  Follow up in 3 weeks as scheduled

## 2024-06-28 NOTE — CARE PLAN
Drain removed on 6/28/24 with no complications. Patient is stable for discharge today on 6/28/24    Discussed with Dr. Carlos Thayer, DO  Orthopedic Surgery, PGY3    Spine team  Beverly Jordan, PGY2  Jass Thayer, PGY3

## 2024-06-28 NOTE — PROGRESS NOTES
Patient is medically cleared for discharge today via private transportation with all personal belongings. Patient is processed for SOC with [facility] services [facility] in 24 to 48 hours. I will follow until discharged.

## 2024-06-28 NOTE — PROGRESS NOTES
Physical Therapy    Physical Therapy Treatment    Patient Name: Alfa aCst  MRN: [id number]  Today's Date: 6/28/2024  Time Calculation  Start Time: 1113  Stop Time: 1138  Time Calculation (min): 25 min    Assessment/Plan   PT Assessment  End of Session Communication: Bedside nurse  Assessment Comment: .  End of Session Patient Position: Up in chair, Alarm off, not on at start of session     PT Plan  Treatment/Interventions: Bed mobility, Transfer training, Gait training, Stair training, Balance training, Strengthening, Therapeutic exercise, Therapeutic activity, Home exercise program  PT Plan: Ongoing PT  PT Frequency: Daily  PT Discharge Recommendations: Low intensity level of continued care  Equipment Recommended upon Discharge:  (tbd)  PT Recommended Transfer Status: Stand by assist, Assistive device  PT - OK to Discharge: Yes (eval complete and discharge recommendations made)      General Visit Information:   PT  Visit  PT Received On: 06/28/24  General  Prior to Session Communication: Bedside nurse  Patient Position Received: Alarm off, not on at start of session  General Comment: Pt standing in hallway upon entering.continues to ambulate without device and no LOB. pt demonstrates increased understanding of bed mobility    Subjective   Precautions:  Precautions  Medical Precautions: Fall precautions  Post-Surgical Precautions: Spinal precautions  Vital Signs:       Objective   Pain:  Pain Assessment  Pain Assessment: 0-10  0-10 (Numeric) Pain Score: 5 - Moderate pain  Cognition:  Cognition  Orientation Level: Oriented X4    Treatments:       Bed Mobility 1  Bed Mobility 1: Supine to sitting, Sitting to supine  Level of Assistance 1: Close supervision  Bed Mobility Comments 1: HOB elevated, cues for log roll    Ambulation/Gait Training 1  Surface 1: Level tile  Device 1: No device  Assistance 1: Close supervision  Quality of Gait 1: Wide base of support  Comments/Distance (ft) 1: 300, cues to inhbit excessive  lateral sway  Transfer 1  Technique 1: Sit to stand, Stand to sit  Transfer Device 1: Walker  Transfer Level of Assistance 1: Modified independent    Stairs  Rails 1: Right  Curb Step 1: No  Device 1: Railing  Assistance 1: Close supervision  Comment/Number of Steps 1: 12, cues for posture    Outcome Measures:  [facility] Basic Mobility  Turning from your back to your side while in a flat bed without using bedrails: A little  Moving from lying on your back to sitting on the side of a flat bed without using bedrails: A little  Moving to and from bed to chair (including a wheelchair): A little  Standing up from a chair using your arms (e.g. wheelchair or bedside chair): None  To walk in hospital room: A little  Climbing 3-5 steps with railing: A little  Basic Mobility - Total Score: 19    Education Documentation  Body Mechanics, taught by Keith Quiroga PTA at 6/28/2024 12:00 PM.  Learner: Patient  Readiness: Acceptance  Method: Explanation  Response: Verbalizes Understanding    Education Comments  No comments found.        OP EDUCATION:       Encounter Problems       Encounter Problems (Active)       Balance       Pt will score >/=24 on the Tinetti to indicate low fall risk  (Progressing)       Start:  06/25/24    Expected End:  07/09/24               Mobility       Pt will amb >500ft with LRAD and supervision in prep for safe discharge home  (Progressing)       Start:  06/25/24    Expected End:  07/09/24            Pt will complete bed mobility independently  (Progressing)       Start:  06/25/24    Expected End:  07/09/24            Pt will a/descend 12 steps with B rail with supervision in prep for safe home mobility  (Progressing)       Start:  06/25/24    Expected End:  07/09/24               PT Transfers       Pt will transfer sit to stand with LRAD and supervision in prep for out of bed mobility  (Progressing)       Start:  06/25/24    Expected End:  07/09/24

## 2024-06-28 NOTE — CARE PLAN
Problem: Pain  Goal: Takes deep breaths with improved pain control throughout the shift  Outcome: Progressing  Goal: Turns in bed with improved pain control throughout the shift  Outcome: Progressing  Goal: Walks with improved pain control throughout the shift  Outcome: Progressing  Goal: Performs ADL's with improved pain control throughout shift  Outcome: Progressing  Goal: Participates in PT with improved pain control throughout the shift  Outcome: Progressing  Goal: Free from opioid side effects throughout the shift  Outcome: Progressing  Goal: Free from acute confusion related to pain meds throughout the shift  Outcome: Progressing     Problem: Fall/Injury  Goal: Not fall by end of shift  Outcome: Progressing  Goal: Be free from injury by end of the shift  Outcome: Progressing  Goal: Verbalize understanding of personal risk factors for fall in the hospital  Outcome: Progressing  Goal: Verbalize understanding of risk factor reduction measures to prevent injury from fall in the home  Outcome: Progressing  Goal: Use assistive devices by end of the shift  Outcome: Progressing  Goal: Pace activities to prevent fatigue by end of the shift  Outcome: Progressing   The patient's goals for the shift include      The clinical goals for the shift include pain management

## 2024-06-29 ENCOUNTER — HOME CARE VISIT (OUTPATIENT)
Dept: HOME HEALTH SERVICES | Facility: HOME HEALTH | Age: 64
End: 2024-06-29
Payer: MEDICARE

## 2024-06-29 VITALS — DIASTOLIC BLOOD PRESSURE: 60 MMHG | RESPIRATION RATE: 18 BRPM | SYSTOLIC BLOOD PRESSURE: 144 MMHG | HEART RATE: 80 BPM

## 2024-06-29 PROCEDURE — 169592 NO-PAY CLAIM PROCEDURE

## 2024-06-29 PROCEDURE — G0151 HHCP-SERV OF PT,EA 15 MIN: HCPCS | Mod: HHH

## 2024-06-29 ASSESSMENT — ACTIVITIES OF DAILY LIVING (ADL)
ENTERING_EXITING_HOME: NEEDS ASSISTANCE
AMBULATION_DISTANCE/DURATION_TOLERATED: 60 FT
AMBULATION ASSISTANCE ON FLAT SURFACES: 1
OASIS_M1830: 04

## 2024-06-29 ASSESSMENT — ENCOUNTER SYMPTOMS
PAIN LOCATION - PAIN DURATION: ACUTE
PAIN SEVERITY GOAL: 0/10
SUBJECTIVE PAIN PROGRESSION: WAXING AND WANING
LOWEST PAIN SEVERITY IN PAST 24 HOURS: 4/10
PAIN LOCATION - PAIN SEVERITY: 4/10
HIGHEST PAIN SEVERITY IN PAST 24 HOURS: 7/10
PAIN LOCATION - PAIN QUALITY: ACHING
PERSON REPORTING PAIN: PATIENT
PAIN LOCATION: BACK
PAIN LOCATION - EXACERBATING FACTORS: ACTIVITY
PAIN LOCATION - PAIN FREQUENCY: CONSTANT
PAIN: 1
PAIN LOCATION - RELIEVING FACTORS: MEDS

## 2024-06-29 NOTE — DISCHARGE SUMMARY
Discharge Diagnosis  Lumbar radiculopathy, chronic    Issues Requiring Follow-Up  Lumbar radiculopathy, chronic    Test Results Pending At Discharge  Pending Labs       No current pending labs.            Hospital Course   Alfa Cast is a 64 y.o. male who presented with Lumbar radiculopathy, chronic. Patient is now s/p L4-L5 laminectomy decompression and posterior spinal fusion with TLIF  on 6/25 by Dr. Gonzalez. On the day of surgery, patient was identified in the pre-operative holding area and agreeable to proceed with surgery. Written consent was obtained.  Please see operative note for further details of this procedure. Patient received 24 hours of camden-operative antibiotics. Patient recovered in the PACU before transfer to a regular nursing floor. Patient was started on oxycodone, tylenol, and dilaudid for pain control. Physical therapy recommended continued recovery at home with continued physical therapy and wound care. On the day of discharge, patient was afebrile with stable vital signs. Patient was neurovascularly intact at time of discharge. Patient will follow-up with Dr. Gonzalez in 3 weeks for post-operative visit.      Pertinent Physical Exam At Time of Discharge  Physical Exam  C5: SILT   Deltoid 5/5 Left; 5/5 Right  C6: SILT   Wrist Ext: 5/5 Left; 5/5 Right  C7: SILT   Triceps: 5/5 Left; 5/5 Right  C8: SILT   Finger flexion: 5/5 Left; 5/5 Right  T1: SILT    Interossei: 5/5 Left; 5/5 Right           L1: SILT       L2: SILT      Hip flexors 5/5 Left; 5/5 Right  L3: SILT      Knee extension 5/5 Left; 5/5 Right  L4: SILT      Tib Ant. (Dorsiflexion) 5/5 Left; 5/5 Right  L5: SILT      EHL5/5 Left; 5/5 Right  S1: SILT      Planter flexion 5/5 Left; 5/5 Right  Home Medications     Medication List      START taking these medications     acetaminophen 500 mg tablet; Commonly known as: Tylenol Extra Strength;   Take 2 tablets (1,000 mg) by mouth every 8 hours for 14 days.   oxyCODONE 5 mg immediate release tablet;  Commonly known as: Roxicodone;   Take 1 tablet (5 mg) by mouth every 4 hours if needed for severe pain (7 -   10) for up to 7 days.   polyethylene glycol 17 gram/dose powder; Commonly known as: Miralax;   Take 17 g by mouth 2 times a day as needed (constipation).     CHANGE how you take these medications     methocarbamol 500 mg tablet; Commonly known as: Robaxin; Take 1-2 tabs   every 8 hours as needed for spasms; What changed: how much to take, how to   take this, when to take this, reasons to take this, additional   instructions     CONTINUE taking these medications     albuterol 90 mcg/actuation inhaler; Commonly known as: Ventolin HFA;   Inhale 2 puffs every 4 hours if needed for wheezing or shortness of   breath.   aspirin 81 mg EC tablet   atorvastatin 80 mg tablet; Commonly known as: Lipitor; Take 1 tablet (80   mg) by mouth once daily.   multivitamin tablet   omeprazole 20 mg DR capsule; Commonly known as: PriLOSEC   tadalafil 5 mg tablet; Commonly known as: Cialis     STOP taking these medications     chlorhexidine 0.12 % solution; Commonly known as: Peridex       Outpatient Follow-Up  Future Appointments   Date Time Provider Department Center   6/29/2024 To Be Determined Digna Terrell, CHANDA [facility] [place]   7/17/2024  9:00 AM Honey Gonzalez MD [id number]   7/22/2024  1:45 PM Veda Vital DO [id number] [facility]       Shakira Jordan MD

## 2024-06-30 ENCOUNTER — HOME CARE VISIT (OUTPATIENT)
Dept: HOME HEALTH SERVICES | Facility: HOME HEALTH | Age: 64
End: 2024-06-30
Payer: MEDICARE

## 2024-07-02 ENCOUNTER — HOME CARE VISIT (OUTPATIENT)
Dept: HOME HEALTH SERVICES | Facility: HOME HEALTH | Age: 64
End: 2024-07-02
Payer: MEDICARE

## 2024-07-02 PROCEDURE — G0157 HHC PT ASSISTANT EA 15: HCPCS | Mod: CQ,HHH

## 2024-07-02 ASSESSMENT — ENCOUNTER SYMPTOMS
PAIN SEVERITY GOAL: 0/10
SUBJECTIVE PAIN PROGRESSION: WAXING AND WANING
PAIN LOCATION - PAIN SEVERITY: 2/10
PERSON REPORTING PAIN: PATIENT
PAIN LOCATION: BACK
PAIN: 1
LOWEST PAIN SEVERITY IN PAST 24 HOURS: 2/10

## 2024-07-02 ASSESSMENT — PAIN SCALES - PAIN ASSESSMENT IN ADVANCED DEMENTIA (PAINAD)
TOTALSCORE: 0
NEGVOCALIZATION: 0
BODYLANGUAGE: 0 - RELAXED.
NEGVOCALIZATION: 0 - NONE.
CONSOLABILITY: 0
BODYLANGUAGE: 0
BREATHING: 0
FACIALEXPRESSION: 0
FACIALEXPRESSION: 0 - SMILING OR INEXPRESSIVE.
CONSOLABILITY: 0 - NO NEED TO CONSOLE.

## 2024-07-09 ENCOUNTER — HOME CARE VISIT (OUTPATIENT)
Dept: HOME HEALTH SERVICES | Facility: HOME HEALTH | Age: 64
End: 2024-07-09
Payer: MEDICARE

## 2024-07-09 PROCEDURE — G0157 HHC PT ASSISTANT EA 15: HCPCS | Mod: CQ,HHH

## 2024-07-10 ASSESSMENT — ENCOUNTER SYMPTOMS
HIGHEST PAIN SEVERITY IN PAST 24 HOURS: 6/10
SUBJECTIVE PAIN PROGRESSION: GRADUALLY IMPROVING
PAIN LOCATION: BACK
PAIN SEVERITY GOAL: 0/10
PERSON REPORTING PAIN: PATIENT
PAIN LOCATION - PAIN SEVERITY: 5/10
PAIN: 1

## 2024-07-10 ASSESSMENT — PAIN SCALES - PAIN ASSESSMENT IN ADVANCED DEMENTIA (PAINAD)
NEGVOCALIZATION: 0
FACIALEXPRESSION: 0
CONSOLABILITY: 0 - NO NEED TO CONSOLE.
BREATHING: 0
FACIALEXPRESSION: 0 - SMILING OR INEXPRESSIVE.
NEGVOCALIZATION: 0 - NONE.
CONSOLABILITY: 0
BODYLANGUAGE: 0 - RELAXED.
TOTALSCORE: 0
BODYLANGUAGE: 0

## 2024-07-13 ASSESSMENT — ENCOUNTER SYMPTOMS: LOWEST PAIN SEVERITY IN PAST 24 HOURS: 3/10

## 2024-07-17 ENCOUNTER — HOSPITAL ENCOUNTER (OUTPATIENT)
Dept: RADIOLOGY | Facility: CLINIC | Age: 64
Discharge: HOME | End: 2024-07-17
Payer: MEDICARE

## 2024-07-17 ENCOUNTER — OFFICE VISIT (OUTPATIENT)
Dept: ORTHOPEDIC SURGERY | Facility: CLINIC | Age: 64
End: 2024-07-17
Payer: MEDICARE

## 2024-07-17 DIAGNOSIS — Z98.1 S/P LUMBAR FUSION: ICD-10-CM

## 2024-07-17 DIAGNOSIS — M54.16 LUMBAR RADICULOPATHY: ICD-10-CM

## 2024-07-17 DIAGNOSIS — M54.16 LUMBAR RADICULOPATHY: Primary | ICD-10-CM

## 2024-07-17 PROCEDURE — 1036F TOBACCO NON-USER: CPT | Performed by: ORTHOPAEDIC SURGERY

## 2024-07-17 PROCEDURE — 72100 X-RAY EXAM L-S SPINE 2/3 VWS: CPT

## 2024-07-17 PROCEDURE — 72100 X-RAY EXAM L-S SPINE 2/3 VWS: CPT | Performed by: RADIOLOGY

## 2024-07-17 PROCEDURE — 1159F MED LIST DOCD IN RCRD: CPT | Performed by: ORTHOPAEDIC SURGERY

## 2024-07-17 PROCEDURE — 99211 OFF/OP EST MAY X REQ PHY/QHP: CPT | Performed by: ORTHOPAEDIC SURGERY

## 2024-07-17 PROCEDURE — 1111F DSCHRG MED/CURRENT MED MERGE: CPT | Performed by: ORTHOPAEDIC SURGERY

## 2024-07-17 PROCEDURE — 1125F AMNT PAIN NOTED PAIN PRSNT: CPT | Performed by: ORTHOPAEDIC SURGERY

## 2024-07-17 ASSESSMENT — PAIN - FUNCTIONAL ASSESSMENT: PAIN_FUNCTIONAL_ASSESSMENT: 0-10

## 2024-07-17 ASSESSMENT — PAIN SCALES - GENERAL: PAINLEVEL_OUTOF10: 3

## 2024-07-17 NOTE — PROGRESS NOTES
S: Alfa Cast is a 64 y.o. year old male patient who is about 3 weeks out status post L4-L5 posterior spine instrumented fusion laminectomy decompression and TLIF on June 25, 2024 for left lumbar radiculopathy.  This is his first postop visit.  He is doing very well.  Radicular leg pain on the left that completely resolved.  He still has some soreness in his lower back around the incision likely more myofascial.  Seems to be relieved with muscle relaxant.  He takes the Robaxin as needed.  No bowel or bladder disturbances or saddle anesthesia.  No issues with his incision.  No drainage.  He did have 1 fall in the bathroom while taking a shower but that did not seem to have exacerbated any pain.      O:     General: Patient appears well-nourished and well-developed in no acute distress, Alert and Oriented x3  Psych: Pleasant mood and affect  HEENT: Extraocular muscles intact, pupils equal and round. Sclerae anicteric   Cardio: extremities warm and well perfused  Resp: unlabored symmetric breathing  Skin: Posterior lumbar incision clean dry intact.  Sutures removed in clinic today.  No drainage no erythema no fluctuance.  Musculoskeletal/Neuro Exam: Normal gait.  Minimal tenderness to palpation along the lumbar midline and paraspinal regions.  Negative straight leg raise.       Lower extremity    Motor: Right leg with 5 out of 5 motor strength with hip flexion, knee extension, ankle dorsiflexion plantarflexion EHL against resistance  Left leg with 5 out of 5 motor strength with hip flexion, knee extension, ankle dorsiflexion plantarflexion EHL against resistance    Sensation to light touch intact along L2-S1 bilateraly              Imaging:      I reviewed x-rays of the lumbar spine obtained in clinic today.  AP lateral views.  Posterior instrumentation is intact.  Interbody cage intact with no signs of loosening or broken hardware.        A/P: Alfa Cast is a 64 y.o. year old male patient who is 3 weeks out status  post L4-L5 laminectomy decompression posterior spine instrumented fusion and TLIF on June 25, 2024 for left lumbar radiculopathy.  He is doing well.  Radicular leg pain resolved.  Neurologically intact.  X-rays look stable today incisions healing nicely sutures removed in clinic today.  Continue restrictions with no lifting pushing pulling more than 10 pounds for another 8 weeks.  He will follow-up in 4 weeks.    At the next visit he will need standing upright AP lateral flexion-extension views of the lumbar spine    After our discussion, the patient articulated understanding of the plan and felt that all questions had been answered satisfactorily. The patient was pleased with the visit and very appreciative for the care rendered.    **Please excuse any errors in grammar or translation related to this dictation. Voice recognition software was utilized to prepare this document. **                  Honey Gonzalez MD    Department of Orthopaedic Surgery  [facility]  [email or web address]

## 2024-07-18 ENCOUNTER — HOME CARE VISIT (OUTPATIENT)
Dept: HOME HEALTH SERVICES | Facility: HOME HEALTH | Age: 64
End: 2024-07-18
Payer: MEDICARE

## 2024-07-18 PROCEDURE — G0157 HHC PT ASSISTANT EA 15: HCPCS | Mod: CQ,HHH

## 2024-07-18 ASSESSMENT — PAIN SCALES - PAIN ASSESSMENT IN ADVANCED DEMENTIA (PAINAD)
FACIALEXPRESSION: 0 - SMILING OR INEXPRESSIVE.
BODYLANGUAGE: 0
BREATHING: 0
CONSOLABILITY: 0
CONSOLABILITY: 0 - NO NEED TO CONSOLE.
NEGVOCALIZATION: 0 - NONE.
NEGVOCALIZATION: 0
FACIALEXPRESSION: 0
BODYLANGUAGE: 0 - RELAXED.
TOTALSCORE: 0

## 2024-07-18 ASSESSMENT — ENCOUNTER SYMPTOMS
PERSON REPORTING PAIN: PATIENT
PAIN SEVERITY GOAL: 0/10
HIGHEST PAIN SEVERITY IN PAST 24 HOURS: 4/10
LOWEST PAIN SEVERITY IN PAST 24 HOURS: 1/10
PAIN LOCATION: BACK
PAIN: 1
SUBJECTIVE PAIN PROGRESSION: GRADUALLY IMPROVING
PAIN LOCATION - PAIN SEVERITY: 4/10

## 2024-07-22 ENCOUNTER — APPOINTMENT (OUTPATIENT)
Dept: PRIMARY CARE | Facility: CLINIC | Age: 64
End: 2024-07-22
Payer: MEDICARE

## 2024-07-25 ENCOUNTER — HOME CARE VISIT (OUTPATIENT)
Dept: HOME HEALTH SERVICES | Facility: HOME HEALTH | Age: 64
End: 2024-07-25
Payer: MEDICARE

## 2024-07-29 NOTE — PROGRESS NOTES
Subjective   Patient ID: Alfa Cast is a 64 y.o. male who presents for Medicare Annual Wellness Visit Subsequent (6 months; had spinal surgery about a month ago).    Prostate cancer: He is going to [facility] for pelvic floor PT. s/p robotic prostatectomy with Dr. Nel Hawthorne in March 2021. He has a followup with the surgeon's team at [facility] next month. Recently started on the cialis. He went to pelvic floor PT x2 for 4 or 5 months apiece and he only had slight temporary improvement. He states that his issue is that there is no sensation beforehand to warn him that he needs to urinate. Following with urology every 6 months.     Back pain: Had surgery last month. He is doing ok. He is about 6 weeks out. He was doing home PT but hasn't needed to continue with traditional PT. He is walking every day. Not currently taking anything for pain. He has a muscle relaxer for prn use.      GERD: Controlled on omeprazole.      Tinnitus: He has a history of tinnitus, not using hearing aids. [facility]      Dermatitis: He has been having skin dryness and splitting. He uses a working hands otx ointment. He is also having an unusual problem with bruising since he got his flut shot this year. He is experiencing some dehydration because of his bladder symptoms.     Had a low dose lung cancer screening CT done July 2023. There are multiple nodules 4mm that are considered to be benign, will check yearly.   It was also noted that he has some calcifications around coronary arteries. No recent stress test on file.      Hyperlipidemia: Clifton risk score 15.3% base on lipid panel   BP is elevated today      [facility] for [facility] during the       He has a special needs son, age 22, who came out here from [place] to get access to a school in the area.     All other systems have been reviewed and are negative for complaint     Objective   /86 (BP  "Location: Left arm, Patient Position: Sitting, BP Cuff Size: Adult)   Pulse 62   Ht 1.778 m (5' 10\")   Wt 94.8 kg (209 lb)   BMI 29.99 kg/m²     Gen: No acute distress, alert and oriented x3, pleasant   HEENT: moist mucous membranes, b/l external auditory canals are clear of debris, TMs within normal limits, no oropharyngeal lesions, eomi, perrla   Neck: thyroid within normal limits, no lymphadenopathy   CV: RRR, normal S1/S2, no murmur   Resp: Clear to auscultation bilaterally, no wheezes or rhonchi appreciated  Abd: soft, nontender, non-distended, no guarding/rigidity, bowel sounds present  Extr: no edema, no calf tenderness  Derm: Skin is warm and dry, no rashes appreciated  Psych: mood is good, affect is congruent, good hygiene, normal speech and eye contact  Neuro: cranial nerves grossly intact, normal gait    Assessment/Plan   #Prostate cancer  #Urinary incontinence  #ED  s/p radical prostatectomy  he has been to pelvic floor PT without much improvement  Monitoring PSA with [facility]      #Tinnitus  discussed supportive care  He is going to see ENT next month      #Back pain   S/p surgery, doing reasonably well     #Cigarette smoker  wellbutrin is helpful  Cigar once per day   Quit for a long time  Ppd >20 years   CT lung cancer screening   Lung nodules noted likely benign   Would recheck at his follow up in July 2024     #CAD  Calcifications noted on CT scan for lungs   Ordering stress test today   Discussed diet   Monitor lipids      #GERD  Controlled on otc omeprazole     HCM:  UTD for COVID, PPSV23, and flu shots  last C-scope was 2021, next due 2026  "

## 2024-07-31 ENCOUNTER — HOME CARE VISIT (OUTPATIENT)
Dept: HOME HEALTH SERVICES | Facility: HOME HEALTH | Age: 64
End: 2024-07-31
Payer: MEDICARE

## 2024-07-31 ASSESSMENT — ACTIVITIES OF DAILY LIVING (ADL)
OASIS_M1830: 00
HOME_HEALTH_OASIS: 00

## 2024-08-09 ENCOUNTER — APPOINTMENT (OUTPATIENT)
Dept: PRIMARY CARE | Facility: CLINIC | Age: 64
End: 2024-08-09
Payer: MEDICARE

## 2024-08-09 VITALS
HEART RATE: 62 BPM | DIASTOLIC BLOOD PRESSURE: 80 MMHG | SYSTOLIC BLOOD PRESSURE: 135 MMHG | HEIGHT: 70 IN | BODY MASS INDEX: 29.92 KG/M2 | WEIGHT: 209 LBS

## 2024-08-09 DIAGNOSIS — Z12.83 SCREENING FOR SKIN CANCER: ICD-10-CM

## 2024-08-09 DIAGNOSIS — R91.8 LUNG NODULES: Primary | ICD-10-CM

## 2024-08-09 DIAGNOSIS — E78.5 HYPERLIPIDEMIA, UNSPECIFIED HYPERLIPIDEMIA TYPE: ICD-10-CM

## 2024-08-09 PROCEDURE — 1036F TOBACCO NON-USER: CPT | Performed by: FAMILY MEDICINE

## 2024-08-09 PROCEDURE — G0439 PPPS, SUBSEQ VISIT: HCPCS | Performed by: FAMILY MEDICINE

## 2024-08-09 PROCEDURE — 1170F FXNL STATUS ASSESSED: CPT | Performed by: FAMILY MEDICINE

## 2024-08-09 PROCEDURE — 3008F BODY MASS INDEX DOCD: CPT | Performed by: FAMILY MEDICINE

## 2024-08-09 PROCEDURE — 1124F ACP DISCUSS-NO DSCNMKR DOCD: CPT | Performed by: FAMILY MEDICINE

## 2024-08-09 PROCEDURE — 1159F MED LIST DOCD IN RCRD: CPT | Performed by: FAMILY MEDICINE

## 2024-08-09 ASSESSMENT — PATIENT HEALTH QUESTIONNAIRE - PHQ9
10. IF YOU CHECKED OFF ANY PROBLEMS, HOW DIFFICULT HAVE THESE PROBLEMS MADE IT FOR YOU TO DO YOUR WORK, TAKE CARE OF THINGS AT HOME, OR GET ALONG WITH OTHER PEOPLE: NOT DIFFICULT AT ALL
2. FEELING DOWN, DEPRESSED OR HOPELESS: SEVERAL DAYS
1. LITTLE INTEREST OR PLEASURE IN DOING THINGS: SEVERAL DAYS
SUM OF ALL RESPONSES TO PHQ9 QUESTIONS 1 AND 2: 2

## 2024-08-09 ASSESSMENT — ACTIVITIES OF DAILY LIVING (ADL)
TAKING_MEDICATION: INDEPENDENT
DOING_HOUSEWORK: INDEPENDENT
MANAGING_FINANCES: INDEPENDENT
GROCERY_SHOPPING: INDEPENDENT
DRESSING: INDEPENDENT
BATHING: INDEPENDENT

## 2024-08-14 ENCOUNTER — HOSPITAL ENCOUNTER (OUTPATIENT)
Dept: RADIOLOGY | Facility: CLINIC | Age: 64
Discharge: HOME | End: 2024-08-14
Payer: MEDICARE

## 2024-08-14 ENCOUNTER — OFFICE VISIT (OUTPATIENT)
Dept: ORTHOPEDIC SURGERY | Facility: CLINIC | Age: 64
End: 2024-08-14
Payer: MEDICARE

## 2024-08-14 DIAGNOSIS — Z98.1 S/P LUMBAR SPINAL FUSION: ICD-10-CM

## 2024-08-14 DIAGNOSIS — M54.16 LUMBAR RADICULOPATHY: Primary | ICD-10-CM

## 2024-08-14 DIAGNOSIS — M54.16 LUMBAR RADICULOPATHY: ICD-10-CM

## 2024-08-14 PROCEDURE — 72120 X-RAY BEND ONLY L-S SPINE: CPT | Performed by: RADIOLOGY

## 2024-08-14 PROCEDURE — 72120 X-RAY BEND ONLY L-S SPINE: CPT

## 2024-08-14 PROCEDURE — 1159F MED LIST DOCD IN RCRD: CPT | Performed by: ORTHOPAEDIC SURGERY

## 2024-08-14 PROCEDURE — 1036F TOBACCO NON-USER: CPT | Performed by: ORTHOPAEDIC SURGERY

## 2024-08-14 PROCEDURE — 99211 OFF/OP EST MAY X REQ PHY/QHP: CPT | Performed by: ORTHOPAEDIC SURGERY

## 2024-08-14 NOTE — PROGRESS NOTES
S: Alfa Cast is a 64 y.o. year old male patient who is about 7 weeks out status post L4-L5 posterior spine instrumented fusion laminectomy decompression and TLIF on June 25, 2024 for left lumbar radiculopathy.  He continues to be doing very well.  Radicular leg pain on the left that completely resolved. No back pain.  He is eager to get back to his activities.       O:     General: Patient appears well-nourished and well-developed in no acute distress, Alert and Oriented x3  Psych: Pleasant mood and affect  HEENT: Extraocular muscles intact, pupils equal and round. Sclerae anicteric   Cardio: extremities warm and well perfused  Resp: unlabored symmetric breathing  Skin: Posterior lumbar incision well healed  Musculoskeletal/Neuro Exam: Normal gait.   Negative straight leg raise.       Lower extremity    Motor: Right leg with 5 out of 5 motor strength with hip flexion, knee extension, ankle dorsiflexion plantarflexion EHL against resistance  Left leg with 5 out of 5 motor strength with hip flexion, knee extension, ankle dorsiflexion plantarflexion EHL against resistance    Sensation to light touch intact along L2-S1 bilateraly              Imaging:      I reviewed x-rays of the lumbar spine obtained in clinic today.  AP lateral flexion and extension views.  Posterior instrumentation is intact.  Interbody cage intact with no signs of loosening or broken hardware. No dynamic instability        A/P: Alfa Cast is a 64 y.o. year old male patient who is 7 weeks out status post L4-L5 laminectomy decompression posterior spine instrumented fusion and TLIF on June 25, 2024 for left lumbar radiculopathy.  Continues to do well.  Radicular leg pain resolved.    X-rays look stable today. Incision is well healed. I would like  him to start physical therapy for core, lower back, and lower extremity stretching and strengthening. Referral given to him in clinic today. He can start to gradually increase his activity. Follow up in 4  weeks.     At the next visit he will need standing upright AP lateral flexion-extension views of the lumbar spine    After our discussion, the patient articulated understanding of the plan and felt that all questions had been answered satisfactorily. The patient was pleased with the visit and very appreciative for the care rendered.    **Please excuse any errors in grammar or translation related to this dictation. Voice recognition software was utilized to prepare this document. **                  Honey Gonzalez MD    Department of Orthopaedic Surgery  [facility]  [email or web address]

## 2024-08-20 ENCOUNTER — LAB (OUTPATIENT)
Dept: LAB | Facility: LAB | Age: 64
End: 2024-08-20
Payer: MEDICARE

## 2024-08-20 DIAGNOSIS — E78.5 HYPERLIPIDEMIA, UNSPECIFIED HYPERLIPIDEMIA TYPE: ICD-10-CM

## 2024-08-20 LAB
ALBUMIN SERPL BCP-MCNC: 4.3 G/DL (ref 3.4–5)
ALP SERPL-CCNC: 114 U/L (ref 33–136)
ALT SERPL W P-5'-P-CCNC: 24 U/L (ref 10–52)
ANION GAP SERPL CALC-SCNC: 12 MMOL/L (ref 10–20)
AST SERPL W P-5'-P-CCNC: 28 U/L (ref 9–39)
BASOPHILS # BLD AUTO: 0.12 X10*3/UL (ref 0–0.1)
BASOPHILS NFR BLD AUTO: 2.1 %
BILIRUB SERPL-MCNC: 0.8 MG/DL (ref 0–1.2)
BUN SERPL-MCNC: 14 MG/DL (ref 6–23)
CALCIUM SERPL-MCNC: 8.6 MG/DL (ref 8.6–10.3)
CHLORIDE SERPL-SCNC: 103 MMOL/L (ref 98–107)
CHOLEST SERPL-MCNC: 141 MG/DL (ref 0–199)
CHOLESTEROL/HDL RATIO: 1.8
CO2 SERPL-SCNC: 27 MMOL/L (ref 21–32)
CREAT SERPL-MCNC: 0.68 MG/DL (ref 0.5–1.3)
EGFRCR SERPLBLD CKD-EPI 2021: >90 ML/MIN/1.73M*2
EOSINOPHIL # BLD AUTO: 0.42 X10*3/UL (ref 0–0.7)
EOSINOPHIL NFR BLD AUTO: 7.3 %
ERYTHROCYTE [DISTWIDTH] IN BLOOD BY AUTOMATED COUNT: 14.2 % (ref 11.5–14.5)
GLUCOSE SERPL-MCNC: 86 MG/DL (ref 74–99)
HCT VFR BLD AUTO: 34.7 % (ref 41–52)
HDLC SERPL-MCNC: 78.7 MG/DL
HGB BLD-MCNC: 11.3 G/DL (ref 13.5–17.5)
IMM GRANULOCYTES # BLD AUTO: 0.01 X10*3/UL (ref 0–0.7)
IMM GRANULOCYTES NFR BLD AUTO: 0.2 % (ref 0–0.9)
LDLC SERPL CALC-MCNC: 46 MG/DL
LYMPHOCYTES # BLD AUTO: 1.23 X10*3/UL (ref 1.2–4.8)
LYMPHOCYTES NFR BLD AUTO: 21.3 %
MCH RBC QN AUTO: 29.4 PG (ref 26–34)
MCHC RBC AUTO-ENTMCNC: 32.6 G/DL (ref 32–36)
MCV RBC AUTO: 90 FL (ref 80–100)
MONOCYTES # BLD AUTO: 0.64 X10*3/UL (ref 0.1–1)
MONOCYTES NFR BLD AUTO: 11.1 %
NEUTROPHILS # BLD AUTO: 3.35 X10*3/UL (ref 1.2–7.7)
NEUTROPHILS NFR BLD AUTO: 58 %
NON HDL CHOLESTEROL: 62 MG/DL (ref 0–149)
NRBC BLD-RTO: 0 /100 WBCS (ref 0–0)
PLATELET # BLD AUTO: 219 X10*3/UL (ref 150–450)
POTASSIUM SERPL-SCNC: 3.7 MMOL/L (ref 3.5–5.3)
PROT SERPL-MCNC: 6.9 G/DL (ref 6.4–8.2)
RBC # BLD AUTO: 3.84 X10*6/UL (ref 4.5–5.9)
SODIUM SERPL-SCNC: 138 MMOL/L (ref 136–145)
TRIGL SERPL-MCNC: 82 MG/DL (ref 0–149)
VLDL: 16 MG/DL (ref 0–40)
WBC # BLD AUTO: 5.8 X10*3/UL (ref 4.4–11.3)

## 2024-08-20 PROCEDURE — 85025 COMPLETE CBC W/AUTO DIFF WBC: CPT

## 2024-08-20 PROCEDURE — 36415 COLL VENOUS BLD VENIPUNCTURE: CPT

## 2024-08-20 PROCEDURE — 80061 LIPID PANEL: CPT

## 2024-08-20 PROCEDURE — 80053 COMPREHEN METABOLIC PANEL: CPT

## 2024-08-26 ENCOUNTER — HOSPITAL ENCOUNTER (OUTPATIENT)
Dept: RADIOLOGY | Facility: HOSPITAL | Age: 64
Discharge: HOME | End: 2024-08-26
Payer: MEDICARE

## 2024-08-26 DIAGNOSIS — R91.8 LUNG NODULES: ICD-10-CM

## 2024-08-26 DIAGNOSIS — F17.210 NICOTINE DEPENDENCE, CIGARETTES, UNCOMPLICATED: ICD-10-CM

## 2024-08-26 PROCEDURE — 71271 CT THORAX LUNG CANCER SCR C-: CPT | Performed by: RADIOLOGY

## 2024-08-26 PROCEDURE — 71271 CT THORAX LUNG CANCER SCR C-: CPT

## 2024-08-28 ENCOUNTER — EVALUATION (OUTPATIENT)
Dept: PHYSICAL THERAPY | Facility: HOSPITAL | Age: 64
End: 2024-08-28
Payer: MEDICARE

## 2024-08-28 DIAGNOSIS — Z98.890 STATUS POST LUMBAR SURGERY: Primary | ICD-10-CM

## 2024-08-28 DIAGNOSIS — M54.16 LUMBAR RADICULOPATHY: ICD-10-CM

## 2024-08-28 DIAGNOSIS — Z98.1 S/P LUMBAR SPINAL FUSION: ICD-10-CM

## 2024-08-28 PROCEDURE — 97161 PT EVAL LOW COMPLEX 20 MIN: CPT | Mod: GP

## 2024-08-28 ASSESSMENT — ENCOUNTER SYMPTOMS
LOSS OF SENSATION IN FEET: 0
DEPRESSION: 0
OCCASIONAL FEELINGS OF UNSTEADINESS: 0

## 2024-08-28 ASSESSMENT — COLUMBIA-SUICIDE SEVERITY RATING SCALE - C-SSRS
2. HAVE YOU ACTUALLY HAD ANY THOUGHTS OF KILLING YOURSELF?: NO
6. HAVE YOU EVER DONE ANYTHING, STARTED TO DO ANYTHING, OR PREPARED TO DO ANYTHING TO END YOUR LIFE?: NO
1. IN THE PAST MONTH, HAVE YOU WISHED YOU WERE DEAD OR WISHED YOU COULD GO TO SLEEP AND NOT WAKE UP?: NO

## 2024-08-28 ASSESSMENT — PAIN - FUNCTIONAL ASSESSMENT: PAIN_FUNCTIONAL_ASSESSMENT: 0-10

## 2024-08-28 ASSESSMENT — PAIN SCALES - GENERAL: PAINLEVEL_OUTOF10: 0 - NO PAIN

## 2024-08-28 NOTE — PROGRESS NOTES
Physical Therapy    Physical Therapy Evaluation    Patient Name: Alfa Cast  MRN: [id number]  Today's Date: 8/28/2024    Time Entry:  Time Calculation  Start Time: 0915  Stop Time: 0936  Time Calculation (min): 21 min  PT Evaluation Time Entry  PT Evaluation (Low) Time Entry: 21     Assessment  PT Assessment Results: Decreased strength, Orthopedic restrictions  Rehab Prognosis: Good  Assessment Comment: Patient non painful s/p lumbar fusion. He will benefit from targeted core program to support further recovery.    Plan  Treatment/Interventions: Education/ Instruction, Therapeutic activities, Therapeutic exercises  PT Plan: Skilled PT  PT Frequency: 1 time per week  Duration: 4 wks as needed.  Certification Period Start Date: 08/28/24  Certification Period End Date: 09/27/24  Rehab Potential: Good  Plan of Care Agreement: Patient    Current Problem  1. Status post lumbar surgery        2. Lumbar radiculopathy  Referral to Physical Therapy      3. S/P lumbar spinal fusion  Referral to Physical Therapy          Subjective   General:  General  Reason for Referral: Eval and treat s/p lumbar fusion (Patient arrives late to session without paperwork completed.)  Referred By: Dr. Gonzalez  Past Medical History Relevant to Rehab: Patient underwent successful laminectomy and fusion in June. He is now referred for coree program (patient familiar to us from lengthy pre operative course).    Precautions:  Precautions  STEADI Fall Risk Score (The score of 4 or more indicates an increased risk of falling): 0  Post-Surgical Precautions: Spinal precautions     Pain:  Pain Assessment: 0-10  0-10 (Numeric) Pain Score: 0 - No pain    Objective   Posture:  Posture Comment: Reduced lumbar lordosis    Range of Motion:  Range of Motion Comments: End range lumbar motions not stressed. LE AROM WNL    Strength:  Strength Comments: No focal LE weakness noted.    Flexibility:  WNL     Palpation:  NTTP      Gait:  Gait Comment: No devices, Non  antalgic gait.    Balance:  No deficit     Bed Mobility:  I     Transfers:  I     Lumbar Spine    Functional Rating Scale     Observation  No abnormality     Lumbar Palpation/Joint Mobility Assessment  Fusion     Lumbar AROM  Severe limitation in all planes 2/2 surgery/fusion     Hip AROM  WNL     Lumbar Myotomes  No focal weakness     Specific Lower Extremity MMT  No focal weakness     Dermatomes  No sensory level     Outcome Measures:  Oswestry 12/24%      OP EDUCATION:  Outpatient Education  Individual(s) Educated: Patient  Education Provided: Anatomy, Body Mechanics, Home Exercise Program  Patient Response to Education: Patient/Caregiver Verbalized Understanding of Information     Screening  Frequency  Date Last Completed   Spiritual and Cultural Beliefs   Screening  each visit or episode of care 8/28/2024   Falls Risk Screening  every ambulatory visit [unfilled]   Pain Screening  annually at primary care visit  5/22/2024   Domestic Violence screening  annually at primary care visit 5/22/2024   Elder Abuse Screening  annually at primary care visit 5/22/2024   Depression Screening  annually in the primary care setting 8/28/2024   Suicide Risk Screening  annually in the primary care setting 9/8/2024   Nutrition and Food Insecurity   Screening  at least annually at primary care visit  6/25/2024   Key Learner  annually in the primary care setting 8/28/2024   Drug Screen  9/8/2024  2:13 AM   Alcohol Screen  9/8/2024  2:13 AM   Advance Directive  5/22/2024        Goals:  Active       PT Problem       The patient will demonstrate increased core strength as evidenced by double leg lowering to 45  degrees with good lumbar stability  to improve core support in functional mobility tasks.        Start:  08/28/24    Expected End:  09/27/24            The patient will demonstrate full understanding and competent performance of their home exercise program to maintain or potentially further improve their presenting  condition.        Start:  08/28/24    Expected End:  09/27/24            The patient will record a decrease in self assessed disability on the Oswestry index of 10 ponts as evidence of meaningful decrease in the impact of pain on their ADL activities.        Start:  08/28/24    Expected End:  09/27/24

## 2024-08-28 NOTE — LETTER
August 28, 2024    Anjum Yo, PT  [address]  Rehab Services  [facility] [address]    Patient: Alfa Cast   YOB: 1960   Date of Visit: 8/28/2024       Dear Honey Gonzalez MD  [address]  [place] [address]    The attached plan of care is being sent to you because your patient’s medical reimbursement requires that you certify the plan of care. Your signature is required to allow uninterrupted insurance coverage.      You may indicate your approval by signing below and faxing this form back to us at Dept Fax: [phone].    Please call Dept: [phone] with any questions or concerns.    Thank you for this referral,        Anjum Yo PT  [facility]  [address] [facility] [address] [phone]    Payer: Payor: MEDICARE / Plan: MEDICARE PART A AND B / Product Type: *No Product type* /                                                                         Date:     Dear Anjum Yo PT,     Re: Mr. Alfa Cast, MRN:[id number]    I certify that I have reviewed the attached plan of care and it is medically necessary for Mr. Alfa Cast (1960) who is under my care.          ______________________________________                    _________________  Provider name and credentials                                           Date and time                                                                                           Plan of Care 8/28/24   Effective from: 8/28/2024  Effective to: 9/27/2024    Plan ID: [id number]            Participants as of Finalize on 8/28/2024    Name Type Comments Contact Info    Honey Gonzalez MD Referring Provider  [phone]    Anjum Yo PT Physical Therapist  [phone]       Last Plan Note     Author: Anjum Yo PT Status: Incomplete Last edited: 8/28/2024  9:00 AM       Physical Therapy    Physical Therapy Evaluation    Patient Name: Alfa  Segun  MRN: [id number]  Today's Date: 8/28/2024    Time Entry:  Time Calculation  Start Time: 0915  Stop Time: 0936  Time Calculation (min): 21 min  PT Evaluation Time Entry  PT Evaluation (Low) Time Entry: 21     Assessment  PT Assessment Results: Decreased strength, Orthopedic restrictions  Rehab Prognosis: Good  Assessment Comment: Patient non painful s/p lumbar fusion. He will benefit from targeted core program to support further recovery.    Plan  Treatment/Interventions: Education/ Instruction, Therapeutic activities, Therapeutic exercises  PT Plan: Skilled PT  PT Frequency: 1 time per week  Duration: 4 wks as needed.  Certification Period Start Date: 08/28/24  Certification Period End Date: 09/27/24  Rehab Potential: Good  Plan of Care Agreement: Patient    Current Problem  1. Status post lumbar surgery        2. Lumbar radiculopathy  Referral to Physical Therapy      3. S/P lumbar spinal fusion  Referral to Physical Therapy          Subjective  General:  General  Reason for Referral: Eval and treat s/p lumbar fusion (Patient arrives late to session without paperwork completed.)  Referred By: Dr. Gonzalez  Past Medical History Relevant to Rehab: Patient underwent successful laminectomy and fusion in June. He is now referred for coree program (patient familiar to us from lengthy pre operative course).    Precautions:  Precautions  STEADI Fall Risk Score (The score of 4 or more indicates an increased risk of falling): 0  Post-Surgical Precautions: Spinal precautions     Pain:  Pain Assessment: 0-10  0-10 (Numeric) Pain Score: 0 - No pain    Objective  Posture:  Posture Comment: Reduced lumbar lordosis    Range of Motion:  Range of Motion Comments: End range lumbar motions not stressed. LE AROM WNL    Strength:  Strength Comments: No focal LE weakness noted.    Flexibility:  WNL     Palpation:  NTTP      Gait:  Gait Comment: No devices, Non antalgic gait.    Balance:  No deficit     Bed Mobility:  I      Transfers:  I     Lumbar Spine    Functional Rating Scale     Observation  No abnormality     Lumbar Palpation/Joint Mobility Assessment  Fusion     Lumbar AROM  Severe limitation in all planes 2/2 surgery/fusion     Hip AROM  WNL     Lumbar Myotomes  No focal weakness     Specific Lower Extremity MMT  No focal weakness     Dermatomes  No sensory level     Outcome Measures:  Oswestry 12/24%      OP EDUCATION:  Outpatient Education  Individual(s) Educated: Patient  Education Provided: Anatomy, Body Mechanics, Home Exercise Program  Patient Response to Education: Patient/Caregiver Verbalized Understanding of Information    Goals:  Active       PT Problem       The patient will demonstrate increased core strength as evidenced by double leg lowering to 45  degrees with good lumbar stability  to improve core support in functional mobility tasks.        Start:  08/28/24    Expected End:  09/27/24            The patient will demonstrate full understanding and competent performance of their home exercise program to maintain or potentially further improve their presenting condition.        Start:  08/28/24    Expected End:  09/27/24            The patient will record a decrease in self assessed disability on the Oswestry index of 10 ponts as evidence of meaningful decrease in the impact of pain on their ADL activities.        Start:  08/28/24    Expected End:  09/27/24                                Current Participants as of 8/28/2024    Name Type Comments Contact Info    Honey Gonzalez MD Referring Provider  [phone]    Signature pending    Anjum Yo PT Physical Therapist  [phone]    Signature pending

## 2024-09-04 ENCOUNTER — TREATMENT (OUTPATIENT)
Dept: PHYSICAL THERAPY | Facility: HOSPITAL | Age: 64
End: 2024-09-04
Payer: MEDICARE

## 2024-09-04 DIAGNOSIS — Z98.890 STATUS POST LUMBAR SURGERY: ICD-10-CM

## 2024-09-04 PROCEDURE — 97110 THERAPEUTIC EXERCISES: CPT | Mod: GP

## 2024-09-04 ASSESSMENT — PAIN SCALES - GENERAL: PAINLEVEL_OUTOF10: 0 - NO PAIN

## 2024-09-04 ASSESSMENT — PAIN - FUNCTIONAL ASSESSMENT: PAIN_FUNCTIONAL_ASSESSMENT: 0-10

## 2024-09-04 NOTE — PROGRESS NOTES
Physical Therapy    Physical Therapy Treatment    Patient Name: Alfa Cast  MRN: [id number]  Today's Date: 9/4/2024    Time Entry:   Time Calculation  Start Time: 0944  Stop Time: 1027  Time Calculation (min): 43 min     PT Therapeutic Procedures Time Entry  Therapeutic Exercise Time Entry: 43     Assessment:  PT Assessment  Assessment Comment: Tolerates core program well.  Plan:  OP PT Plan  PT Plan: Skilled PT (2/4)    Current Problem  1. Status post lumbar surgery  Follow Up In Physical Therapy          General  PT  Visit  PT Received On: 09/04/24  General  General Comment: No back pain today. A little hip pain from cleaning out gutters.    Subjective    Precautions  Precautions  Post-Surgical Precautions: Spinal precautions     Pain  Pain Assessment  Pain Assessment: 0-10  0-10 (Numeric) Pain Score: 0 - No pain (No LBP)    Objective   Activity Tolerance:  Activity Tolerance  Activity Tolerance Comments: Good, working at deliberate pace.    Treatments:  Therapeutic Exercise  Therapeutic Exercise Performed: Yes  Therapeutic Exercise Activity 1: Physiostep x 12 minutes.  Therapeutic Exercise Activity 2: [place] hip extension 3x10  Therapeutic Exercise Activity 3: [place] back extension 3x10  Therapeutic Exercise Activity 4: EOB hamstring stretch 3x30 sec  Therapeutic Exercise Activity 5: standing rectus stretch 3x30 sec  Therapeutic Exercise Activity 6: DB holds 3x30 sec  Therapeutic Exercise Activity 7: DB extensions 1x10    OP EDUCATION:  HEP given:    Access Code: [email or web address]  URL: [email or web address]  Date: 09/04/2024  Prepared by: Anjum Yo    Exercises  - Bird Dog  - 1 x daily - 7 x weekly - 3 sets - 10 reps  - Isometric Dead Bug  - 1 x daily - 7 x weekly - 3 sets - 1 reps - 30 sec hold  - Supine Dead Bug with Leg Extension  - 1 x daily - 7 x weekly - 3 sets - 10 reps    Goals:  Active       PT Problem       The patient will demonstrate increased core strength as evidenced by  double leg lowering to 45  degrees with good lumbar stability  to improve core support in functional mobility tasks.        Start:  08/28/24    Expected End:  09/27/24            The patient will demonstrate full understanding and competent performance of their home exercise program to maintain or potentially further improve their presenting condition.        Start:  08/28/24    Expected End:  09/27/24            The patient will record a decrease in self assessed disability on the Oswestry index of 10 ponts as evidence of meaningful decrease in the impact of pain on their ADL activities.        Start:  08/28/24    Expected End:  09/27/24

## 2024-09-08 ENCOUNTER — APPOINTMENT (OUTPATIENT)
Dept: RADIOLOGY | Facility: HOSPITAL | Age: 64
End: 2024-09-08
Payer: MEDICARE

## 2024-09-08 ENCOUNTER — HOSPITAL ENCOUNTER (EMERGENCY)
Facility: HOSPITAL | Age: 64
Discharge: HOME | End: 2024-09-08
Attending: EMERGENCY MEDICINE
Payer: MEDICARE

## 2024-09-08 VITALS
BODY MASS INDEX: 32.77 KG/M2 | OXYGEN SATURATION: 99 % | RESPIRATION RATE: 16 BRPM | DIASTOLIC BLOOD PRESSURE: 73 MMHG | TEMPERATURE: 97.3 F | SYSTOLIC BLOOD PRESSURE: 124 MMHG | WEIGHT: 208.78 LBS | HEIGHT: 67 IN | HEART RATE: 60 BPM

## 2024-09-08 DIAGNOSIS — S76.012A HIP STRAIN, LEFT, INITIAL ENCOUNTER: ICD-10-CM

## 2024-09-08 DIAGNOSIS — S09.90XA HEAD INJURY, INITIAL ENCOUNTER: ICD-10-CM

## 2024-09-08 DIAGNOSIS — S01.01XA SCALP LACERATION, INITIAL ENCOUNTER: ICD-10-CM

## 2024-09-08 DIAGNOSIS — S39.012A STRAIN OF LUMBAR REGION, INITIAL ENCOUNTER: ICD-10-CM
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
## 2024-09-08 PROCEDURE — 70450 CT HEAD/BRAIN W/O DYE: CPT | Performed by: SURGERY

## 2024-09-08 PROCEDURE — 73502 X-RAY EXAM HIP UNI 2-3 VIEWS: CPT | Mod: LEFT SIDE | Performed by: SURGERY

## 2024-09-08 PROCEDURE — 70450 CT HEAD/BRAIN W/O DYE: CPT

## 2024-09-08 PROCEDURE — 73502 X-RAY EXAM HIP UNI 2-3 VIEWS: CPT | Mod: LT

## 2024-09-08 PROCEDURE — 12002 RPR S/N/AX/GEN/TRNK2.6-7.5CM: CPT | Performed by: EMERGENCY MEDICINE

## 2024-09-08 PROCEDURE — 99285 EMERGENCY DEPT VISIT HI MDM: CPT | Mod: 25

## 2024-09-08 PROCEDURE — 72131 CT LUMBAR SPINE W/O DYE: CPT | Performed by: SURGERY

## 2024-09-08 PROCEDURE — 72125 CT NECK SPINE W/O DYE: CPT

## 2024-09-08 PROCEDURE — 72131 CT LUMBAR SPINE W/O DYE: CPT

## 2024-09-08 PROCEDURE — 2500000001 HC RX 250 WO HCPCS SELF ADMINISTERED DRUGS (ALT 637 FOR MEDICARE OP): Performed by: EMERGENCY MEDICINE

## 2024-09-08 PROCEDURE — 72125 CT NECK SPINE W/O DYE: CPT | Performed by: SURGERY

## 2024-09-08 ASSESSMENT — PAIN SCALES - GENERAL
PAINLEVEL_OUTOF10: 0 - NO PAIN

## 2024-09-08 ASSESSMENT — PAIN - FUNCTIONAL ASSESSMENT: PAIN_FUNCTIONAL_ASSESSMENT: 0-10

## 2024-09-08 NOTE — DISCHARGE INSTRUCTIONS
Protect the wound from dirt and injury.  It is okay to shower.  Keep the staple area covered with some Neosporin or other antibiotic ointment.    Do not drink alcohol until you have been reassessed from your head injury.  Avoid any activity in which another head injury is likely some examples are climbing, working at heights, cycling, sports, swimming or diving.    Stay with reliable adult for the next 24 hours.    Return to emergency department for dizziness, vomiting, confusion, fever, redness, swelling, drainage at the wound.

## 2024-09-08 NOTE — ED PROVIDER NOTES
HPI   Chief Complaint   Patient presents with    Fall     Fall with laceration to back of head         History provided by:  Medical records, patient and EMS personnel   used: No      This patient presents to the emergency department via ambulance for evaluation of head injury with laceration.  Patient states that he thinks is just a nothing kind of bump and he does not really need to be seen, but his son was insistent and called the ambulance.  Patient states he went out tonight and had some beers and fell and hit his head on something.  He says he does not really know what he hit his head on, but it was no big deal.  Patient states it really does not even hurt.  He says his left hip is sore but has been bothering for the past couple of days since he climbed a ladder to clean his gutters.  He states that he really should not have been on the ladder, because he had back surgery in June and is still supposed to be on some restrictions.  Patient says his low back is a little bit sore, no pain in other extremities.  He says he did not pass out.  He says his tetanus is up-to-date.  He is not on any blood thinners.      Patient History   Past Medical History:   Diagnosis Date    Allergic contact dermatitis 07/29/2016    Arthritis     Chronic pain disorder     GERD (gastroesophageal reflux disease)     Hyperlipidemia     Impotence 06/02/2015    Prostate cancer (Multi)     URI (upper respiratory infection) 06/27/2023     Past Surgical History:   Procedure Laterality Date    HERNIA REPAIR      unilateral inguinal hernia repair at around age 6 and around age 46    PROSTATE SURGERY  2021    Prostatectomy    SHOULDER SURGERY Right     Shoulder reconstruction     Family History   Problem Relation Name Age of Onset    Brain cancer Mother       Social History     Tobacco Use    Smoking status: Former     Current packs/day: 0.00     Types: Cigarettes, Cigars     Start date: 1978     Quit date: 2023     Years  since quittin.6     Passive exposure: Past    Smokeless tobacco: Never   Vaping Use    Vaping status: Never Used   Substance Use Topics    Alcohol use: Yes     Alcohol/week: 6.0 standard drinks of alcohol     Types: 6 Standard drinks or equivalent per week     Comment: 4-6 drinks weekly    Drug use: Never       Physical Exam   ED Triage Vitals [24 0208]   Temperature Heart Rate Respirations BP   36.3 °C (97.3 °F) 65 16 138/87      SpO2 Temp Source Heart Rate Source Patient Position   100 % Temporal -- --      BP Location FiO2 (%)     -- --       Physical Exam  Vitals reviewed.   Constitutional:       Appearance: Normal appearance.   HENT:      Head: Normocephalic.      Comments: Stellate laceration at vertex with surrounding abrasion and swelling.  No active bleeding.  No foreign body.  No cephalohematoma.  No raccoon's eyes.  No Avilez sign.     Right Ear: Tympanic membrane normal.      Left Ear: Tympanic membrane normal.      Ears:      Comments: No hemotympanum     Nose: Nose normal.   Eyes:      Extraocular Movements: Extraocular movements intact.      Conjunctiva/sclera: Conjunctivae normal.      Pupils: Pupils are equal, round, and reactive to light.   Cardiovascular:      Rate and Rhythm: Normal rate and regular rhythm.      Pulses: Normal pulses.   Pulmonary:      Effort: Pulmonary effort is normal.      Breath sounds: Normal breath sounds.   Abdominal:      General: Abdomen is flat. Bowel sounds are normal.      Palpations: Abdomen is soft.   Musculoskeletal:         General: Normal range of motion.      Cervical back: Normal range of motion and neck supple. No tenderness.      Comments: No midline cervical, thoracic, lumbar spine tenderness.  Left paralumbar tenderness and left greater trochanter tenderness.  No deformity.  No external rotation or shortening.   Skin:     General: Skin is warm and dry.      Capillary Refill: Capillary refill takes less than 2 seconds.      Comments: Stellate  laceration vertex of scalp approximately 3 cm in aggregate   Neurological:      General: No focal deficit present.      Mental Status: He is alert and oriented to person, place, and time.      Cranial Nerves: No cranial nerve deficit.      Sensory: No sensory deficit.      Motor: No weakness.   Psychiatric:         Mood and Affect: Mood normal.           ED Course & MDM   ED Course as of 09/08/24 0408   Sun Sep 08, 2024   0338 Patient reassessed [MN]      ED Course User Index  [MN] Gem Johnson MD         Diagnoses as of 09/08/24 0408   Fall, initial encounter   Head injury, initial encounter   Scalp laceration, initial encounter   Hip strain, left, initial encounter   Strain of lumbar region, initial encounter          XR hip left with pelvis when performed 2 or 3 views   Final Result   No evidence of acute osseous abnormality involving the pelvis/left   hip.             MACRO:   None        Signed by: Bhupendra Garcia 9/8/2024 3:15 AM   Dictation workstation:   [id number]      CT lumbar spine wo IV contrast   Final Result   No evidence of acute lumbar spine abnormality.        Interval discectomy, laminectomy and fusion at L4-L5. No abnormal   periprosthetic lucency. Unincorporated morselized bone graft material   is present about the posterior elements. There is an ill-defined   hypoattenuating region suggesting fluid collection in the laminectomy   bed measuring up to approximately 4.4 x 4.2 cm in transaxial   diameters, 3.0 cm in CC diameter. This may relate to seroma although   its sterility cannot be determined by imaging.        MACRO:   None        Signed by: Bhupendra Garcia 9/8/2024 3:14 AM   Dictation workstation:   [id number]      CT cervical spine wo IV contrast   Final Result   No evidence for an acute fracture or subluxation of the cervical   spine.        MACRO:   None        Signed by: Bhupendra Garcia 9/8/2024 3:02 AM   Dictation workstation:   [id number]      CT head wo IV contrast   Final Result   No  evidence of acute intracranial abnormality.        Posterior right paramedian scalp hematoma and laceration. No foreign   body is seen.        MACRO:   None        Signed by: Bhupendra Radha 9/8/2024 3:00 AM   Dictation workstation:   [id number]                   No data recorded     [place] Coma Scale Score: 15 (09/08/24 0227 : Melissa Sanchez RN)                     Diagnoses as of 09/08/24 0408   Fall, initial encounter   Head injury, initial encounter   Scalp laceration, initial encounter   Hip strain, left, initial encounter   Strain of lumbar region, initial encounter         Medical Decision Making  Patient presents emergency department the above history and physical.  Due to history of fall, visible injury and areas of pain imaging ordered and read by radiology.  CT scan of brain demonstrates the laceration without foreign body no skull fracture no intracranial injury.  Cervical spine shows degenerative changes without acute fracture.  Lumbar spine shows postoperative changes without acute injury.  X-rays left hip show no fracture or dislocation.    Wound was cleaned and repaired by me.  Please see procedure note.  Head injury precautions provided.  Wound care instructions provided.    Procedure  Laceration Repair    Performed by: Gem Johnson MD  Authorized by: Gem Johnson MD    Consent:     Consent obtained:  Verbal    Consent given by:  Patient    Risks discussed:  Infection, pain, poor cosmetic result and need for additional repair    Alternatives discussed:  Delayed treatment  Universal protocol:     Procedure explained and questions answered to patient or proxy's satisfaction: yes      Imaging studies available: yes      Patient identity confirmed:  Verbally with patient  Laceration details:     Location:  Scalp    Scalp location:  Crown    Length (cm):  3    Depth (mm):  3  Pre-procedure details:     Preparation:  Patient was prepped and draped in usual sterile fashion and imaging obtained to  evaluate for foreign bodies  Exploration:     Limited defect created (wound extended): no      Hemostasis achieved with:  Direct pressure    Imaging obtained comment:  CT    Imaging outcome: foreign body not noted      Wound exploration: wound explored through full range of motion      Wound extent: areolar tissue violated      Wound extent: no fascia violation noted, no foreign bodies/material noted, no nerve damage noted, no underlying fracture noted and no vascular damage noted      Contaminated: no    Treatment:     Area cleansed with:  Chlorhexidine and saline    Amount of cleaning:  Standard    Irrigation solution:  Sterile saline    Debridement:  None    Undermining:  None    Scar revision: no    Skin repair:     Repair method:  Staples    Number of staples:  6  Approximation:     Approximation:  Close  Repair type:     Repair type:  Simple  Post-procedure details:     Dressing:  Antibiotic ointment    Procedure completion:  Tolerated well, no immediate complications       Gem Johnson MD  09/08/24 1134

## 2024-09-10 ENCOUNTER — OFFICE VISIT (OUTPATIENT)
Dept: PRIMARY CARE | Facility: CLINIC | Age: 64
End: 2024-09-10
Payer: MEDICARE

## 2024-09-10 ENCOUNTER — APPOINTMENT (OUTPATIENT)
Dept: PHYSICAL THERAPY | Facility: HOSPITAL | Age: 64
End: 2024-09-10
Payer: MEDICARE

## 2024-09-10 VITALS
SYSTOLIC BLOOD PRESSURE: 146 MMHG | DIASTOLIC BLOOD PRESSURE: 80 MMHG | HEART RATE: 76 BPM | BODY MASS INDEX: 33.2 KG/M2 | WEIGHT: 212 LBS

## 2024-09-10 DIAGNOSIS — S09.90XD INJURY OF HEAD, SUBSEQUENT ENCOUNTER: ICD-10-CM

## 2024-09-10 DIAGNOSIS — F07.81 CONCUSSION SYNDROME: Primary | ICD-10-CM

## 2024-09-10 PROCEDURE — 99213 OFFICE O/P EST LOW 20 MIN: CPT

## 2024-09-10 PROCEDURE — 1124F ACP DISCUSS-NO DSCNMKR DOCD: CPT
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
## 2024-09-11 ENCOUNTER — HOSPITAL ENCOUNTER (OUTPATIENT)
Dept: RADIOLOGY | Facility: CLINIC | Age: 64
Discharge: HOME | End: 2024-09-11
Payer: MEDICARE

## 2024-09-11 ENCOUNTER — OFFICE VISIT (OUTPATIENT)
Dept: ORTHOPEDIC SURGERY | Facility: CLINIC | Age: 64
End: 2024-09-11
Payer: MEDICARE

## 2024-09-11 DIAGNOSIS — Z98.1 S/P LUMBAR SPINAL FUSION: Primary | ICD-10-CM

## 2024-09-11 DIAGNOSIS — M54.16 LUMBAR RADICULOPATHY: ICD-10-CM

## 2024-09-11 DIAGNOSIS — Z98.1 S/P LUMBAR SPINAL FUSION: ICD-10-CM

## 2024-09-11 PROCEDURE — 72120 X-RAY BEND ONLY L-S SPINE: CPT

## 2024-09-11 PROCEDURE — 99211 OFF/OP EST MAY X REQ PHY/QHP: CPT | Performed by: ORTHOPAEDIC SURGERY

## 2024-09-11 PROCEDURE — 72110 X-RAY EXAM L-2 SPINE 4/>VWS: CPT | Performed by: RADIOLOGY

## 2024-09-11 NOTE — PROGRESS NOTES
S: Alfa Cast is a 64 y.o. year old male patient who is about 11 weeks out status post L4-L5 posterior spine instrumented fusion laminectomy decompression and TLIF on June 25, 2024 for left lumbar radiculopathy.  He did well with surgery and had complete resolution of his left leg pain. Since I last saw him he did have a fall out of his care and sustained a head laceration. He was seen in the ED for this on 9/8. Otherwise his low back is doing well. He just start Physical therapy.       O:     General: Patient appears well-nourished and well-developed in no acute distress, Alert and Oriented x3  Psych: Pleasant mood and affect  HEENT: Extraocular muscles intact, pupils equal and round. Sclerae anicteric   Cardio: extremities warm and well perfused  Resp: unlabored symmetric breathing  Skin: Posterior lumbar incision well healed  Musculoskeletal/Neuro Exam: Normal gait.   Negative straight leg raise.       Lower extremity    Motor: Right leg with 5 out of 5 motor strength with hip flexion, knee extension, ankle dorsiflexion plantarflexion EHL against resistance  Left leg with 5 out of 5 motor strength with hip flexion, knee extension, ankle dorsiflexion plantarflexion EHL against resistance    Sensation to light touch intact along L2-S1 bilateraly              Imaging:      I reviewed x-rays of the lumbar spine obtained in clinic today.  AP lateral flexion and extension views.  Posterior instrumentation is intact.  Interbody cage intact with no signs of loosening or broken hardware. No dynamic instability        A/P: Alfa Cast is a 64 y.o. year old male patient who is 11 weeks out status post L4-L5 laminectomy decompression posterior spine instrumented fusion and TLIF on June 25, 2024 for left lumbar radiculopathy.  Continues to do well.  Radicular leg pain resolved.    X-rays look stable today. Incision is well healed. No hardware failure with fall. Continue outpatient PT. He has no restrictions. Follow up in  January    At the next visit he will need standing upright AP lateral flexion-extension views of the lumbar spine    After our discussion, the patient articulated understanding of the plan and felt that all questions had been answered satisfactorily. The patient was pleased with the visit and very appreciative for the care rendered.    **Please excuse any errors in grammar or translation related to this dictation. Voice recognition software was utilized to prepare this document. **                  Honey Gonzalez MD    Department of Orthopaedic Surgery  [facility]  [email or web address]

## 2024-09-13 ENCOUNTER — TREATMENT (OUTPATIENT)
Dept: PHYSICAL THERAPY | Facility: HOSPITAL | Age: 64
End: 2024-09-13
Payer: MEDICARE

## 2024-09-13 DIAGNOSIS — Z98.890 STATUS POST LUMBAR SURGERY: ICD-10-CM

## 2024-09-13 PROCEDURE — 97110 THERAPEUTIC EXERCISES: CPT | Mod: GP

## 2024-09-13 ASSESSMENT — PAIN SCALES - GENERAL: PAINLEVEL_OUTOF10: 0 - NO PAIN

## 2024-09-13 ASSESSMENT — PAIN - FUNCTIONAL ASSESSMENT: PAIN_FUNCTIONAL_ASSESSMENT: 0-10

## 2024-09-13 NOTE — PROGRESS NOTES
Physical Therapy    Physical Therapy Treatment    Patient Name: Alfa Cast  MRN: [id number]  Today's Date: 9/13/2024    Time Entry:   Time Calculation  Start Time: 0811  Stop Time: 0850  Time Calculation (min): 39 min     PT Therapeutic Procedures Time Entry  Therapeutic Exercise Time Entry: 39     Assessment:  PT Assessment  Assessment Comment: No fall related issues today. Progressing well.    Plan:  OP PT Plan  PT Plan: Skilled PT (3/4)    Current Problem  1. Status post lumbar surgery  Follow Up In Physical Therapy          General  PT  Visit  PT Received On: 09/13/24  General  General Comment: No back pain today. Had a fall with head lac. He will attempt to do his established ther ex program.    Subjective    Precautions  Precautions  Post-Surgical Precautions: Spinal precautions     Pain  Pain Assessment  Pain Assessment: 0-10  0-10 (Numeric) Pain Score: 0 - No pain    Objective   Activity Tolerance:  Activity Tolerance  Activity Tolerance Comments: Patient able to handle his post surgical ther ex without issue.    Treatments:  Therapeutic Exercise  Therapeutic Exercise Performed: Yes  Therapeutic Exercise Activity 1: Physiostep x 12 minutes.  Therapeutic Exercise Activity 2: [place] hip extension 3x10  Therapeutic Exercise Activity 3: [place] back extension 3x10  Therapeutic Exercise Activity 4: EOB hamstring stretch 3x30 sec  Therapeutic Exercise Activity 5: standing rectus stretch 3x30 sec  Therapeutic Exercise Activity 6: DB holds 10x10 sec  Therapeutic Exercise Activity 7: DB extensions 1x10    OP EDUCATION:       Goals:  Active       PT Problem       The patient will demonstrate increased core strength as evidenced by double leg lowering to 45  degrees with good lumbar stability  to improve core support in functional mobility tasks.        Start:  08/28/24    Expected End:  09/27/24            The patient will demonstrate full understanding and competent performance of their home exercise program to  maintain or potentially further improve their presenting condition.        Start:  08/28/24    Expected End:  09/27/24            The patient will record a decrease in self assessed disability on the Oswestry index of 10 ponts as evidence of meaningful decrease in the impact of pain on their ADL activities.        Start:  08/28/24    Expected End:  09/27/24

## 2024-09-18 ENCOUNTER — APPOINTMENT (OUTPATIENT)
Dept: PRIMARY CARE | Facility: CLINIC | Age: 64
End: 2024-09-18
Payer: MEDICARE

## 2024-09-20 ENCOUNTER — TREATMENT (OUTPATIENT)
Dept: PHYSICAL THERAPY | Facility: HOSPITAL | Age: 64
End: 2024-09-20
Payer: MEDICARE

## 2024-09-20 DIAGNOSIS — Z98.890 STATUS POST LUMBAR SURGERY: ICD-10-CM

## 2024-09-20 PROCEDURE — 97110 THERAPEUTIC EXERCISES: CPT | Mod: GP

## 2024-09-20 ASSESSMENT — PAIN SCALES - GENERAL: PAINLEVEL_OUTOF10: 0 - NO PAIN

## 2024-09-20 ASSESSMENT — PAIN - FUNCTIONAL ASSESSMENT: PAIN_FUNCTIONAL_ASSESSMENT: 0-10

## 2024-09-20 NOTE — PROGRESS NOTES
Physical Therapy    Physical Therapy Treatment    Patient Name: Alfa Cast  MRN: [id number]  Today's Date: 9/20/2024    Time Entry:   Time Calculation  Start Time: 0945  Stop Time: 1025  Time Calculation (min): 40 min     PT Therapeutic Procedures Time Entry  Therapeutic Exercise Time Entry: 40     Assessment:  PT Assessment  Assessment Comment: Doing well with post operative core program.    Plan:  OP PT Plan  PT Plan: Skilled PT (Continue core program /reassess progress.)    Current Problem  1. Status post lumbar surgery  Follow Up In Physical Therapy          General  PT  Visit  PT Received On: 09/20/24  General  General Comment: Patient with almost no back pain today. He is going to join [employer] later today to continue with his fitness activity post PT.    Subjective    Precautions  Precautions  Post-Surgical Precautions: Spinal precautions     Pain  Pain Assessment  Pain Assessment: 0-10  0-10 (Numeric) Pain Score: 0 - No pain    Objective   Activity Tolerance:  Activity Tolerance  Activity Tolerance Comments: Patient performing all ex with good concentration and technique. No pain during session today.    Treatments:  Therapeutic Exercise  Therapeutic Exercise Performed: Yes  Therapeutic Exercise Activity 1: Physiostep x 12 minutes.  Therapeutic Exercise Activity 2: [place] hip extension 3x10  Therapeutic Exercise Activity 3: [place] back extension 3x10  Therapeutic Exercise Activity 4: EOB hamstring stretch 3x30 sec  Therapeutic Exercise Activity 5: standing rectus stretch 3x30 sec  Therapeutic Exercise Activity 6: DB holds 10x10 sec  Therapeutic Exercise Activity 7: bird dog extensions x10    OP EDUCATION:  Discussed plan for easing into independent program at [facility].    Goals:  Active       PT Problem       The patient will demonstrate increased core strength as evidenced by double leg lowering to 45  degrees with good lumbar stability  to improve core support in functional mobility tasks.         Start:  08/28/24    Expected End:  09/27/24            The patient will demonstrate full understanding and competent performance of their home exercise program to maintain or potentially further improve their presenting condition.        Start:  08/28/24    Expected End:  09/27/24            The patient will record a decrease in self assessed disability on the Oswestry index of 10 ponts as evidence of meaningful decrease in the impact of pain on their ADL activities.        Start:  08/28/24    Expected End:  09/27/24

## 2024-10-10 ENCOUNTER — LAB (OUTPATIENT)
Dept: LAB | Facility: LAB | Age: 64
End: 2024-10-10
Payer: MEDICARE

## 2024-10-10 DIAGNOSIS — Z85.46 PERSONAL HISTORY OF MALIGNANT NEOPLASM OF PROSTATE: Primary | ICD-10-CM

## 2024-10-11 LAB — PSA SERPL-MCNC: <0.1 NG/ML

## 2024-12-23 DIAGNOSIS — I25.10 CORONARY ARTERIOSCLEROSIS: Primary | ICD-10-CM

## 2024-12-24 RX ORDER — ATORVASTATIN CALCIUM 80 MG/1
80 TABLET, FILM COATED ORAL DAILY
Qty: 90 TABLET | Refills: 1 | Status: SHIPPED | OUTPATIENT
Start: 2024-12-24

## 2025-01-15 ENCOUNTER — OFFICE VISIT (OUTPATIENT)
Dept: ORTHOPEDIC SURGERY | Facility: CLINIC | Age: 65
End: 2025-01-15
Payer: MEDICARE

## 2025-01-15 ENCOUNTER — HOSPITAL ENCOUNTER (OUTPATIENT)
Dept: RADIOLOGY | Facility: CLINIC | Age: 65
Discharge: HOME | End: 2025-01-15
Payer: MEDICARE

## 2025-01-15 VITALS — HEIGHT: 67 IN | BODY MASS INDEX: 33.27 KG/M2 | WEIGHT: 212 LBS

## 2025-01-15 DIAGNOSIS — Z98.1 S/P LUMBAR SPINAL FUSION: Primary | ICD-10-CM

## 2025-01-15 DIAGNOSIS — M54.16 LUMBAR RADICULOPATHY, CHRONIC: ICD-10-CM

## 2025-01-15 DIAGNOSIS — Z98.1 S/P LUMBAR SPINAL FUSION: ICD-10-CM

## 2025-01-15 PROCEDURE — 99213 OFFICE O/P EST LOW 20 MIN: CPT | Performed by: ORTHOPAEDIC SURGERY

## 2025-01-15 PROCEDURE — 3008F BODY MASS INDEX DOCD: CPT | Performed by: ORTHOPAEDIC SURGERY

## 2025-01-15 PROCEDURE — 72110 X-RAY EXAM L-2 SPINE 4/>VWS: CPT | Performed by: RADIOLOGY

## 2025-01-15 PROCEDURE — 1125F AMNT PAIN NOTED PAIN PRSNT: CPT | Performed by: ORTHOPAEDIC SURGERY

## 2025-01-15 PROCEDURE — 1159F MED LIST DOCD IN RCRD: CPT | Performed by: ORTHOPAEDIC SURGERY

## 2025-01-15 PROCEDURE — 1036F TOBACCO NON-USER: CPT | Performed by: ORTHOPAEDIC SURGERY

## 2025-01-15 PROCEDURE — 72120 X-RAY BEND ONLY L-S SPINE: CPT

## 2025-01-15 ASSESSMENT — ENCOUNTER SYMPTOMS
OCCASIONAL FEELINGS OF UNSTEADINESS: 0
LOSS OF SENSATION IN FEET: 0
DEPRESSION: 1

## 2025-01-15 ASSESSMENT — PAIN SCALES - GENERAL: PAINLEVEL_OUTOF10: 2

## 2025-01-15 ASSESSMENT — PAIN DESCRIPTION - DESCRIPTORS: DESCRIPTORS: ACHING

## 2025-01-15 ASSESSMENT — PAIN - FUNCTIONAL ASSESSMENT: PAIN_FUNCTIONAL_ASSESSMENT: 0-10

## 2025-01-15 NOTE — PROGRESS NOTES
S: Alfa Cast is a 64 y.o. year old male patient who is a little more than 6 months out status post L4-L5 posterior spine instrumented fusion laminectomy decompression and TLIF on June 25, 2024 for left lumbar radiculopathy.  He continues to do well and had complete resolution of his left leg pain. He completed PT. he still have some stiffness in his lower back.  Especially with increased activity.  He is not consistent with his home exercises.  He states that some of the stretches exacerbate his urinary incontinence which he had since his prostate cancer diagnosis.      O:     General: Patient appears well-nourished and well-developed in no acute distress, Alert and Oriented x3  Psych: Pleasant mood and affect  HEENT: Extraocular muscles intact, pupils equal and round. Sclerae anicteric   Cardio: extremities warm and well perfused  Resp: unlabored symmetric breathing  Skin: Posterior lumbar incision well healed  Musculoskeletal/Neuro Exam: Normal gait.   Negative straight leg raise.       Lower extremity    Motor: Right leg with 5 out of 5 motor strength with hip flexion, knee extension, ankle dorsiflexion plantarflexion EHL against resistance  Left leg with 5 out of 5 motor strength with hip flexion, knee extension, ankle dorsiflexion plantarflexion EHL against resistance    Sensation to light touch intact along L2-S1 bilateraly              Imaging:      I reviewed x-rays of the lumbar spine obtained in clinic today.  AP lateral flexion and extension views.  Posterior instrumentation is intact.  Interbody cage intact with no signs of loosening or broken hardware. No dynamic instability        A/P: Alfa Cast is a 64 y.o. year old male patient who a little more than 6 months out status post L4-L5 laminectomy decompression posterior spine instrumented fusion and TLIF on June 25, 2024 for left lumbar radiculopathy.  He is doing well.  Radicular leg pain resolved.  A lot of stiffness in his lower back.  Encouraged  him to try to continue his exercises.  Otherwise no restrictions in activities.    X-rays look stable today without evidence of hardware failure.  Follow-up in 6 months for his 1 year follow-up    At the next visit he will need standing upright AP lateral flexion-extension views of the lumbar spine    After our discussion, the patient articulated understanding of the plan and felt that all questions had been answered satisfactorily. The patient was pleased with the visit and very appreciative for the care rendered.    **Please excuse any errors in grammar or translation related to this dictation. Voice recognition software was utilized to prepare this document. **                  Honey Gonzalez MD    Department of Orthopaedic Surgery  [facility]  [email or web address]

## 2025-02-04 NOTE — PROGRESS NOTES
"Subjective   Patient ID: Alfa Cast is a 64 y.o. male who presents for Follow-up (6 months).    Prostate cancer: He is going to [facility] for pelvic floor PT. s/p robotic prostatectomy with Dr. Nel Hawthorne in March 2021. He has a followup with the surgeon's team at [facility] next month. He is on the cialis. He went to pelvic floor PT x2 for 4 or 5 months apiece and he only had slight temporary improvement. He states that his issue is that there is no sensation beforehand to warn him that he needs to urinate. Following with urology every 6 months.      Back pain: Doing reasonably well since surgery June 2024. Had followup with ortho spine. Some trouble with stiffness. Recommended stretching.      GERD: Controlled on omeprazole.      Tinnitus: He has a history of tinnitus, not using hearing aids. [facility]      Atopic dermatitis: Met with derm at [place]. They noticed the tattoo changes. He is having some irritation from incontinence equipment. Now using triamcinolone which works well. Once or twice a day. Ointment for the cracking and lotion for small amount of irritation.      Had a low dose lung cancer screening CT done July 2023. There are multiple nodules 4mm that are considered to be benign, will check yearly.   It was also noted that he has some calcifications around coronary arteries. No recent stress test on file.     PTSD: [facility]. Not on meds.     Hyperlipidemia: On atorvastatin, no SE's.    [facility] for [facility] during the  (new primary Starla Carlos A [phone] and  from [place]).  He has a special needs son who came out here from [place] to get access to a school in the area.     All other systems have been reviewed and are negative for complaint     Objective   /78 (BP Location: Left arm, Patient Position: Sitting, BP Cuff Size: Adult)   Pulse 76   Ht 1.702 m (5' 7\")   " Wt 97.1 kg (214 lb)   BMI 33.52 kg/m²     Gen: No acute distress, alert and oriented x3, pleasant   HEENT: moist mucous membranes, b/l external auditory canals are clear of debris, TMs within normal limits, no oropharyngeal lesions, eomi, perrla   Neck: thyroid within normal limits, no lymphadenopathy   CV: RRR, normal S1/S2, no murmur   Resp: Clear to auscultation bilaterally, no wheezes or rhonchi appreciated  Abd: soft, nontender, non-distended, no guarding/rigidity, bowel sounds present  Extr: no edema, no calf tenderness  Derm: Skin is warm and dry, no rashes appreciated  Psych: mood is good, affect is congruent, good hygiene, normal speech and eye contact  Neuro: cranial nerves grossly intact, normal gait    Assessment/Plan   #Atopic dermatitis  Doing well with topical steroids  Following with derm  Desonide sparingly and triamcinolone regularly  He has claritin QAM and hydroxyzine QPM    #Prostate cancer  #Urinary incontinence  #ED  s/p radical prostatectomy  he has been to pelvic floor PT without much improvement  Monitoring PSA with [facility]   He is on cialis prn and it working well     #Tinnitus  discussed supportive care  He is going to see ENT next month      #Back pain   S/p surgery, doing reasonably well     #Cigarette smoker  #COPD  Doing well off albuterol  Would recheck at his follow up in July 2024     #CAD  #HLD  On atorvastatin     #GERD  Controlled on otc omeprazole  Trial higher dose omeprazole  Work on weight loss    HCM:  UTD for COVID, PPSV23, and flu shots  C-scope 2025, awaiting path results and follow up recs

## 2025-02-10 ENCOUNTER — APPOINTMENT (OUTPATIENT)
Dept: PRIMARY CARE | Facility: CLINIC | Age: 65
End: 2025-02-10
Payer: MEDICARE

## 2025-02-10 VITALS
WEIGHT: 214 LBS | HEIGHT: 67 IN | HEART RATE: 76 BPM | DIASTOLIC BLOOD PRESSURE: 78 MMHG | BODY MASS INDEX: 33.59 KG/M2 | SYSTOLIC BLOOD PRESSURE: 151 MMHG

## 2025-02-10 DIAGNOSIS — I25.83 CORONARY ARTERY DISEASE DUE TO LIPID RICH PLAQUE: ICD-10-CM

## 2025-02-10 DIAGNOSIS — Z13.220 SCREENING FOR HYPERLIPIDEMIA: ICD-10-CM

## 2025-02-10 DIAGNOSIS — Z00.00 HEALTHCARE MAINTENANCE: Primary | ICD-10-CM

## 2025-02-10 DIAGNOSIS — L20.9 ATOPIC DERMATITIS, UNSPECIFIED TYPE: ICD-10-CM

## 2025-02-10 DIAGNOSIS — I25.10 CORONARY ARTERY DISEASE DUE TO LIPID RICH PLAQUE: ICD-10-CM

## 2025-02-10 DIAGNOSIS — K21.9 GASTROESOPHAGEAL REFLUX DISEASE, UNSPECIFIED WHETHER ESOPHAGITIS PRESENT: ICD-10-CM

## 2025-02-10 PROCEDURE — 1036F TOBACCO NON-USER: CPT | Performed by: FAMILY MEDICINE

## 2025-02-10 PROCEDURE — 99214 OFFICE O/P EST MOD 30 MIN: CPT | Performed by: FAMILY MEDICINE

## 2025-02-10 PROCEDURE — 1159F MED LIST DOCD IN RCRD: CPT | Performed by: FAMILY MEDICINE

## 2025-02-10 PROCEDURE — 1124F ACP DISCUSS-NO DSCNMKR DOCD: CPT | Performed by: FAMILY MEDICINE

## 2025-02-10 PROCEDURE — 3008F BODY MASS INDEX DOCD: CPT | Performed by: FAMILY MEDICINE

## 2025-02-10 RX ORDER — PREDNISONE 20 MG/1
TABLET ORAL
Qty: 12 TABLET | Refills: 0 | Status: SHIPPED | OUTPATIENT
Start: 2025-02-10

## 2025-02-10 RX ORDER — OMEPRAZOLE 40 MG/1
40 CAPSULE, DELAYED RELEASE ORAL
Qty: 30 CAPSULE | Refills: 2 | Status: SHIPPED | OUTPATIENT
Start: 2025-02-10 | End: 2025-05-11

## 2025-02-10 NOTE — PATIENT INSTRUCTIONS
Schedule an appointment for labs at [email or web address] or call [phone] 24 hours a day, 7 days a week.   You can also download the [email or web address] lab scheduling lavell on your phone.

## 2025-03-04 LAB
ALBUMIN SERPL-MCNC: 4.1 G/DL (ref 3.6–5.1)
ALP SERPL-CCNC: 106 U/L (ref 35–144)
ALT SERPL-CCNC: 21 U/L (ref 9–46)
ANION GAP SERPL CALCULATED.4IONS-SCNC: 9 MMOL/L (CALC) (ref 7–17)
AST SERPL-CCNC: 26 U/L (ref 10–35)
BASOPHILS # BLD AUTO: 83 CELLS/UL (ref 0–200)
BASOPHILS NFR BLD AUTO: 1.5 %
BILIRUB SERPL-MCNC: 0.8 MG/DL (ref 0.2–1.2)
BUN SERPL-MCNC: 19 MG/DL (ref 7–25)
CALCIUM SERPL-MCNC: 9 MG/DL (ref 8.6–10.3)
CHLORIDE SERPL-SCNC: 105 MMOL/L (ref 98–110)
CHOLEST SERPL-MCNC: 127 MG/DL
CHOLEST/HDLC SERPL: 1.7 (CALC)
CO2 SERPL-SCNC: 27 MMOL/L (ref 20–32)
CREAT SERPL-MCNC: 0.73 MG/DL (ref 0.7–1.35)
EGFRCR SERPLBLD CKD-EPI 2021: 100 ML/MIN/1.73M2
EOSINOPHIL # BLD AUTO: 281 CELLS/UL (ref 15–500)
EOSINOPHIL NFR BLD AUTO: 5.1 %
ERYTHROCYTE [DISTWIDTH] IN BLOOD BY AUTOMATED COUNT: 14.5 % (ref 11–15)
GLUCOSE SERPL-MCNC: 89 MG/DL (ref 65–99)
HCT VFR BLD AUTO: 35.2 % (ref 38.5–50)
HDLC SERPL-MCNC: 76 MG/DL
HGB BLD-MCNC: 11.4 G/DL (ref 13.2–17.1)
LDLC SERPL CALC-MCNC: 38 MG/DL (CALC)
LYMPHOCYTES # BLD AUTO: 1029 CELLS/UL (ref 850–3900)
LYMPHOCYTES NFR BLD AUTO: 18.7 %
MCH RBC QN AUTO: 28.3 PG (ref 27–33)
MCHC RBC AUTO-ENTMCNC: 32.4 G/DL (ref 32–36)
MCV RBC AUTO: 87.3 FL (ref 80–100)
MONOCYTES # BLD AUTO: 644 CELLS/UL (ref 200–950)
MONOCYTES NFR BLD AUTO: 11.7 %
NEUTROPHILS # BLD AUTO: 3465 CELLS/UL (ref 1500–7800)
NEUTROPHILS NFR BLD AUTO: 63 %
NONHDLC SERPL-MCNC: 51 MG/DL (CALC)
PLATELET # BLD AUTO: 209 THOUSAND/UL (ref 140–400)
PMV BLD REES-ECKER: 10.7 FL (ref 7.5–12.5)
POTASSIUM SERPL-SCNC: 3.7 MMOL/L (ref 3.5–5.3)
PROT SERPL-MCNC: 6.8 G/DL (ref 6.1–8.1)
RBC # BLD AUTO: 4.03 MILLION/UL (ref 4.2–5.8)
SODIUM SERPL-SCNC: 141 MMOL/L (ref 135–146)
TRIGL SERPL-MCNC: 52 MG/DL
WBC # BLD AUTO: 5.5 THOUSAND/UL (ref 3.8–10.8)

## 2025-03-31 DIAGNOSIS — I25.10 CORONARY ARTERIOSCLEROSIS: ICD-10-CM

## 2025-03-31 RX ORDER — ATORVASTATIN CALCIUM 80 MG/1
80 TABLET, FILM COATED ORAL DAILY
Qty: 90 TABLET | Refills: 3 | Status: SHIPPED | OUTPATIENT
Start: 2025-03-31

## 2025-05-01 DIAGNOSIS — K21.9 GASTROESOPHAGEAL REFLUX DISEASE, UNSPECIFIED WHETHER ESOPHAGITIS PRESENT: ICD-10-CM

## 2025-05-01 RX ORDER — OMEPRAZOLE 40 MG/1
40 CAPSULE, DELAYED RELEASE ORAL EVERY MORNING
Qty: 90 CAPSULE | Refills: 1 | Status: SHIPPED | OUTPATIENT
Start: 2025-05-01

## 2025-07-15 ENCOUNTER — OFFICE VISIT (OUTPATIENT)
Dept: ORTHOPEDIC SURGERY | Facility: CLINIC | Age: 65
End: 2025-07-15
Payer: MEDICARE

## 2025-07-15 ENCOUNTER — HOSPITAL ENCOUNTER (OUTPATIENT)
Dept: RADIOLOGY | Facility: CLINIC | Age: 65
Discharge: HOME | End: 2025-07-15
Payer: MEDICARE

## 2025-07-15 VITALS — HEIGHT: 67 IN | WEIGHT: 214 LBS | BODY MASS INDEX: 33.59 KG/M2

## 2025-07-15 DIAGNOSIS — S32.030A CLOSED COMPRESSION FRACTURE OF L3 LUMBAR VERTEBRA, INITIAL ENCOUNTER (MULTI): ICD-10-CM

## 2025-07-15 DIAGNOSIS — Z98.1 S/P LUMBAR SPINAL FUSION: Primary | ICD-10-CM

## 2025-07-15 DIAGNOSIS — M48.061 SPINAL STENOSIS OF LUMBAR REGION WITH RADICULOPATHY: ICD-10-CM

## 2025-07-15 DIAGNOSIS — M54.16 SPINAL STENOSIS OF LUMBAR REGION WITH RADICULOPATHY: ICD-10-CM

## 2025-07-15 PROCEDURE — 72120 X-RAY BEND ONLY L-S SPINE: CPT

## 2025-07-15 PROCEDURE — 72110 X-RAY EXAM L-2 SPINE 4/>VWS: CPT | Performed by: RADIOLOGY

## 2025-07-15 PROCEDURE — 1159F MED LIST DOCD IN RCRD: CPT | Performed by: ORTHOPAEDIC SURGERY

## 2025-07-15 PROCEDURE — 3008F BODY MASS INDEX DOCD: CPT | Performed by: ORTHOPAEDIC SURGERY

## 2025-07-15 PROCEDURE — 99213 OFFICE O/P EST LOW 20 MIN: CPT | Performed by: ORTHOPAEDIC SURGERY

## 2025-07-15 PROCEDURE — 1036F TOBACCO NON-USER: CPT | Performed by: ORTHOPAEDIC SURGERY

## 2025-07-15 PROCEDURE — 99212 OFFICE O/P EST SF 10 MIN: CPT | Performed by: ORTHOPAEDIC SURGERY

## 2025-07-15 ASSESSMENT — PAIN SCALES - GENERAL
PAINLEVEL_OUTOF10: 0 - NO PAIN
PAINLEVEL_OUTOF10: 0-NO PAIN

## 2025-07-15 ASSESSMENT — ENCOUNTER SYMPTOMS
LOSS OF SENSATION IN FEET: 0
OCCASIONAL FEELINGS OF UNSTEADINESS: 0
DEPRESSION: 0

## 2025-07-15 ASSESSMENT — PAIN - FUNCTIONAL ASSESSMENT: PAIN_FUNCTIONAL_ASSESSMENT: NO/DENIES PAIN

## 2025-07-15 ASSESSMENT — COLUMBIA-SUICIDE SEVERITY RATING SCALE - C-SSRS
1. IN THE PAST MONTH, HAVE YOU WISHED YOU WERE DEAD OR WISHED YOU COULD GO TO SLEEP AND NOT WAKE UP?: NO
6. HAVE YOU EVER DONE ANYTHING, STARTED TO DO ANYTHING, OR PREPARED TO DO ANYTHING TO END YOUR LIFE?: NO
2. HAVE YOU ACTUALLY HAD ANY THOUGHTS OF KILLING YOURSELF?: NO

## 2025-07-15 NOTE — PROGRESS NOTES
S: Alfa Cast is a 65 y.o. year old male patient who is 1 year out status post L4-L5 posterior spine instrumented fusion laminectomy decompression and TLIF on June 25, 2024 for left lumbar radiculopathy.  He continues to do well and had complete resolution of his left leg pain. No back pain. Occasionally still have some stiffness in his lower back.  Especially with increased activity.  He is back to his normal activities. He rode his motorcycle for 50 miles.       O:     General: Patient appears well-nourished and well-developed in no acute distress, Alert and Oriented x3  Psych: Pleasant mood and affect  HEENT: Extraocular muscles intact, pupils equal and round. Sclerae anicteric   Cardio: extremities warm and well perfused  Resp: unlabored symmetric breathing  Skin: Posterior lumbar incision well healed  Musculoskeletal/Neuro Exam: Normal gait.   Negative straight leg raise.       Lower extremity    Motor: Right leg with 5 out of 5 motor strength with hip flexion, knee extension, ankle dorsiflexion plantarflexion EHL against resistance  Left leg with 5 out of 5 motor strength with hip flexion, knee extension, ankle dorsiflexion plantarflexion EHL against resistance    Sensation to light touch intact along L2-S1 bilateraly              Imaging:      I reviewed x-rays of the lumbar spine obtained in clinic today.  AP lateral flexion and extension views.  Posterior instrumentation is intact.  Interbody cage intact with no signs of loosening or broken hardware. No dynamic instability. Mild retrolisthesis L2-3 and L3-4        A/P: Alfa Cast is a 65 y.o. year old male patient who is 1 year out status post L4-L5 laminectomy decompression posterior spine instrumented fusion and TLIF on June 25, 2024 for left lumbar radiculopathy. He continues to do well. Back to normal activities. Occasionally some stiffness with activities.  Encouraged him to try to continue his exercises.    X-rays look stable today without evidence  of hardware failure.  Follow-up in one year    At the next visit he will need standing upright AP lateral flexion-extension views of the lumbar spine    After our discussion, the patient articulated understanding of the plan and felt that all questions had been answered satisfactorily. The patient was pleased with the visit and very appreciative for the care rendered.    **Please excuse any errors in grammar or translation related to this dictation. Voice recognition software was utilized to prepare this document. **                  Honey Gonzalez MD    Department of Orthopaedic Surgery  [facility]  [email or web address]

## 2025-07-16 ENCOUNTER — APPOINTMENT (OUTPATIENT)
Dept: ORTHOPEDIC SURGERY | Facility: CLINIC | Age: 65
End: 2025-07-16
Payer: MEDICARE

## 2025-08-11 ENCOUNTER — APPOINTMENT (OUTPATIENT)
Dept: PRIMARY CARE | Facility: CLINIC | Age: 65
End: 2025-08-11
Payer: MEDICARE

## 2025-08-22 DIAGNOSIS — Z87.891 PERSONAL HISTORY OF NICOTINE DEPENDENCE: Primary | ICD-10-CM

## (undated) DEVICE — SEALANT, HEMOSTATIC, FLOSEAL, 10 ML

## (undated) DEVICE — KIT, PATIENT CARE, JACKSON TABLE W/PRONE-SAFE HEADREST

## (undated) DEVICE — SPONGE, GAUZE, XRAY DECT, 16 PLY, 4 X 4, W/MASTER DMT,STERILE

## (undated) DEVICE — DRESSING, NON-ADHERENT, OIL EMULSION, CURITY, 3 X 8 IN, STERILE

## (undated) DEVICE — MANIFOLD, 4 PORT NEPTUNE STANDARD

## (undated) DEVICE — COVER, CART, 45 X 27 X 48 IN, CLEAR

## (undated) DEVICE — DRESSING, GAUZE, WASHED FLUFF, LARGE, STERILE

## (undated) DEVICE — DRAPE, INSTRUMENT, W/POUCH, STERI DRAPE, 7 X 11 IN, DISPOSABLE, STERILE

## (undated) DEVICE — CLIPPER, SURGICAL BLADE ASSEMBLY, GENERAL PURPOSE, SINGLE USE

## (undated) DEVICE — EVACUATOR, WOUND, CLOSED, 3 SPRING, 400 CC, Y CONNECTING TUBE

## (undated) DEVICE — CATHETER TRAY, SURESTEP, 16FR, URINE METER W/STATLOCK

## (undated) DEVICE — TUBING, SUCTION, CONNECTING, STERILE 0.25 X 120 IN., LF

## (undated) DEVICE — Device

## (undated) DEVICE — SUTURE, ETHILON, 3-0, 30 IN, FS-1, BLACK

## (undated) DEVICE — CAUTERY, PENCIL, PUSH BUTTON, SMOKE EVAC, 70MM

## (undated) DEVICE — SPHERE, STEALTHSTATION, 5-PK

## (undated) DEVICE — LEGEND, LUB DIFFUSER PACK

## (undated) DEVICE — SUTURE, VICRYL, 0, 18 IN, CT-1, UNDYED

## (undated) DEVICE — DRAPE, SHEET, FAN FOLDED, HALF, 44 X 58 IN, DISPOSABLE, LF, STERILE

## (undated) DEVICE — SEALER, BIPOLAR, AQUA MANTYS 6.0

## (undated) DEVICE — SUTURE, VICRYL, 2-0, 18 IN CP-2, UNDYED

## (undated) DEVICE — COVER, TABLE, 44 X 75 IN, DISPOSABLE, LF, STERILE

## (undated) DEVICE — SPONGE, HEMOSTATIC, GELATIN, SURGIFOAM, 8 X 12.5 CM X 10 MM

## (undated) DEVICE — DRESSING, ISLAND, ADHESIVE, TELFA, 4 X 8 IN

## (undated) DEVICE — COVER, TABLE, UHC

## (undated) DEVICE — BONE, MILL, MIDAS REX, DUAL BLADE, ELECTRIC

## (undated) DEVICE — ADHESIVE, SKIN, LIQUIBAND EXCEED

## (undated) DEVICE — DRESSING, ADHESIVE, ISLAND, TELFA, 2 X 3.75 IN, LF

## (undated) DEVICE — ELECTRODE, ELECTROSURGICAL, BLADE, INSULATED, ENT/IMA, STERILE

## (undated) DEVICE — CATHETER, IV, ANGIOCATH, 14 G X 1.88 IN, FEP POLYMER

## (undated) DEVICE — DRAIN, WOUND, ROUND, W/TROCAR, HOLE PATTERN, 10 IN, MEDIUM/LARGE, 3/16 X 49 IN